# Patient Record
Sex: FEMALE | Race: WHITE | Employment: OTHER | URBAN - METROPOLITAN AREA
[De-identification: names, ages, dates, MRNs, and addresses within clinical notes are randomized per-mention and may not be internally consistent; named-entity substitution may affect disease eponyms.]

---

## 2018-01-17 ENCOUNTER — TRANSFERRED RECORDS (OUTPATIENT)
Dept: HEALTH INFORMATION MANAGEMENT | Facility: CLINIC | Age: 83
End: 2018-01-17

## 2018-03-15 ENCOUNTER — HOSPITAL ENCOUNTER (OUTPATIENT)
Facility: CLINIC | Age: 83
Setting detail: OBSERVATION
Discharge: HOME | End: 2018-03-16
Attending: EMERGENCY MEDICINE | Admitting: INTERNAL MEDICINE
Payer: MEDICARE

## 2018-03-15 DIAGNOSIS — I48.19 PERSISTENT ATRIAL FIBRILLATION (H): Primary | ICD-10-CM

## 2018-03-15 DIAGNOSIS — K92.2 GASTROINTESTINAL HEMORRHAGE, UNSPECIFIED GASTROINTESTINAL HEMORRHAGE TYPE: ICD-10-CM

## 2018-03-15 PROBLEM — R19.7 DIARRHEA: Status: ACTIVE | Noted: 2018-03-15

## 2018-03-15 LAB
BASOPHILS # BLD AUTO: 0 10E9/L (ref 0–0.2)
BASOPHILS NFR BLD AUTO: 0 %
C COLI+JEJUNI+LARI FUSA STL QL NAA+PROBE: ABNORMAL
DIFFERENTIAL METHOD BLD: ABNORMAL
EC STX1 GENE STL QL NAA+PROBE: ABNORMAL
EC STX2 GENE STL QL NAA+PROBE: ABNORMAL
ENTERIC PATHOGEN COMMENT: ABNORMAL
EOSINOPHIL # BLD AUTO: 0 10E9/L (ref 0–0.7)
EOSINOPHIL NFR BLD AUTO: 0 %
ERYTHROCYTE [DISTWIDTH] IN BLOOD BY AUTOMATED COUNT: 14.8 % (ref 10–15)
HCT VFR BLD AUTO: 33.2 % (ref 35–47)
HGB BLD-MCNC: 10.8 G/DL (ref 11.7–15.7)
IMM GRANULOCYTES # BLD: 0 10E9/L (ref 0–0.4)
IMM GRANULOCYTES NFR BLD: 0.7 %
INR PPP: 2.57 (ref 0.86–1.14)
LACTATE BLD-SCNC: 1.5 MMOL/L (ref 0.7–2)
LYMPHOCYTES # BLD AUTO: 0.9 10E9/L (ref 0.8–5.3)
LYMPHOCYTES NFR BLD AUTO: 31.3 %
MCH RBC QN AUTO: 36.6 PG (ref 26.5–33)
MCHC RBC AUTO-ENTMCNC: 32.5 G/DL (ref 31.5–36.5)
MCV RBC AUTO: 113 FL (ref 78–100)
MONOCYTES # BLD AUTO: 0.5 10E9/L (ref 0–1.3)
MONOCYTES NFR BLD AUTO: 16.4 %
NEUTROPHILS # BLD AUTO: 1.4 10E9/L (ref 1.6–8.3)
NEUTROPHILS NFR BLD AUTO: 50.6 %
NOROV GI+II ORF1-ORF2 JNC STL QL NAA+PR: ABNORMAL
NRBC # BLD AUTO: 0 10*3/UL
NRBC BLD AUTO-RTO: 1 /100
PLATELET # BLD AUTO: 80 10E9/L (ref 150–450)
RBC # BLD AUTO: 2.95 10E12/L (ref 3.8–5.2)
RVA NSP5 STL QL NAA+PROBE: ABNORMAL
SALMONELLA SP RPOD STL QL NAA+PROBE: ABNORMAL
SHIGELLA SP+EIEC IPAH STL QL NAA+PROBE: ABNORMAL
V CHOL+PARA RFBL+TRKH+TNAA STL QL NAA+PR: ABNORMAL
WBC # BLD AUTO: 2.8 10E9/L (ref 4–11)
Y ENTERO RECN STL QL NAA+PROBE: ABNORMAL

## 2018-03-15 PROCEDURE — G0378 HOSPITAL OBSERVATION PER HR: HCPCS

## 2018-03-15 PROCEDURE — 83605 ASSAY OF LACTIC ACID: CPT | Performed by: EMERGENCY MEDICINE

## 2018-03-15 PROCEDURE — 99207 ZZC CDG-MDM COMPONENT: MEETS LOW - DOWN CODED: CPT | Performed by: PHYSICIAN ASSISTANT

## 2018-03-15 PROCEDURE — 87506 IADNA-DNA/RNA PROBE TQ 6-11: CPT | Performed by: EMERGENCY MEDICINE

## 2018-03-15 PROCEDURE — 99285 EMERGENCY DEPT VISIT HI MDM: CPT | Mod: 25

## 2018-03-15 PROCEDURE — 85025 COMPLETE CBC W/AUTO DIFF WBC: CPT | Performed by: EMERGENCY MEDICINE

## 2018-03-15 PROCEDURE — 99219 ZZC INITIAL OBSERVATION CARE,LEVL II: CPT | Performed by: PHYSICIAN ASSISTANT

## 2018-03-15 PROCEDURE — 85610 PROTHROMBIN TIME: CPT | Performed by: EMERGENCY MEDICINE

## 2018-03-15 RX ORDER — ONDANSETRON 4 MG/1
4 TABLET, ORALLY DISINTEGRATING ORAL EVERY 6 HOURS PRN
Status: DISCONTINUED | OUTPATIENT
Start: 2018-03-15 | End: 2018-03-16 | Stop reason: HOSPADM

## 2018-03-15 RX ORDER — ACETAMINOPHEN 650 MG/1
650 SUPPOSITORY RECTAL EVERY 4 HOURS PRN
Status: DISCONTINUED | OUTPATIENT
Start: 2018-03-15 | End: 2018-03-16 | Stop reason: HOSPADM

## 2018-03-15 RX ORDER — NALOXONE HYDROCHLORIDE 0.4 MG/ML
.1-.4 INJECTION, SOLUTION INTRAMUSCULAR; INTRAVENOUS; SUBCUTANEOUS
Status: DISCONTINUED | OUTPATIENT
Start: 2018-03-15 | End: 2018-03-16 | Stop reason: HOSPADM

## 2018-03-15 RX ORDER — ONDANSETRON 2 MG/ML
4 INJECTION INTRAMUSCULAR; INTRAVENOUS EVERY 6 HOURS PRN
Status: DISCONTINUED | OUTPATIENT
Start: 2018-03-15 | End: 2018-03-16 | Stop reason: HOSPADM

## 2018-03-15 RX ORDER — ACETAMINOPHEN 325 MG/1
650 TABLET ORAL EVERY 4 HOURS PRN
Status: DISCONTINUED | OUTPATIENT
Start: 2018-03-15 | End: 2018-03-16 | Stop reason: HOSPADM

## 2018-03-15 ASSESSMENT — ENCOUNTER SYMPTOMS
BLOOD IN STOOL: 1
VOMITING: 0
NAUSEA: 0
ABDOMINAL PAIN: 1
DIARRHEA: 1

## 2018-03-15 NOTE — ED NOTES
"..  [facility]  ED Nurse Handoff Report    Kayley Putnam is a 84 year old female   ED Chief complaint: Diarrhea and Rectal Bleeding  . ED Diagnosis:   Final diagnoses:   Gastrointestinal hemorrhage, unspecified gastrointestinal hemorrhage type     Allergies:   Allergies   Allergen Reactions     Amlodipine      Codeine      Flecainide      Meperidine      Promethazine        Code Status: Full Code  Activity level - Baseline/Home:  Independent. Activity Level - Current:   Stand with Assist. Lift room needed: No. Bariatric: No   Needed: No   Isolation: Yes. Infection: Not Applicable  Other .     Vital Signs:   Vitals:    03/15/18 1706 03/15/18 1734 03/15/18 1745 03/15/18 1800   BP: 171/71 166/89     Pulse: 79      Resp: 20      Temp: 97.5  F (36.4  C)      TempSrc: Temporal      SpO2: 99% 100% 99% 97%   Weight: 87.5 kg (193 lb)      Height: 1.651 m (5' 5\")          Cardiac Rhythm:  ,      Pain level: 0-10 Pain Scale: 6  Patient confused: No. Patient Falls Risk: Yes.   Elimination Status: Has voided   Patient Report - Initial Complaint: diarrhea. Focused Assessment: Pt went to  for c/o bloody diarrhea once this morning. Diarrhea since with no blood. They did labs and CT. Sent here for further eval. Pt on warfarin for afib. AOx4. ABcs intact. Lives independently.    Tests Performed: labs . Abnormal Results: ..  Labs Ordered and Resulted from Time of ED Arrival Up to the Time of Departure from the ED   CBC WITH PLATELETS DIFFERENTIAL - Abnormal; Notable for the following:        Result Value    WBC 2.8 (*)     RBC Count 2.95 (*)     Hemoglobin 10.8 (*)     Hematocrit 33.2 (*)      (*)     MCH 36.6 (*)     Platelet Count 80 (*)     Nucleated RBCs 1 (*)     Absolute Neutrophil 1.4 (*)     All other components within normal limits   INR - Abnormal; Notable for the following:     INR 2.57 (*)     All other components within normal limits   LACTIC ACID WHOLE BLOOD   ENTERIC BACTERIA AND VIRUS " PANEL BY NATHALIE ESTEBAN     .   Treatments provided: NA  Family Comments: NA  OBS brochure/video discussed/provided to patient:  Yes  ED Medications: Medications - No data to display  Drips infusing:  No  For the majority of the shift, the patient's behavior Green. Interventions performed were NA.     Severe Sepsis OR Septic Shock Diagnosis Present: No      ED Nurse Name/Phone Number: Magdalene LOW Fermin,   6:45 PM    RECEIVING UNIT ED HANDOFF REVIEW    Above ED Nurse Handoff Report was reviewed: Yes  Reviewed by: Dorinda Silva on March 15, 2018 at 7:15 PM

## 2018-03-15 NOTE — ED PROVIDER NOTES
History     Chief Complaint:  Diarrhea and Rectal Bleeding      HPI   Kayley Putnam is an anticoagulated 84 year old female who presents with diarrhea and rectal bleeding. The patient states last night she had onset of abdominal pain and diarrhea 45 minutes after eating a [employer] salad. She took Pepto Bismol which relieved her symptoms. Then this morning her abdominal pain and cramping returned. She states that she sat on the toilet and passed gas and a bunch of small bright red blood clots. She then went to [facility] where blood work and CT was performed showing colitis. While there she had one episode of diarrhea which was without blood and she has not had any bowel movements since that time. Upon arrival here she states that she has only had one episode of diarrhea today though has had persistent lower abdominal pain and cramping. Patient denies nausea, vomiting, and all other complaints.    3/15 [facility] CT Abdomen Pelvis w/ Contrast:   IMPRESSION:  Segment of mildly asymmetric, colonic wall thickening in the descending colon. There is no underlying diverticulosis here and differential diagnosis includes colitis or, less likely but not excluded, lymphoma.     Allergies:  Amlodipine  Codeine  Flecainide  Meperidine  Promethazine      Medications:    Calcium D  Hydrodiuril  Lipitor  Lisinopril  Metoprolol   Coumadin     Past Medical History:    Atrial Fibrillation  Atherosclerosis of Aorta  CAD  Hypertension  Hyperlipidemia  Myelodysplastic Syndrome  Overweight and Obesity    Past Surgical History:    History reviewed. No pertinent surgical history.     Family History:    Cerebrovascular     Social History:  Presents alone   Tobacco use: Never  Alcohol use: Occasional wine  PCP: Taj Gordillo    Marital Status:       Review of Systems   Gastrointestinal: Positive for abdominal pain, blood in stool and diarrhea. Negative for nausea and vomiting.   All other systems reviewed and are  "negative.    Physical Exam     Patient Vitals for the past 24 hrs:   BP Temp Temp src Pulse Resp SpO2 Height Weight   03/15/18 1800 - - - - - 97 % - -   03/15/18 1745 - - - - - 99 % - -   03/15/18 1734 166/89 - - - - 100 % - -   03/15/18 1706 171/71 97.5  F (36.4  C) Temporal 79 20 99 % 1.651 m (5' 5\") 87.5 kg (193 lb)      Physical Exam  General: Patient is alert and interactive when I enter the room  Head:  The scalp, face, and head appear normal  Eyes:  Conjunctivae are normal  ENT:    The nose is normal    Pinnae are normal    External acoustic canals are normal  Neck:  Trachea midline  CV:  Pulses are normal  Resp:  No respiratory distress  Abdomen:      Soft, tender in LLQ and RLQ, non-distended  Musc:  Normal muscular tone    No major joint effusions  Skin:  No rash or lesions noted  Neuro:  Speech is normal and fluent. Face is symmetric.     Moving all extremities well.   Psych: Awake. Alert.  Normal affect.  Appropriate interactions.    Emergency Department Course   Laboratory:  CBC: WBC 2.8 (L), HGB 10.8 (L), PLT 80 (L)   INR: 2.57 (H)  Lactic: 1.5    Enteric Bacteria and Virus Panel by NATHALIE Stool: Pending    Emergency Department Course:  Past medical records, nursing notes, and vitals reviewed.  1720: I performed an exam of the patient and obtained history, as documented above.  IV inserted and blood drawn.   I personally reviewed the laboratory results with the Patient and answered all related questions prior to admission.  Findings and plan explained to the Patient who consents to admission.   1843: Discussed the patient with HU Arvizu, who will admit the patient for Dr. Can to an observation bed for further monitoring, evaluation, and treatment.        Impression & Plan    Medical Decision Making:  Kayley Putnam is a 84 year old female with a history of atrial fibrillation and MDS who presents with diarrhea and rectal bleeding. Vitals were unremarkable. Exam showed mild lower abdominal " tenderness but otherwise she was well appearing. Patient had a CT which showed colitis, asymmetrical, but no other findings. Her hemoglobin is 10.8, which seems to be her baseline. She is on Coumadin, INR was 2.7 which is therapeutic. Her platelets are low at 80, but she reports that her platelets are always low secondary to her MDS. I talked to the patient about going home versus coming in and she does live alone and would prefer to come in. She has not had any bleeding since late this morning and no hematemesis so it would be reasonable for her to go home, but given she lives alone and is still having the cramping the patient would prefer to come in. I feel that this is reasonable. Patient will be admitted to the observation unit for contained observation and serial hemoglobins. Patient admitted.      Diagnosis:    ICD-10-CM    1. Gastrointestinal hemorrhage, unspecified gastrointestinal hemorrhage type K92.2        Disposition:  Admitted to an observation bed.       3/15/2018   [facility] EMERGENCY DEPARTMENT  Tereas BUSCH, ruben serving as a scribe at 5:20 PM on 3/15/2018 to document services personally performed by Indira Singh MD based on my observations and the provider's statements to me.         Indira Singh MD  03/15/18 0377

## 2018-03-15 NOTE — IP AVS SNAPSHOT
MRN:[id number]                      After Visit Summary   3/15/2018    Kayley Putnam    MRN: [id number]           Thank you!     Thank you for choosing [facility] for your care. Our goal is always to provide you with excellent care. Hearing back from our patients is one way we can continue to improve our services. Please take a few minutes to complete the written survey that you may receive in the mail after you visit. If you would like to speak to someone directly about your visit please contact Patient Relations at [phone]. Thank you!          Patient Information     Date Of Birth          7/9/1933        About your hospital stay     You were admitted on:  March 15, 2018 You last received care in the:  [facility] Observation Department    You were discharged on:  March 16, 2018        Reason for your hospital stay       You were admitted for acute colitis.  You had a CT scan that confirmed this prior to your admission.  Most likely this is due to a self-limited gastroenteritis as you are improving with supportive care alone.  Your stool sample did not reveal any infectious process.  You should slowly advance your diet over the next few days and return if you have any further rectal bleeding, high fevers or worsening abdominal pain.                  Who to Call     For medical emergencies, please call 911.  For non-urgent questions about your medical care, please call your primary care provider or clinic, [phone]          Attending Provider     Provider Specialty    Indira Singh MD Emergency Medicine    Winston Can MD Internal Medicine    Malena Pollard DO Internal Medicine       Primary Care Provider Office Phone # Fax #    Taj Gordillo [phone]      After Care Instructions     Activity       Your activity upon discharge: as tolerated            Diet       Follow this diet upon discharge: resume home cardiac diet                   Follow-up Appointments     Follow-up and recommended labs and tests        INR, BMP on am of 3/19/18 with results to PCP.  Hold your home HCTZ until 3/19/18 and then can restart if your stooling is less  F/U with PCP within 2wks for hospital f/u   Colonoscopy in 6 wks                  Additional Services     Home care nursing referral       RN skilled nursing visit. RN to assess vital signs and weight and respiratory and cardiac status.    Your provider has ordered home care nursing services. If you have not been contacted within 2 days of your discharge please call the inpatient department phone number at [phone] .                             Warfarin Instruction     You have started taking a medicine called warfarin. This is a blood-thinning medicine (anticoagulant). It helps prevent and treat blood clots.      Before leaving the hospital, make sure you know how much to take and how long to take it.      You will need regular blood tests to make sure your blood is clotting safely. It is very important to see your doctor for regular blood tests.    Talk to your doctor before taking any new medicine (this includes over-the-counter drugs and herbal products). Many medicines can interact with warfarin. This may cause more bleeding or too much clotting.     Eating a lot of vitamin K--found in green, leafy vegetables--can change the way warfarin works in your body. Do NOT avoid these foods. Instead, try to eat the same amount each day.     Bleeding is the most common side-effect of warfarin. You may notice bleeding gums, a bloody nose, bruises and bleeding longer when you cut yourself. See a doctor at once if:   o You cough up blood  o You find blood in your stool (poop)  o You have a deep cut, or a cut that bleeds longer than 10 minutes   o You have a bad cut, hard fall, accident or hit your head (go to urgent care or the emergency room).    For women who can get pregnant: This medicine can harm  "an unborn baby. Be very careful not to get pregnant while taking this medicine. If you think you might be pregnant, call your doctor right away.    For more information, read \"Guide to Warfarin Therapy,  the booklet you received in the hospital.        Pending Results     Date and Time Order Name Status Description    3/16/2018 0958 Clostridium difficile toxin B PCR In process             Statement of Approval     Ordered          18 9808  I have reviewed and agree with all the recommendations and orders detailed in this document.  EFFECTIVE NOW     Approved and electronically signed by:  Malena Pollard DO           18 7526  I have reviewed and agree with all the recommendations and orders detailed in this document.  EFFECTIVE NOW     Approved and electronically signed by:  Malena Pollard DO             Admission Information     Date & Time Provider Department Dept. Phone    3/15/2018 Malena Pollard,  [facility] Observation Department [phone]      Your Vitals Were     Blood Pressure Pulse Temperature Respirations Height Weight    139/44 (BP Location: Right arm) 61 100  F (37.8  C) (Oral) 16 1.651 m (5' 5\") 88 kg (194 lb)    Pulse Oximetry BMI (Body Mass Index)                95% 32.28 kg/m2          MyChart Information     [employer] lets you send messages to your doctor, view your test results, renew your prescriptions, schedule appointments and more. To sign up, go to [email or web address] . Click on \"Log in\" on the left side of the screen, which will take you to the Welcome page. Then click on \"Sign up Now\" on the right side of the page.     You will be asked to enter the access code listed below, as well as some personal information. Please follow the directions to create your username and password.     Your access code is: [email or web address]  Expires: 2018  6:38 PM     Your access code will  in 90 days. If you need help or a new code, please " call your [place] clinic or [phone].        Care EveryWhere ID     This is your Care EveryWhere ID. This could be used by other organizations to access your [place] medical records  [id number]        Equal Access to Services     CASEY ABDUL : Hadii aad ku hadgwen Byrnes, waaxda luqadaha, qaybta kaalmada adejer, isidra duronkindra zelayastella borges anthony ferrara. So [facility] [phone].    ATENCIÓN: Si habla español, tiene a bear disposición servicios gratuitos de asistencia lingüística. [place] [phone].    We comply with applicable federal civil rights laws and [place] laws. We do not discriminate on the basis of race, color, national origin, age, disability, sex, sexual orientation, or gender identity.               Review of your medicines      CONTINUE these medicines which may have CHANGED, or have new prescriptions. If we are uncertain of the size of tablets/capsules you have at home, strength may be listed as something that might have changed.        Dose / Directions    warfarin 4 MG tablet   Commonly known as:  COUMADIN   This may have changed:    - how much to take  - when to take this   Used for:  Persistent atrial fibrillation (H)        Dose:  2 mg   Start taking on:  3/17/2018   Take 0.5 tablets (2 mg) by mouth daily 4mg=Tue and Fri, 6mg=ROW   Quantity:  30 tablet   Refills:  0         CONTINUE these medicines which have NOT CHANGED        Dose / Directions    amoxicillin 500 MG capsule   Commonly known as:  AMOXIL        Dose:  500 mg   Take 500 mg by mouth 4 tablets prior to dental appointments   Refills:  2       ARANESP (ALBUMIN FREE) 300 MCG/ML Soln   Generic drug:  darbepoetin john-polysorbate        Inject Subcutaneous every 14 days Inject every 2 weeks   Refills:  0       calcium 600 + D 600-400 MG-UNIT per tablet   Generic drug:  calcium-vitamin D        Dose:  1 tablet   Take 1 tablet by mouth 2 times daily   Refills:  0       LIPITOR 20 MG tablet   Generic drug:  atorvastatin         Dose:  20 mg   Take 20 mg by mouth At Bedtime   Refills:  0       lisinopril 10 MG tablet   Commonly known as:  PRINIVIL/ZESTRIL        Dose:  10 mg   Take 10 mg by mouth daily   Refills:  0       metoprolol succinate 25 MG 24 hr tablet   Commonly known as:  TOPROL-XL        Dose:  25 mg   Take 25 mg by mouth 2 times daily   Refills:  0       TYLENOL EXTRA STRENGTH PO        Dose:  500 mg   Take 500 mg by mouth At Bedtime   Refills:  0       Vitamin C 500 MG Caps        Dose:  500 mg   Take 500 mg by mouth daily   Refills:  0         STOP taking     hydrochlorothiazide 25 MG tablet   Commonly known as:  HYDRODIURIL                Where to get your medicines      Some of these will need a paper prescription and others can be bought over the counter. Ask your nurse if you have questions.     You don't need a prescription for these medications     warfarin 4 MG tablet                Protect others around you: Learn how to safely use, store and throw away your medicines at www.disposemymeds.org.             Medication List: This is a list of all your medications and when to take them. Check marks below indicate your daily home schedule. Keep this list as a reference.      Medications           Morning Afternoon Evening Bedtime As Needed    amoxicillin 500 MG capsule   Commonly known as:  AMOXIL   Take 500 mg by mouth 4 tablets prior to dental appointments                                ARANESP (ALBUMIN FREE) 300 MCG/ML Soln   Inject Subcutaneous every 14 days Inject every 2 weeks   Generic drug:  darbepoetin john-polysorbate                                calcium 600 + D 600-400 MG-UNIT per tablet   Take 1 tablet by mouth 2 times daily   Generic drug:  calcium-vitamin D                                LIPITOR 20 MG tablet   Take 20 mg by mouth At Bedtime   Generic drug:  atorvastatin                                lisinopril 10 MG tablet   Commonly known as:  PRINIVIL/ZESTRIL   Take 10 mg by mouth daily                   "              metoprolol succinate 25 MG 24 hr tablet   Commonly known as:  TOPROL-XL   Take 25 mg by mouth 2 times daily                                TYLENOL EXTRA STRENGTH PO   Take 500 mg by mouth At Bedtime                                Vitamin C 500 MG Caps   Take 500 mg by mouth daily                                warfarin 4 MG tablet   Commonly known as:  COUMADIN   Take 0.5 tablets (2 mg) by mouth daily 4mg=Tue and Fri, 6mg=ROW   Start taking on:  3/17/2018                                          More Information        Yell Diet  Your healthcare provider may recommend a bland diet if you have an upset stomach. It consists of foods that are mild and easy to digest. It is better to eat small frequent meals rather than 3 large meals a day.    Beverages  OK: Fruit juices, non-caffeinated teas and coffee, non-carbonated isaacs  Avoid: Carbonated beverage, caffeinated tea and coffee, all alcoholic beverages  Bread  OK: Refined white, wheat or rye bread, shawna or soda crackers, [place] toast, plain rolls, bagels  Avoid: Whole-grain bread  Cereal  OK: Refined cereals: cooked or ready to eat  Avoid: Whole-grain cereals and granola, or those containing bran, seeds or nuts  Desserts  OK: Peanut butter and all others except those to \"avoid\"  Avoid: Chocolate, cocoa, coconut, popcorn, nuts, seeds, jam, marmalade  Fruits  OK: Canned, cooked, frozen or fresh fruits without seeds or tough skin  Avoid: Olives, skin and seeds of fruit  Meats  OK: All fresh or preserved meat, fish and fowl  Avoid: Any that are prepared with those spices to \"avoid\"  Cheese and eggs  OK: Eggs, cottage cheese, cream cheese, other cheeses  Avoid: All cheeses made with those spices to \"avoid\"  Potatoes and pasta  OK: Potato, rice, macaroni, noodles, spaghetti  Avoid: None  Soups  OK: All soups without heavy seasoning  Avoid: Soups made with those spices to \"avoid\"  Vegetables  OK: Canned, cooked, fresh or frozen mildly flavored vegetables " "without seeds, skins or coarse fiber  Avoid: Vegetables prepared with those spices to \"avoid\"; skin and seeds of vegetables and those with coarse fiber  Spices  OK: Salt, lemon and lime juice, vinegar, all extracts, madhav, cinnamon, thyme, mace, allspice, paprika  Avoid: Chili powder, cloves, pepper, seed spices, garlic, gravy pickles, highly seasoned salad dressings  Date Last Reviewed: 11/20/2015 2000-2017 [employer]. [address] [place] [address]. All rights reserved. This information is not intended as a substitute for professional medical care. Always follow your healthcare professional's instructions.             "

## 2018-03-15 NOTE — IP AVS SNAPSHOT
[facility] Observation Department    [address]    [facility] [address]    Phone:  [phone]                                       After Visit Summary   3/15/2018    Kayley Putnam    MRN: [id number]           After Visit Summary Signature Page     I have received my discharge instructions, and my questions have been answered. I have discussed any challenges I see with this plan with the nurse or doctor.    ..........................................................................................................................................  Patient/Patient Representative Signature      ..........................................................................................................................................  Patient Representative Print Name and Relationship to Patient    ..................................................               ................................................  Date                                            Time    ..........................................................................................................................................  Reviewed by Signature/Title    ...................................................              ..............................................  Date                                                            Time

## 2018-03-15 NOTE — ED NOTES
Patient states she had onset of abdominal pain and diarrhea last night after eating a salad.  Took pepto bismol which relieved the pain.  Today the pain and diarrhea returned along with bright red rectal bleeding.      Patient seen at  as she is on Coumadin.  Sent here for probable admit.  INR 2.6, Hgb 10, Plt 80.      ABCs intact.  Alert and oriented x 3.

## 2018-03-16 VITALS
TEMPERATURE: 100 F | SYSTOLIC BLOOD PRESSURE: 139 MMHG | DIASTOLIC BLOOD PRESSURE: 44 MMHG | BODY MASS INDEX: 32.32 KG/M2 | WEIGHT: 194 LBS | HEART RATE: 61 BPM | HEIGHT: 65 IN | OXYGEN SATURATION: 95 % | RESPIRATION RATE: 16 BRPM

## 2018-03-16 LAB
ANION GAP SERPL CALCULATED.3IONS-SCNC: 8 MMOL/L (ref 3–14)
BASOPHILS # BLD AUTO: 0 10E9/L (ref 0–0.2)
BASOPHILS NFR BLD AUTO: 0 %
BUN SERPL-MCNC: 18 MG/DL (ref 7–30)
C COLI+JEJUNI+LARI FUSA STL QL NAA+PROBE: NOT DETECTED
C DIFF TOX B STL QL: ABNORMAL
C DIFF TOX B STL QL: NEGATIVE
CALCIUM SERPL-MCNC: 8.7 MG/DL (ref 8.5–10.1)
CHLORIDE SERPL-SCNC: 98 MMOL/L (ref 94–109)
CO2 SERPL-SCNC: 27 MMOL/L (ref 20–32)
CREAT SERPL-MCNC: 0.82 MG/DL (ref 0.52–1.04)
DIFFERENTIAL METHOD BLD: ABNORMAL
EC STX1 GENE STL QL NAA+PROBE: NOT DETECTED
EC STX2 GENE STL QL NAA+PROBE: NOT DETECTED
ENTERIC PATHOGEN COMMENT: NORMAL
EOSINOPHIL # BLD AUTO: 0 10E9/L (ref 0–0.7)
EOSINOPHIL NFR BLD AUTO: 0 %
ERYTHROCYTE [DISTWIDTH] IN BLOOD BY AUTOMATED COUNT: 14.8 % (ref 10–15)
GFR SERPL CREATININE-BSD FRML MDRD: 66 ML/MIN/1.7M2
GLUCOSE SERPL-MCNC: 90 MG/DL (ref 70–99)
HCT VFR BLD AUTO: 28.1 % (ref 35–47)
HGB BLD-MCNC: 9.3 G/DL (ref 11.7–15.7)
HGB BLD-MCNC: 9.4 G/DL (ref 11.7–15.7)
IMM GRANULOCYTES # BLD: 0 10E9/L (ref 0–0.4)
IMM GRANULOCYTES NFR BLD: 0.5 %
INR PPP: 2.6 (ref 0.86–1.14)
LYMPHOCYTES # BLD AUTO: 0.9 10E9/L (ref 0.8–5.3)
LYMPHOCYTES NFR BLD AUTO: 45.6 %
MCH RBC QN AUTO: 36.9 PG (ref 26.5–33)
MCHC RBC AUTO-ENTMCNC: 33.1 G/DL (ref 31.5–36.5)
MCV RBC AUTO: 112 FL (ref 78–100)
MONOCYTES # BLD AUTO: 0.3 10E9/L (ref 0–1.3)
MONOCYTES NFR BLD AUTO: 16.4 %
NEUTROPHILS # BLD AUTO: 0.7 10E9/L (ref 1.6–8.3)
NEUTROPHILS NFR BLD AUTO: 37.5 %
NOROV GI+II ORF1-ORF2 JNC STL QL NAA+PR: NOT DETECTED
NRBC # BLD AUTO: 0 10*3/UL
NRBC BLD AUTO-RTO: 1 /100
PLATELET # BLD AUTO: 58 10E9/L (ref 150–450)
POTASSIUM SERPL-SCNC: 3.8 MMOL/L (ref 3.4–5.3)
RBC # BLD AUTO: 2.52 10E12/L (ref 3.8–5.2)
RVA NSP5 STL QL NAA+PROBE: NOT DETECTED
SALMONELLA SP RPOD STL QL NAA+PROBE: NOT DETECTED
SHIGELLA SP+EIEC IPAH STL QL NAA+PROBE: NOT DETECTED
SODIUM SERPL-SCNC: 133 MMOL/L (ref 133–144)
SPECIMEN SOURCE: ABNORMAL
SPECIMEN SOURCE: NORMAL
V CHOL+PARA RFBL+TRKH+TNAA STL QL NAA+PR: NOT DETECTED
WBC # BLD AUTO: 2 10E9/L (ref 4–11)
Y ENTERO RECN STL QL NAA+PROBE: NOT DETECTED

## 2018-03-16 PROCEDURE — 85018 HEMOGLOBIN: CPT | Mod: 91 | Performed by: INTERNAL MEDICINE

## 2018-03-16 PROCEDURE — G0378 HOSPITAL OBSERVATION PER HR: HCPCS

## 2018-03-16 PROCEDURE — 85025 COMPLETE CBC W/AUTO DIFF WBC: CPT | Performed by: PHYSICIAN ASSISTANT

## 2018-03-16 PROCEDURE — 80048 BASIC METABOLIC PNL TOTAL CA: CPT | Performed by: PHYSICIAN ASSISTANT

## 2018-03-16 PROCEDURE — 99217 ZZC OBSERVATION CARE DISCHARGE: CPT | Performed by: INTERNAL MEDICINE

## 2018-03-16 PROCEDURE — 87493 C DIFF AMPLIFIED PROBE: CPT | Performed by: INTERNAL MEDICINE

## 2018-03-16 PROCEDURE — 36415 COLL VENOUS BLD VENIPUNCTURE: CPT | Performed by: PHYSICIAN ASSISTANT

## 2018-03-16 PROCEDURE — 36415 COLL VENOUS BLD VENIPUNCTURE: CPT | Performed by: INTERNAL MEDICINE

## 2018-03-16 PROCEDURE — 85610 PROTHROMBIN TIME: CPT | Performed by: PHYSICIAN ASSISTANT

## 2018-03-16 RX ORDER — WARFARIN SODIUM 4 MG/1
2 TABLET ORAL DAILY
Qty: 30 TABLET | Refills: 0 | Status: ON HOLD
Start: 2018-03-17 | End: 2020-02-21

## 2018-03-16 RX ORDER — LISINOPRIL 10 MG/1
10 TABLET ORAL DAILY
Status: DISCONTINUED | OUTPATIENT
Start: 2018-03-16 | End: 2018-03-16

## 2018-03-16 RX ORDER — METOPROLOL SUCCINATE 25 MG/1
25 TABLET, EXTENDED RELEASE ORAL 2 TIMES DAILY
Status: DISCONTINUED | OUTPATIENT
Start: 2018-03-16 | End: 2018-03-16 | Stop reason: HOSPADM

## 2018-03-16 RX ORDER — LISINOPRIL 10 MG/1
10 TABLET ORAL DAILY
Status: DISCONTINUED | OUTPATIENT
Start: 2018-03-16 | End: 2018-03-16 | Stop reason: HOSPADM

## 2018-03-16 NOTE — PROGRESS NOTES
"Pt seen and examined.  Feels \"about 30% better\".  Stooling is every few hours, but less in amount.  Tolerating some diet.  No further bloody stools.  + fever this am.  Awaiting C diff stool and repeat hgb now.    "

## 2018-03-16 NOTE — PLAN OF CARE
Problem: Patient Care Overview  Goal: Discharge Needs Assessment  Outcome: No Change  PRIMARY DIAGNOSIS: GASTROENTERITIS    OUTPATIENT/OBSERVATION GOALS TO BE MET BEFORE DISCHARGE  1. Orthostatic performed: No    2. Tolerating PO fluid and/or antibiotics (if applicable): Yes    3. Nausea/Vomiting/Diarrhea symptoms improved: No,  a few loose stools    4. Pain status: Improved with alternative comfort measures: heating pad    5. Return to near baseline physical activity: Yes    Discharge Planner Nurse   Safe discharge environment identified: Yes  Barriers to discharge: Yes, awaiting results of stool specimen       Entered by: Kell Dalal 03/16/2018 2:33 AM     Please review provider order for any additional goals.   Nurse to notify provider when observation goals have been met and patient is ready for discharge.    VSS on RA. Pt continues to c/o cramping, using heating pad with improvement. Denies n/v. Ambulating in room ad mazin, steady gait. PIV SL, tolerating diet. Awaiting results of stool sample. Alert and oriented, able to make needs known. Continue to monitor.

## 2018-03-16 NOTE — DISCHARGE SUMMARY
"[facility]    Discharge Summary  Hospitalist    Date of Admission:  3/15/2018  Date of Discharge:  3/16/2018  3:12 PM  Provider:  Malena Pollard DO    Discharge Diagnoses   1.  BRBPR, resolved  2.  Acute colitis, improving  3.  Chronic anticoagulation with coumadin  4.  Pancytopenia d/t MDS  Other medical issues:  Past Medical History:   Diagnosis Date     AF (atrial fibrillation) (H)      Atherosclerosis of aorta (H)      CAD (coronary artery disease)      HTN (hypertension)      Hyperlipidemia      Myelodysplastic syndrome (H)      Overweight and obesity(278.0)        History of Present Illness   Kayley Putnam is an 84 year old female who presented from urgent care with abd pain and BRBPR.  Please see the admission history and physical for full details.    Hospital Course   Kayley Putnam was admitted on 3/15/2018.  The following problems were addressed during her hospitalization:    1.  Acute colitis and BRBPR  Ms. Putnam is a 85 yo female who presented from urgent care after experiencing the acute symptoms of BRBPR, loose stools and abd cramping.  She was initially seen in urgent care and CT scan there demonstrated asymmetric colitis of the descending colon.  She is on chronic anti-coagulation, which was held on admission.  She had several small bloody stools after admission but none for several hours prior to her d/c.  Initial loose stool was just sent for enteric pathogens; c diff unable to obtain as no further loose stooling after that episode.      Her hgb was stable and there was no need for blood transfusion.  She has mild leukopenia and low-grade temps during her hospital stay. She was feeling quite a bit better on am and pm of day of d/c. She was d/c from the hospital on a low fiber diet with close f/u with PCP.    The CT scan report stated that lymphoma \"could not be ruled out\".  Likely colitis, but will need close f/u with PCP after symptoms resolve to discuss f/u colonoscopy or " repeat CT imaging to ensure resolution.    2.  Chronic anti-coagulation   On warfarin (last dose was even of 3/14/18).  Coumadin was held on admission and INR was 2.6 from 2.57 (admission).  She was instructed to hold her dose on evening of d/c and then take 2mg po qpm x 2 days.  INR recheck on 3/19/18 for further coumadin dosing.    3.  Dehydration in setting of fevers  Her home diuretics for HTN were held on admission and IVF given.  She was instructed to hold her HCTZ for an additional day and recheck in po intake ok on 3/19/19.  BMP with INR on 3/19/18 ordered with results to PCP.    Significant Results and Procedures   none    Pending Results   Unresulted Labs Ordered in the Past 30 Days of this Admission     Date and Time Order Name Status Description    3/16/2018 0958 Clostridium difficile toxin B PCR In process           Code Status   Full Code       Primary Care Physician   Taj Grodillo    Physical Exam   Temp: 100  F (37.8  C) Temp src: Oral BP: 139/44 Pulse: 61   Resp: 16 SpO2: 95 % O2 Device: None (Room air)    Vitals:    03/15/18 1706 03/15/18 1928   Weight: 87.5 kg (193 lb) 88 kg (194 lb)     Vital Signs with Ranges  Temp:  [96.9  F (36.1  C)-100.6  F (38.1  C)] 100  F (37.8  C)  Pulse:  [61-79] 61  Resp:  [16-20] 16  BP: (117-171)/(42-89) 139/44  SpO2:  [93 %-100 %] 95 %     PT SEEN AND EXAMINED ON DAY OF D/C  GEN:  Alert, oriented x 3, comfortable, pale appearing, but in no acute respiratory distress  HEENT:  Normocephalic/atraumatic, PERRL, no scleral icterus, no nasal discharge, mouth and membranes moist, no oral ulcers or thrush noted.  NECK:  No clear thyromegaly of clear JVD  CV:  Somewhat distant but regular rate and rhythm, no loud murmur to ausc.  S1 + S2 noted, no S3 or S4.  LUNGS:  Clear to auscultation ant/lat bilaterally.  No rales/rhonchi/wheezing auscultated bilaterally.  No costal retractions bilaterally.  Symmetric chest rise on inhalation noted.  ABD:  Active bowel sounds, soft,  mild right to mid epigastric tenderness noted, mild distension throughout.  No clear rebound/guarding/rigidity.  No masses palpated.  No obvious HSM to exam.  EXT:  No edema or cyanosis bilaterally. No joint synovitis noted.  No calf-tenderness or asymmetry noted.  SKIN:  Dry to touch, no rashes or jaundice noted.  PSYCH:  Mood somewhat flattened, Not tearful or depressed.  Maintains direct eye contact.  NEURO:  No tremors at rest, speech clear and appropriate, following all commands without any clear difficulty or deficits.    Discharge Disposition   Discharged to assisted living    Consultations This Hospital Stay   None    Time Spent on this Encounter   I, Malena Pollard, personally saw the patient today and spent greater than 30 minutes discharging this patient.    Discharge Orders     Home care nursing referral     Follow-up and recommended labs and tests    INR, BMP on am of 3/19/18 with results to PCP.  Hold your home HCTZ until 3/19/18 and then can restart if your stooling is less  F/U with PCP within 2wks for hospital f/u   Colonoscopy in 6 wks     Activity   Your activity upon discharge: as tolerated     MD face to face encounter   Documentation of Face to Face and Certification for Home Health Services    I certify that patient: Kayley Putnam is under my care and that I, or a nurse practitioner or physician's assistant working with me, had a face-to-face encounter that meets the physician face-to-face encounter requirements with this patient on: 3/16/2018.    This encounter with the patient was in whole, or in part, for the following medical condition, which is the primary reason for home health care: recent hospitalization for diarrheal illness and weakness.    I certify that, based on my findings, the following services are medically necessary home health services: Nursing.    My clinical findings support the need for the above services because: Nurse is needed: To assess BP after changes in  medications or other medical regimen..    Further, I certify that my clinical findings support that this patient is homebound (i.e. absences from home require considerable and taxing effort and are for medical reasons or [employer] services or infrequently or of short duration when for other reasons) because: Requires assistance of another person or specialized equipment to access medical services because patient: Requires supervision of another for safe transfer...    Based on the above findings. I certify that this patient is confined to the home and needs intermittent skilled nursing care, physical therapy and/or speech therapy.  The patient is under my care, and I have initiated the establishment of the plan of care.  This patient will be followed by a physician who will periodically review the plan of care.  Physician/Provider to provide follow up care: Taj Gordillo    Attending hospital physician (the Medicare certified [place] provider): Malena Pollard  Physician Signature: See electronic signature associated with these discharge orders.  Date: 3/16/2018     Reason for your hospital stay   You were admitted for acute colitis.  You had a CT scan that confirmed this prior to your admission.  Most likely this is due to a self-limited gastroenteritis as you are improving with supportive care alone.  Your stool sample did not reveal any infectious process.  You should slowly advance your diet over the next few days and return if you have any further rectal bleeding, high fevers or worsening abdominal pain.     Full Code     Diet   Follow this diet upon discharge: resume home cardiac diet       Discharge Medications   Current Discharge Medication List      CONTINUE these medications which have CHANGED    Details   warfarin (COUMADIN) 4 MG tablet Take 0.5 tablets (2 mg) by mouth daily 4mg=Tue and Fri, 6mg=ROW  Qty: 30 tablet, Refills: 0    Comments: No dose tonight (3/16/18) then 2mg on even of 3/17/18  and 3/18/18 and then as directed by PCP after INR is drawn on am on 3/19/18  Associated Diagnoses: Persistent atrial fibrillation (H)         CONTINUE these medications which have NOT CHANGED    Details   amoxicillin (AMOXIL) 500 MG capsule Take 500 mg by mouth 4 tablets prior to dental appointments  Refills: 2      darbepoetin john-polysorbate (ARANESP, ALBUMIN FREE,) 300 MCG/ML SOLN Inject Subcutaneous every 14 days Inject every 2 weeks       calcium-vitamin D (CALCIUM 600 + D) 600-400 MG-UNIT per tablet Take 1 tablet by mouth 2 times daily      atorvastatin (LIPITOR) 20 MG tablet Take 20 mg by mouth At Bedtime       lisinopril (PRINIVIL,ZESTRIL) 10 MG tablet Take 10 mg by mouth daily      metoprolol (TOPROL-XL) 25 MG 24 hr tablet Take 25 mg by mouth 2 times daily      Acetaminophen (TYLENOL EXTRA STRENGTH PO) Take 500 mg by mouth At Bedtime       Ascorbic Acid (VITAMIN C) 500 MG CAPS Take 500 mg by mouth daily          STOP taking these medications       hydrochlorothiazide (HYDRODIURIL) 25 MG tablet Comments:   Reason for Stopping:             Allergies   Allergies   Allergen Reactions     Amlodipine      Codeine      Flecainide      Meperidine      Promethazine      Data     Recent Labs  Lab 03/16/18  1141 03/16/18  0702 03/15/18  1734   WBC  --  2.0* 2.8*   HGB 9.4* 9.3* 10.8*   HCT  --  28.1* 33.2*   MCV  --  112* 113*   PLT  --  58* 80*       Recent Labs  Lab 03/16/18  0702      POTASSIUM 3.8   CHLORIDE 98   CO2 27   ANIONGAP 8   GLC 90   BUN 18   CR 0.82   GFRESTIMATED 66   GFRESTBLACK 80   NATALIA 8.7     No results for input(s): CULT in the last 168 hours.    Recent Labs  Lab 03/16/18  0702      POTASSIUM 3.8   CHLORIDE 98   CO2 27   ANIONGAP 8   GLC 90   BUN 18   CR 0.82   GFRESTIMATED 66   GFRESTBLACK 80   NATALIA 8.7     No results for input(s): AST, ALT, GGT, ALKPHOS, BILITOTAL, BILICONJ, BILIDIRECT, ADEOLA in the last 168 hours.    Invalid input(s): BILIRUBININDIRECT    Recent Labs  Lab  03/16/18  0702 03/15/18  1734   INR 2.60* 2.57*     Results for orders placed or performed in visit on 06/20/13   NM Bone Scan 3 Phase    Narrative       NUCLEAR MEDICINE THREE-PHASE BONE SCAN OF BOTH KNEES  6/20/2013 12:51  PM     HISTORY: Right knee pain. Possible stress fracture. The patient has  bilateral knee arthroplasties, the right in 2003 and the left in  2009.     COMPARISON:  Prior bone scan: None.  Other relevant study: Radiographs of the left knee on 4/7/2009.  Radiographs of the right knee in 2003 are not currently available for  comparison.     TECHNIQUE: Following the uneventful intravenous administration of  25.8 mCi of technetium 99m HDP, routine three-phase imaging was  performed of both knees.     FINDINGS: Blood flow imaging demonstrates normal symmetric flow to  both knees with no evidence of hyperemia. Blood pool imaging  demonstrates photopenic defects from bilateral knee arthroplasties.  Delayed imaging demonstrates normal radiotracer uptake surrounding  the left knee prosthesis. However, there is mild increased uptake  adjacent to both the distal femoral and proximal tibial components of  the right knee arthroplasty. This is predominantly adjacent to the  distal tibial stem component. No other abnormal osseous uptake is  seen. Normal soft tissue distribution is noted.       Impression    IMPRESSION: Surgical changes of bilateral knee arthroplasties. There  is mild increased radiotracer uptake adjacent to both the femoral and  tibial components of the right knee arthroplasty. This is most  prominent adjacent to the distal tip of the tibial component. This  raises the possibility of prosthesis loosening. The examination is  otherwise unremarkable with no definite stress fracture seen.     OLGA GALLARDO MD

## 2018-03-16 NOTE — PROGRESS NOTES
Ambulated w/ pt up and down hallway.  Pt is stand by assist to independent. Pt was not lightheaded, dizzy, or SOB.  Pt stated that she felt weak.

## 2018-03-16 NOTE — PLAN OF CARE
Problem: Patient Care Overview  Goal: Discharge Needs Assessment  Outcome: Adequate for Discharge Date Met: 03/16/18  Patient's After Visit Summary was reviewed with patient and/or family.   Patient verbalized understanding of After Visit Summary, recommended follow up and was given an opportunity to ask questions.   Discharge medications sent home with patient/family: No   Discharged with daughter      OBSERVATION patient END time: 1510

## 2018-03-16 NOTE — PLAN OF CARE
Problem: Patient Care Overview  Goal: Discharge Needs Assessment  Outcome: Improving  Problem: Patient Care Overview  Goal: Discharge Needs Assessment  Outcome: Improving  PRIMARY DIAGNOSIS: GASTROENTERITIS     OUTPATIENT/OBSERVATION GOALS TO BE MET BEFORE DISCHARGE  1. Orthostatic performed: N/A     2. Tolerating PO fluid and/or antibiotics (if applicable): Yes     3. Nausea/Vomiting/Diarrhea symptoms improved: No, still having loose stools but are becoming less     4. Pain status: Denies pain     5. Return to near baseline physical activity: Yes     Discharge Planner Nurse   Safe discharge environment identified: Yes  Barriers to discharge: No       Entered by: Kailey Orlando 03/16/2018 8:00am    Please review provider order for any additional goals.   Nurse to notify provider when observation goals have been met and patient is ready for discharge.     Alert and oriented x4. VSS except low grade temp of 99.7, patient is denying tylenol, but educated on use of IS and will continue to monitor. Patient denies pain, but does have cramping which she denies any intervention. Patient up SBA. Patient has had multiple loose stools since the overnight shift but denies blood and states she overall feels better and would be okay with discharging home today. Patient is tolerating regular diet and fluids. Will continue to monitor and await possible d/c orders. Enteric panel negative- will add on c diff.

## 2018-03-16 NOTE — H&P
"[facility]    History and Physical  Hospitalist       Date of Admission:  3/15/2018  Date of Service (when I saw the patient): 03/15/18    Assessment & Plan   Kayley Putnam is a 84 year old female who presents with acute onset bright red blood per rectum and diarrhea.    BRBPR, colitis, possibly infectious  --acute onset.  Thinks maybe due to salad from [employer].  CT abdomen (outside facility) demonstrated asymmetric colitis of the descending colon - infectious colitis vs. Cannot rule out lymphoma.  Symptoms would be more consistent with infectious colitis.  Last colonoscopy ~9 years ago reported \"normal\".  Acute symptoms would not be consistent with ischemic colitis either.  Normal lactate.  Lytes at outside facility reviewed and WNL.    PLAN:  1. Stool studies for enteric pathogens  2. Repeat CBC in AM, sooner if recurrent bleeding    Chronic atrial fibrillation, on warfarin  --INR 2.57.  Hold evening dose.  Check INR in AM  --resume metoprolol on discharge    Pancytopenia due to myelodysplastic syndrome  --follows q2 weeks with [facility].  Hemoglobin and WBC stable - note ANC is 1400, platelets slowly declining but >50K (unlikely to have spontaneous bleeding at this level).  Recommended discussing sooner f/u with oncologist given decreasing platelet count.    Hypertension: may resume HCTZ, metoprolol, lisinopril at discharge    DVT Prophylaxis: VTE Prophylaxis contraindicated due to possible bleed  Code Status: Full Code    Disposition: Expected discharge 3/16 if no recurrent bleeding, hemoglobin stable.     Brianna Jernigan PA-C    Primary Care Physician   Taj Gordillo    Chief Complaint   BRBPR, diarrhea    History is obtained from the patient    History of Present Illness   Kayley Putnam is a 84 year old female with history of atrial fibrillation, hypertension, and myelodysplastic syndrome admitted for BRBPR and diarrhea.  Reports significant diarrhea overnight, too many to count, then " episode of BRBPR consisting of red clots not mixed with stool x 1 episode.  Subsequently had 1-2 more episodes of diarrhea with flecks of blood in the stool.  Denies dizziness or light-headedness.  No weight loss.  Reports ongoing lower abdominal cramping.  Diarrhea has slowed, last episode late afternoon.      Past Medical History    Myelodysplastic syndrome  Hypertension  Atrial fibrillation  Dyslipidemia    Past Surgical History   Last colonoscopy 9 years prior    Prior to Admission Medications   Prior to Admission Medications   Prescriptions Last Dose Informant Patient Reported? Taking?   Acetaminophen (TYLENOL EXTRA STRENGTH PO) 3/14/2018 at Unknown time  Yes Yes   Sig: Take 500 mg by mouth At Bedtime    Ascorbic Acid (VITAMIN C) 500 MG CAPS 3/15/2018 at am  Yes Yes   Sig: Take 500 mg by mouth daily    amoxicillin (AMOXIL) 500 MG capsule dental appt  Yes Yes   Sig: Take 500 mg by mouth 4 tablets prior to dental appointments   atorvastatin (LIPITOR) 20 MG tablet 3/14/2018 at Unknown time  Yes Yes   Sig: Take 20 mg by mouth At Bedtime    calcium-vitamin D (CALCIUM 600 + D) 600-400 MG-UNIT per tablet Past Week at Unknown time  Yes Yes   Sig: Take 1 tablet by mouth 2 times daily   darbepoetin john-polysorbate (ARANESP, ALBUMIN FREE,) 300 MCG/ML SOLN 3/14/2018 at Unknown time  Yes Yes   Sig: Inject Subcutaneous every 14 days Inject every 2 weeks    hydrochlorothiazide (HYDRODIURIL) 25 MG tablet 3/15/2018 at am  Yes Yes   Sig: Take 25 mg by mouth daily   lisinopril (PRINIVIL,ZESTRIL) 10 MG tablet 3/15/2018 at am  Yes Yes   Sig: Take 10 mg by mouth daily   metoprolol (TOPROL-XL) 25 MG 24 hr tablet 3/15/2018 at am  Yes Yes   Sig: Take 25 mg by mouth 2 times daily   warfarin (COUMADIN) 4 MG tablet 3/14/2018 at 6mg  Yes Yes   Simg=Tue and Fri, 6mg=ROW      Facility-Administered Medications: None     Allergies   Allergies   Allergen Reactions     Amlodipine      Codeine      Flecainide      Meperidine       [facility]        Social History   I have personally reviewed the social history with the patient showing Lives independently in a senior condo.  no alcohol or tobacco.    Family History   Family history reviewed with patient and is noncontributory.    Review of Systems   The 10 point Review of Systems is negative other than noted in the HPI or here.     Physical Exam   Temp: 96.9  F (36.1  C) Temp src: Oral BP: 170/65 Pulse: 79   Resp: 18 SpO2: 98 % O2 Device: None (Room air)    Vital Signs with Ranges  Temp:  [96.9  F (36.1  C)-97.5  F (36.4  C)] 96.9  F (36.1  C)  Pulse:  [79] 79  Resp:  [18-20] 18  BP: (156-171)/(65-89) 170/65  SpO2:  [97 %-100 %] 98 %  194 lbs 0 oz    Constitutional: nontoxic appearing woman in no distress.   Eyes: no icterus  HEENT: mucous membranes moist  Respiratory: clear to auscultation bilaterally  Cardiovascular: irregularly irregular  GI: normoactive bowel sounds, soft, nontender, no guarding or rebound.    Lymph/Hematologic:  No abnormal bruising.   Genitourinary: not assessed, no catheter  Skin: warm and dry  Musculoskeletal: normal muscle bulk and tone.   Neurologic: grossly nonfocal  Psychiatric: alert and oriented to person, place, situation    Data   Data reviewed today:  I personally reviewed no images or EKG's today.    Recent Labs  Lab 03/15/18  1734   WBC 2.8*   HGB 10.8*   *   PLT 80*   INR 2.57*       Imaging:  No results found for this or any previous visit (from the past 24 hour(s)).

## 2018-03-16 NOTE — PLAN OF CARE
Problem: Patient Care Overview  Goal: Discharge Needs Assessment  Outcome: Improving  PRIMARY DIAGNOSIS: GASTROENTERITIS    OUTPATIENT/OBSERVATION GOALS TO BE MET BEFORE DISCHARGE  1. Orthostatic performed: N/A    2. Tolerating PO fluid and/or antibiotics (if applicable): Yes    3. Nausea/Vomiting/Diarrhea symptoms improved: No,  a few loose stools    4. Pain status: Improved with use of alternative comfort measures i.e.: heat    5. Return to near baseline physical activity: Yes    Discharge Planner Nurse   Safe discharge environment identified: Yes  Barriers to discharge: No       Entered by: Kell Dalal 03/16/2018 5:23 AM     Please review provider order for any additional goals.   Nurse to notify provider when observation goals have been met and patient is ready for discharge.    Tmax 100.6while using heating pad, tylenol offered, pt refused. Continues to experience abd cramping. Up to bathroom ad mazin. Voiding without saving, continues to have small BMs when voiding, denies presence of blood. Enteric panel negative, precautions discontinued. Alert and oriented, able to make needs known. Continue to monitor.

## 2018-03-16 NOTE — PLAN OF CARE
Problem: Patient Care Overview  Goal: Plan of Care/Patient Progress Review  Outcome: No Change  PRIMARY DIAGNOSIS: GASTROENTERITIS    OUTPATIENT/OBSERVATION GOALS TO BE MET BEFORE DISCHARGE  1. Orthostatic performed: N/A    2. Tolerating PO fluid and/or antibiotics (if applicable): Yes    3. Nausea/Vomiting/Diarrhea symptoms improved: No,  a few loose stools, watery and loose (per pt 3 this afternoon in ED)    4. Pain status: cramping lower abdmoninal    5. Return to near baseline physical activity: Yes    Discharge Planner Nurse   Safe discharge environment identified: Yes  Barriers to discharge: Yes       Entered by: Dorinda Silva 03/15/2018 9:04 PM          Please review provider order for any additional goals.   Nurse to notify provider when observation goals have been met and patient is ready for discharge.

## 2018-03-16 NOTE — PHARMACY-ANTICOAGULATION SERVICE
Clinical Pharmacy- Warfarin Discharge Note  This patient is currently on warfarin for the treatment of Atrial fibrillation.  INR Goal= 2-3  Expected length of therapy per PCP.    Anticoagulation Dose History     Recent Dosing and Labs Latest Ref Rng & Units 4/6/2009 4/7/2009 4/8/2009 4/9/2009 4/10/2009 3/15/2018 3/16/2018    INR 0.86 - 1.14 1.18(H) 1.12 1.23(H) 1.61(H) 1.56(H) 2.57(H) 2.60(H)          Recommend discharging the patient on a warfarin regimen of  No dose tonight (3/16/18) then 2mg (0.5 tab of 4mg tabs) on 3/17/18 and 3/18/18 and then as directed by PCP afterwards, Pt will be using [facility] warfarin supply.    The patient should have an INR checked on 3/19/18.    Briana Kaur, PharmD  March 16, 2018

## 2018-04-25 ENCOUNTER — TRANSFERRED RECORDS (OUTPATIENT)
Dept: HEALTH INFORMATION MANAGEMENT | Facility: CLINIC | Age: 83
End: 2018-04-25

## 2018-04-25 ENCOUNTER — HOSPITAL ENCOUNTER (OUTPATIENT)
Dept: LAB | Facility: CLINIC | Age: 83
Discharge: HOME | End: 2018-04-25
Attending: INTERNAL MEDICINE | Admitting: INTERNAL MEDICINE
Payer: MEDICARE

## 2018-04-25 DIAGNOSIS — D64.9 ANEMIA: ICD-10-CM

## 2018-04-25 LAB
ABO + RH BLD: NORMAL
ABO + RH BLD: NORMAL
BLD GP AB SCN SERPL QL: NORMAL
BLD PROD TYP BPU: NORMAL
BLOOD BANK CMNT PATIENT-IMP: NORMAL
NUM BPU REQUESTED: 2
SPECIMEN EXP DATE BLD: NORMAL

## 2018-04-25 PROCEDURE — 86850 RBC ANTIBODY SCREEN: CPT | Performed by: INTERNAL MEDICINE

## 2018-04-25 PROCEDURE — 86923 COMPATIBILITY TEST ELECTRIC: CPT | Performed by: INTERNAL MEDICINE

## 2018-04-25 PROCEDURE — 36415 COLL VENOUS BLD VENIPUNCTURE: CPT | Performed by: INTERNAL MEDICINE

## 2018-04-25 PROCEDURE — 86901 BLOOD TYPING SEROLOGIC RH(D): CPT | Performed by: INTERNAL MEDICINE

## 2018-04-25 PROCEDURE — 86900 BLOOD TYPING SEROLOGIC ABO: CPT | Performed by: INTERNAL MEDICINE

## 2018-04-25 RX ORDER — ACETAMINOPHEN 325 MG/1
650 TABLET ORAL ONCE
Status: CANCELLED
Start: 2018-04-25 | End: 2018-04-25

## 2018-04-26 ENCOUNTER — INFUSION THERAPY VISIT (OUTPATIENT)
Dept: INFUSION THERAPY | Facility: CLINIC | Age: 83
End: 2018-04-26
Attending: NURSE PRACTITIONER
Payer: MEDICARE

## 2018-04-26 VITALS
HEART RATE: 73 BPM | DIASTOLIC BLOOD PRESSURE: 61 MMHG | OXYGEN SATURATION: 99 % | RESPIRATION RATE: 16 BRPM | SYSTOLIC BLOOD PRESSURE: 144 MMHG | TEMPERATURE: 97.7 F

## 2018-04-26 DIAGNOSIS — D64.9 ANEMIA: Primary | ICD-10-CM

## 2018-04-26 LAB
BLD PROD TYP BPU: NORMAL
BLD PROD TYP BPU: NORMAL
BLD UNIT ID BPU: 0
BLD UNIT ID BPU: 0
BLOOD PRODUCT CODE: NORMAL
BLOOD PRODUCT CODE: NORMAL
BPU ID: NORMAL
BPU ID: NORMAL
TRANSFUSION STATUS PATIENT QL: NORMAL

## 2018-04-26 PROCEDURE — 25000132 ZZH RX MED GY IP 250 OP 250 PS 637: Mod: GY | Performed by: NURSE PRACTITIONER

## 2018-04-26 PROCEDURE — P9016 RBC LEUKOCYTES REDUCED: HCPCS | Performed by: INTERNAL MEDICINE

## 2018-04-26 PROCEDURE — 36430 TRANSFUSION BLD/BLD COMPNT: CPT

## 2018-04-26 PROCEDURE — A9270 NON-COVERED ITEM OR SERVICE: HCPCS | Mod: GY | Performed by: NURSE PRACTITIONER

## 2018-04-26 RX ORDER — ACETAMINOPHEN 325 MG/1
650 TABLET ORAL ONCE
Status: COMPLETED | OUTPATIENT
Start: 2018-04-26 | End: 2018-04-26

## 2018-04-26 RX ADMIN — ACETAMINOPHEN 650 MG: 325 TABLET ORAL at 11:49

## 2018-04-26 NOTE — PROGRESS NOTES
Infusion Nursing Note:  Kayley Putnam presents today for 2 units PRBC.    Patient seen by provider today: No   present during visit today: Not Applicable.    Note: Reviewed risks and side effects of blood transfusion and gave printed instructions for post transfusion.    Intravenous Access:  Peripheral IV placed.    Treatment Conditions:  Blood transfusion consent signed 4/26/18.  HGB 6.6 on 4/25.      Post Infusion Assessment:  Patient tolerated infusion without incident.  Blood return noted pre and post infusion.  Site patent and intact, free from redness, edema or discomfort.  No evidence of extravasations.  Access discontinued per protocol.    Discharge Plan:   Discharge instructions reviewed with: Patient.  Patient and/or family verbalized understanding of discharge instructions and all questions answered.  Patient discharged in stable condition accompanied by: self.  Departure Mode: Ambulatory.    Janneth Villanueva RN

## 2018-04-26 NOTE — MR AVS SNAPSHOT
"              After Visit Summary   4/26/2018    Kayley Putnam    MRN: [id number]           Patient Information     Date Of Birth          7/9/1933        Visit Information        Provider Department      4/26/2018 11:30 AM [facility] INFUSION CHAIR 5 [facility]        Today's Diagnoses     Anemia    -  1       Follow-ups after your visit        Future tests that were ordered for you today     Open Future Orders        Priority Expected Expires Ordered    ABO/Rh type and screen Routine 4/25/2018 5/2/2018 4/25/2018            Who to contact     If you have questions or need follow up information about today's clinic visit or your schedule please contact [facility] INFUSION SERVICES directly at [phone].  Normal or non-critical lab and imaging results will be communicated to you by 8bithart, letter or phone within 4 business days after the clinic has received the results. If you do not hear from us within 7 days, please contact the clinic through 8bithart or phone. If you have a critical or abnormal lab result, we will notify you by phone as soon as possible.  Submit refill requests through Tripl or call your pharmacy and they will forward the refill request to us. Please allow 3 business days for your refill to be completed.          Additional Information About Your Visit        MyChart Information     Tripl lets you send messages to your doctor, view your test results, renew your prescriptions, schedule appointments and more. To sign up, go to [email or web address] . Click on \"Log in\" on the left side of the screen, which will take you to the Welcome page. Then click on \"Sign up Now\" on the right side of the page.     You will be asked to enter the access code listed below, as well as some personal information. Please follow the directions to create your username and password.     Your access code is: [email or web address]  Expires: 6/13/2018  6:38 PM     Your access " code will  in 90 days. If you need help or a new code, please call your [facility] clinic or [phone].        Care EveryWhere ID     This is your Care EveryWhere ID. This could be used by other organizations to access your [facility] medical records  [id number]        Your Vitals Were     Pulse Temperature Respirations Pulse Oximetry          73 97.7  F (36.5  C) (Tympanic) 16 99%         Blood Pressure from Last 3 Encounters:   18 144/61   18 139/44   16 142/60    Weight from Last 3 Encounters:   03/15/18 88 kg (194 lb)   16 89.5 kg (197 lb 4.8 oz)   07/30/15 89.7 kg (197 lb 12.8 oz)              We Performed the Following     Transfuse red blood cell unit     Transfuse red blood cell unit        Primary Care Provider Office Phone # Fax #    Taj Gordillo [phone]       [facility] [address] [place] [address]        Equal Access to Services     [facility]: Hadii aad ku hadasho Soomaali, waaxda luqadaha, qaybta kaalmada adeegyada, waxay idiin hayaan erica cruz . So [facility] [phone].    ATENCIÓN: Si habla español, tiene a bear disposición servicios gratuitos de asistencia lingüística. Llame al [phone].    We comply with applicable federal civil rights laws and [place] laws. We do not discriminate on the basis of race, color, national origin, age, disability, sex, sexual orientation, or gender identity.            Thank you!     Thank you for choosing [facility] INF[facility]  for your care. Our goal is always to provide you with excellent care. Hearing back from our patients is one way we can continue to improve our services. Please take a few minutes to complete the written survey that you may receive in the mail after your visit with us. Thank you!             Your Updated Medication List - Protect others around you: Learn how to safely use, store and throw away your medicines at  www.disposemymeds.org.          This list is accurate as of 4/26/18  3:22 PM.  Always use your most recent med list.                   Brand Name Dispense Instructions for use Diagnosis    amoxicillin 500 MG capsule    AMOXIL     Take 500 mg by mouth 4 tablets prior to dental appointments        ARANESP (ALBUMIN FREE) 300 MCG/ML Soln   Generic drug:  darbepoetin john-polysorbate      Inject Subcutaneous every 14 days Inject every 2 weeks        calcium 600 + D 600-400 MG-UNIT per tablet   Generic drug:  calcium-vitamin D      Take 1 tablet by mouth 2 times daily        FUROSEMIDE PO      Take 20 mg by mouth daily        LIPITOR 20 MG tablet   Generic drug:  atorvastatin      Take 20 mg by mouth At Bedtime        metoprolol succinate 25 MG 24 hr tablet    TOPROL-XL     Take 25 mg by mouth 2 times daily        TYLENOL EXTRA STRENGTH PO      Take 500 mg by mouth At Bedtime        Vitamin C 500 MG Caps      Take 500 mg by mouth daily        warfarin 4 MG tablet    COUMADIN    30 tablet    Take 0.5 tablets (2 mg) by mouth daily 4mg=Tue and Fri, 6mg=ROW    Persistent atrial fibrillation (H)

## 2020-01-30 ENCOUNTER — TRANSFERRED RECORDS (OUTPATIENT)
Dept: SURGERY | Facility: CLINIC | Age: 85
End: 2020-01-30

## 2020-02-04 ENCOUNTER — TELEPHONE (OUTPATIENT)
Dept: SURGERY | Facility: CLINIC | Age: 85
End: 2020-02-04

## 2020-02-04 ENCOUNTER — PREP FOR PROCEDURE (OUTPATIENT)
Dept: SURGERY | Facility: CLINIC | Age: 85
End: 2020-02-04

## 2020-02-04 ENCOUNTER — OFFICE VISIT (OUTPATIENT)
Dept: SURGERY | Facility: CLINIC | Age: 85
End: 2020-02-04
Payer: MEDICARE

## 2020-02-04 VITALS
BODY MASS INDEX: 27.99 KG/M2 | HEIGHT: 65 IN | WEIGHT: 168 LBS | DIASTOLIC BLOOD PRESSURE: 52 MMHG | SYSTOLIC BLOOD PRESSURE: 112 MMHG | HEART RATE: 58 BPM | RESPIRATION RATE: 16 BRPM | OXYGEN SATURATION: 98 %

## 2020-02-04 DIAGNOSIS — C50.911 MALIGNANT NEOPLASM OF RIGHT FEMALE BREAST, UNSPECIFIED ESTROGEN RECEPTOR STATUS, UNSPECIFIED SITE OF BREAST (H): Primary | ICD-10-CM

## 2020-02-04 DIAGNOSIS — Z79.01 ANTICOAGULATED ON COUMADIN: ICD-10-CM

## 2020-02-04 DIAGNOSIS — R92.0 MICROCALCIFICATION OF RIGHT BREAST ON MAMMOGRAM: Primary | ICD-10-CM

## 2020-02-04 PROCEDURE — 99204 OFFICE O/P NEW MOD 45 MIN: CPT | Performed by: SURGERY

## 2020-02-04 ASSESSMENT — MIFFLIN-ST. JEOR: SCORE: 1194.98

## 2020-02-04 NOTE — LETTER
[facility]    [address], [address]  [place], [place] [address]  Phone [phone]  Fax [phone] [address], [address]  [place] [address]  Phone [phone]  Fax [phone]    [email or web address]   February 4, 2020      Kayley GARETH Putnam  [address]  [address]  [facility] [address]      We realize with scheduling surgery, one of your first questions is, how much will this cost?  Below we have provided you with the information you will need to receive an estimate for your surgery.    You are scheduled for the following procedure:  Right breast seed localized lumpectomy with right sentinel node biopsy      Surgeon:  Dr. Pope     Physician Assistant:  Yes      Please make sure to have your insurance card available at the time of calling.    Surgeon & Physician Assistant charges and facility charges for [facility], [facility] or [facility] Center:    Consumer Price Line at [phone]   -  It is important to note that there may be a Physician Assistant assisting with your surgery.  Please be sure to mention this when calling for the estimate.      Facility Charges at [facility] [place], [facility] [place] or [facility]:  [facility] [place] at [phone]  [facility] [place] at [phone]  [facility] at [phone] or [phone]    Anesthesiologist Charges:  [facility] at [phone]    [facility] - Nurse Anesthetist Charges:  [facility] at [phone]

## 2020-02-04 NOTE — PATIENT INSTRUCTIONS
RIGHT BREAST STEREOTACTIC BIOPSY     Date: 2-11-20   Time: 1:00 PM   Location: [facility]  [address]  [address]  [place]  [address]          Please check in at  12:00 PM     Stop Coumadin 2 days before your biopsy

## 2020-02-04 NOTE — PROGRESS NOTES
Breast Patients      BREAST PATIENTS (FEMALE)    At what age did your periods begin? 12    What was the date of your last menstrual period? 1974    Have you begun menopause? Yes  Age Menopause began:  41had hysterectomy     Are you using hormone replacement therapy?  No    Number of full-term pregnancies: 1    Did you nurse your children? No    Are you pregnant now? No    Do you have breast implants? No         BREAST PATIENTS (ALL)    Have you had a previous breast biopsy? Yes  Side: left   Date: 2014 & 2015 ?    Have you had previous Breast Cancer? No

## 2020-02-04 NOTE — TELEPHONE ENCOUNTER
Type of surgery: RIGHT BREAST SEED LOCALIZED LUMPECTOMY, WITH RIGHT SENTINEL NODE BIOPSY   Location of surgery: [facility]  Date and time of surgery: 3-4-20, 10 AM   Surgeon: DR. CRAWFORD   Pre-Op Appt Date: PATIENT TO SCHEDULE   Post-Op Appt Date: PATIENT TO SCHEDULE    Packet sent out: GIVEN TO PATIENT   Pre-cert/Authorization completed:  Not Applicable  Date: 2-4-20      RIGHT BREAST SEED LOCALIZED LUMPECTOMY WITH RIGHT SENTINEL NODE BIOPSY   GENERAL   PT INST TO HAVE H&P WITH DR. COTA   2 HRS REQ   PA ASSIST MGB  ALW   R Seed loc at 8 am  alw   R Sn Inj at 9 am  alw

## 2020-02-04 NOTE — PROGRESS NOTES
[facility]  New Patient Office Visit    Assessment:    Kayley Putnam is seen in consultation for right breast cancer, at the request of Taj Gordillo MD.    Kayley is a 86 year old female with right breast invasive ductal carcinoma, grade 1-2, ER +, AK -, [id number] - measuring 1.7 cm (US) at 8:00 and 6 cm from the nipple. Currently stage 1A (T1c, cN0, cM0).    She additionally has an area of calcifications at the 10:00 position of her right breast which has never been biopsied. 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 Therefore, we will schedule a seated sterotactic biopsy at [facility] to rule out synchronous masses. This time, I recommended that she hold her warfarin for 2 days and have an INR <1.5 on the day of the biopsy.     Plan:  We have had a detailed discussion on treatment of breast cancer which may include a combination of surgery, chemotherapy, endocrine therapy, radiation of which the use and timing depending on the specifics of their cancer.     Surgical options were discussed including lumpectomy, mastectomy, bilateral mastectomies, sentinel lymph node biopsy with possible axillary node dissection and reconstructive options have been offered and discussed at length.  I feel the cosmetic outcome with lumpectomy would be acceptable.    We have discussed the indication, alternatives, risks and expected recovery.  Specifically, we have discussed local recurrence of cancer, risk of future second primary breast cancer, incisions, scarring, breast deformity, volume loss, possible need for axillary lymphadenectomy, postoperative infection, anesthesia, bleeding, blood transfusion, DVT, PE, postoperative restrictions and physical limitations.  We have discussed the recommended interventions and treatments for these outcomes.    All questions have been answered to the  best of my ability.    Breast MRI:  yes  Oncology consult:  Yes, of note she is already a patient of Dr. Norton for Myelodysplastic Syndrome.   Plastic surgery consult:  no  Radiation oncology consult:  yes    Will discuss further when seated stereotactic biopsy is complete.  Tentatively elects for seed localized lumpectomy and sentinel lymph node biopsy. If the second suspicious area at 10:00 is biopsy positive, she would be interested in 2 lumpectomies. I will have her hold her warfarin for 5 days prior to surgery.     Recommended time off work postop:  Not Applicable     HPI:  Kayley Putnam is a 86 year old female who presents to discuss her recently diagnosed invasive ductal carcinoma.  This was diagnosed via right breast ultrasound and core needle biopsy. All previous workup was through the [facility] system and records are scanned into Epic.  Previously, she has undergone mammograms which have shown grouped punctate and pleomorphic calcifications of the right breast at 9-10:00. A stereotactic biopsy was attempted in 7/18, but aborted due to patient discomfort with positioning. Since then, she has undergone mammograms every 6 months, which have shown stability of this area. After this most recent mammogram, an ultrasound was performed to assess for any associated masses. There was not a mass at the area of calcifications, but at 8:00, a 1.7 cm hypoechoic breast mass was identified. This was biopsied and returned as invasive ductal carcinoma, grade 1-2, ER+, AZ-, HER2-. Of note, she is on warfarin for atrial fibrillation and also has myelodysplastic syndrome with baseline platelets typically in the 60-70 range. Following her biopsy 5 days ago, she has had significant bruising and likely hematoma formation. INR at the time of biopsy was reportedly 1.8.     Breast mass noted:  None   Skin rashes, dimpling or nipple changes:  none  Nipple discharge:  none  Breast pain:   No  Perform self breast exams:  "Yes    Previous breast biopsies: Yes - in 2014 of the left breast, benign  Previous cyst aspiration: No  Previous other breast surgery: No    Hormonal history:    First menstrual period: 12 years old  First live birth: 29 years old  Post menopausal (hysterectomy and BSO in 1974 for fibroids and endometriosis)  HRT Yes - for several years (16 years per patient estimate)  Fertility treatment: No    Family History:  Family history of breast cancer: No  Family history of ovarian cancer:  No  Family history of colon cancer: No  Family history of prostate cancer: No    Imaging:   All imaging studies reviewed by me.        Percutaneous core needle biopsy:  (Ultrasound guided 1/30/20) invasive ductal carcinoma. Grade 1-2  ER +, IA -, HER2 -     Past Medical History:  Past Medical History:   Diagnosis Date     AF (atrial fibrillation) (H)      Atherosclerosis of aorta (H)      CAD (coronary artery disease)      HTN (hypertension)      Hyperlipidemia      Myelodysplastic syndrome (H)      Overweight and obesity(278.0)      Past Surgical History:  No past surgical history on file.     Social History:  , non-smoker, minimal alcohol use, has one daughter, Chloe, who is with her in clinic today.   Occupation: Retired.     ROS:  The 10 point review of systems is negative other than noted in the HPI and above.    PE:  Vitals: /52   Pulse 58   Resp 16   Ht 1.638 m (5' 4.5\")   Wt 76.2 kg (168 lb)   SpO2 98%   BMI 28.39 kg/m    General appearance: well-nourished, sitting comfortably, no apparent distress  HEENT:  Head normocephalic and atraumatic, pupils equal and round, conjunctivae clear, mucous membranes moist, external ears and nose normal  Neck: Supple without thyromegaly or masses  Lungs: Respirations unlabored  CV: regular rate and rhythm  Lymphatic: No cervical, or supraclavicular lymphadenopathy  Musculoskeletal:  Normal station and gait, extremities without edema  Neurologic: alert, speech is clear, moves " all extremities with good strength  Psychiatric: Mood and affect are appropriate  Skin: Without lesions, rashes, or jaundice  Breast:    Substantial ecchymosis and swelling (likely hematoma) of the right breast due to biopsy 5 days ago   Otherwise no skin or nipple changes.     Contour shows asymmetry of the right breast due to biopsy-related swelling/hematoma.   Parenchyma is soft aside from at 8:00 on the right where there is a ~ 5 cm firmness consistent with hematoma   Masses- not palpable due to presence of hematoma   Ecchymosis- right, lower outer quadrant, lower inner quadrant   Incisional scar- none    Lymph:       No supraclavicular/infraclavicular adenopathy.   Axillary adenopathy: none      This note may have been created using voice recognition software. Undetected word substitutions or other errors may have occurred.     Time spent with the patient with greater that 50% of the time in discussion was 55 minutes.     Lali Pope MD  02/04/20 10:16 AM     Please route or send letter to:  Primary Care Provider (PCP) and Vince Norton

## 2020-02-07 ENCOUNTER — TELEPHONE (OUTPATIENT)
Dept: MAMMOGRAPHY | Facility: CLINIC | Age: 85
End: 2020-02-07

## 2020-02-07 DIAGNOSIS — Z79.01 ANTICOAGULATED: Primary | ICD-10-CM

## 2020-02-07 NOTE — TELEPHONE ENCOUNTER
Ms. Putnam, is scheduled for a Right Breast Seated Stereotactic Breast Biopsy on 2/11/2020.  Ms Putnam is on Coumadin for Chronic Atrial Fibrillation.  Upon review of patients chart I noted in Dr. Lali Pope's Surgical Consult note that Kayley should hold her Coumadin 2 days prior to the Biopsy and get an INR the morning of biopsy. I discussed this with Ms. Putnam and she reports she has stopped her coumadin before without bridging for other procedures.  Kayley also has a history of Myelodysplastic syndrome and her platelet counts are below normal.  She is scheduled for an INR and platelets at the [facility] on 2/11/2020 at 11:45 a.m. the Breast biopsy is scheduled for 1:00pm.  Radiologist, Dr. Mike Braswell ([facility]) is aware of plan.  Kayley also states understanding.

## 2020-02-11 ENCOUNTER — HOSPITAL ENCOUNTER (OUTPATIENT)
Dept: MAMMOGRAPHY | Facility: CLINIC | Age: 85
Discharge: HOME | End: 2020-02-11
Attending: SURGERY | Admitting: SURGERY
Payer: MEDICARE

## 2020-02-11 DIAGNOSIS — Z79.01 ANTICOAGULATED ON COUMADIN: ICD-10-CM

## 2020-02-11 DIAGNOSIS — R92.0 MICROCALCIFICATION OF RIGHT BREAST ON MAMMOGRAM: ICD-10-CM

## 2020-02-11 LAB
CAPILLARY BLOOD COLLECTION: NORMAL
ERYTHROCYTE [DISTWIDTH] IN BLOOD BY AUTOMATED COUNT: ABNORMAL % (ref 10–15)
HCT VFR BLD AUTO: ABNORMAL % (ref 35–47)
HGB BLD-MCNC: ABNORMAL G/DL (ref 11.7–15.7)
INR PPP: 1.6 (ref 0.86–1.14)
MCH RBC QN AUTO: ABNORMAL PG (ref 26.5–33)
MCHC RBC AUTO-ENTMCNC: ABNORMAL G/DL (ref 31.5–36.5)
MCV RBC AUTO: ABNORMAL FL (ref 78–100)
PLATELET # BLD AUTO: 55 10E9/L (ref 150–450)
RBC # BLD AUTO: ABNORMAL 10E12/L (ref 3.8–5.2)
WBC # BLD AUTO: ABNORMAL 10E9/L (ref 4–11)

## 2020-02-11 PROCEDURE — 88305 TISSUE EXAM BY PATHOLOGIST: CPT | Performed by: RADIOLOGY

## 2020-02-11 PROCEDURE — 36416 COLLJ CAPILLARY BLOOD SPEC: CPT | Performed by: INTERNAL MEDICINE

## 2020-02-11 PROCEDURE — 19081 BX BREAST 1ST LESION STRTCTC: CPT | Mod: RT

## 2020-02-11 PROCEDURE — 88305 TISSUE EXAM BY PATHOLOGIST: CPT | Mod: 26 | Performed by: RADIOLOGY

## 2020-02-11 PROCEDURE — 25000125 ZZHC RX 250: Performed by: RADIOLOGY

## 2020-02-11 PROCEDURE — 85027 COMPLETE CBC AUTOMATED: CPT | Performed by: INTERNAL MEDICINE

## 2020-02-11 PROCEDURE — 85610 PROTHROMBIN TIME: CPT | Performed by: INTERNAL MEDICINE

## 2020-02-11 RX ORDER — LIDOCAINE HYDROCHLORIDE 10 MG/ML
5 INJECTION, SOLUTION INFILTRATION; PERINEURAL ONCE
Status: COMPLETED | OUTPATIENT
Start: 2020-02-11 | End: 2020-02-11

## 2020-02-11 RX ADMIN — LIDOCAINE HYDROCHLORIDE 5 ML: 10 INJECTION, SOLUTION INFILTRATION; PERINEURAL at 13:39

## 2020-02-11 RX ADMIN — LIDOCAINE HYDROCHLORIDE 20 ML: 10; .005 INJECTION, SOLUTION EPIDURAL; INFILTRATION; INTRACAUDAL; PERINEURAL at 13:40

## 2020-02-11 NOTE — DISCHARGE INSTRUCTIONS
After Your Breast Biopsy    Bleeding or bruising: Slight bruising is normal.  If you bleed through the bandage, put direct pressure on the breast.  If you are still bleeding after 20 minutes, call the doctor who ordered the exam.    Bandages: Keep your bandage in place until tomorrow morning.  Do not get it wet.  Leave the tape in place for two days.  On the second day, cover it with a Band-Aid.    Activity: You may shower the morning after the exam.  No heavy activity (lifting, vacuuming) for 24 hours.    Discomfort: Wear your bra overnight to support the breast.  You may take Tylenol (acetaminophen) for pain.  If you had a stereotactic of MR-directed biopsy, you may take aspirin or ibuprofen (Advil, Motrin) the morning after your biopsy, unless your doctor tells you not to.    Infection: Infection is rare.  Symptoms include fever, redness, increasing pain and fluid draining from the biopsy site.  If you have any of these symptoms, please call the doctor who ordered your exam.    Results: Results may take up to three business days.  If you have not heard your results in three days, call the Breast Center Nurse at [phone] or [phone].  In rare cases, we may need to do another biopsy.    Call the doctor who ordered your exam if:    You have bleeding that lasts more than 20 minutes.    You have pain that cannot be controlled.    You have signs of infection (fever, redness, drainage or other signs).    You have not had your results within three days.    Nurse navigator: Our nurse navigator is here to answer your questions and help you set up future clinic visits.  Please call [phone].    Thank you for choosing [facility].  Please call us if you have questions or concerns about your biopsy.

## 2020-02-12 LAB — COPATH REPORT: NORMAL

## 2020-02-12 NOTE — PROGRESS NOTES
After review by Breast Center Radiologist, Dr. Barrie Hatch, Ms. Putnam was called and  given her 02/11/2020 Right Breast (10:30 Location) Biopsy Pathology results (Benign tissue with calcification) and Follow up Recommendations (Per surgeon and oncologist for other area of Known Breast cancer).  Kayley denies any new post biopsy site issues. I encouraged her to notify her doctor if any breast changes or concerns arise.    Christine Hinson BSN, RN  Procedure Nurse  [facility]  [phone]

## 2020-02-13 ENCOUNTER — TELEPHONE (OUTPATIENT)
Dept: SURGERY | Facility: CLINIC | Age: 85
End: 2020-02-13

## 2020-02-13 NOTE — TELEPHONE ENCOUNTER
Called patient to discuss most recent biopsy results. The area of calcifications was benign, so we seem to just be dealing with a 1.7 cm invasive ductal at 8:00, 6 cm from the right nipple. She is eligible for a lumpectomy with sentinel lymph node biopsy and this has been arranged for 3/4/20.     Lali Pope MD

## 2020-02-21 ENCOUNTER — OFFICE VISIT (OUTPATIENT)
Dept: SURGERY | Facility: CLINIC | Age: 85
End: 2020-02-21
Payer: MEDICARE

## 2020-02-21 ENCOUNTER — HOSPITAL ENCOUNTER (INPATIENT)
Facility: CLINIC | Age: 85
LOS: 3 days | Discharge: HOME | DRG: 908 | End: 2020-02-24
Attending: SURGERY | Admitting: SURGERY
Payer: MEDICARE

## 2020-02-21 ENCOUNTER — TELEPHONE (OUTPATIENT)
Dept: SURGERY | Facility: CLINIC | Age: 85
End: 2020-02-21

## 2020-02-21 VITALS
OXYGEN SATURATION: 96 % | HEART RATE: 72 BPM | DIASTOLIC BLOOD PRESSURE: 48 MMHG | HEIGHT: 65 IN | RESPIRATION RATE: 16 BRPM | WEIGHT: 164.2 LBS | BODY MASS INDEX: 27.36 KG/M2 | TEMPERATURE: 98.5 F | SYSTOLIC BLOOD PRESSURE: 142 MMHG

## 2020-02-21 DIAGNOSIS — Z09 SURGICAL FOLLOWUP VISIT: Primary | ICD-10-CM

## 2020-02-21 DIAGNOSIS — N64.89 HEMATOMA OF BREAST: Primary | ICD-10-CM

## 2020-02-21 LAB
ANION GAP SERPL CALCULATED.3IONS-SCNC: 3 MMOL/L (ref 3–14)
BASOPHILS # BLD AUTO: 0 10E9/L (ref 0–0.2)
BASOPHILS NFR BLD AUTO: 0 %
BUN SERPL-MCNC: 23 MG/DL (ref 7–30)
CALCIUM SERPL-MCNC: 9 MG/DL (ref 8.5–10.1)
CHLORIDE SERPL-SCNC: 103 MMOL/L (ref 94–109)
CO2 SERPL-SCNC: 29 MMOL/L (ref 20–32)
CREAT SERPL-MCNC: 0.88 MG/DL (ref 0.52–1.04)
DIFFERENTIAL METHOD BLD: ABNORMAL
ELLIPTOCYTES BLD QL SMEAR: SLIGHT
EOSINOPHIL # BLD AUTO: 0 10E9/L (ref 0–0.7)
EOSINOPHIL NFR BLD AUTO: 0 %
ERYTHROCYTE [DISTWIDTH] IN BLOOD BY AUTOMATED COUNT: 14.5 % (ref 10–15)
GFR SERPL CREATININE-BSD FRML MDRD: 59 ML/MIN/{1.73_M2}
GLUCOSE SERPL-MCNC: 116 MG/DL (ref 70–99)
HCT VFR BLD AUTO: 23.5 % (ref 35–47)
HGB BLD-MCNC: 7.7 G/DL (ref 11.7–15.7)
IMM GRANULOCYTES # BLD: 0 10E9/L (ref 0–0.4)
IMM GRANULOCYTES NFR BLD: 0.5 %
INR PPP: 2.38 (ref 0.86–1.14)
LACTATE BLD-SCNC: 0.8 MMOL/L (ref 0.7–2)
LYMPHOCYTES # BLD AUTO: 0.5 10E9/L (ref 0.8–5.3)
LYMPHOCYTES NFR BLD AUTO: 24.1 %
MCH RBC QN AUTO: 38.5 PG (ref 26.5–33)
MCHC RBC AUTO-ENTMCNC: 32.8 G/DL (ref 31.5–36.5)
MCV RBC AUTO: 118 FL (ref 78–100)
MONOCYTES # BLD AUTO: 0.5 10E9/L (ref 0–1.3)
MONOCYTES NFR BLD AUTO: 27.3 %
NEUTROPHILS # BLD AUTO: 0.9 10E9/L (ref 1.6–8.3)
NEUTROPHILS NFR BLD AUTO: 47.1 %
NRBC # BLD AUTO: 0 10*3/UL
NRBC BLD AUTO-RTO: 1 /100
PLATELET # BLD AUTO: 70 10E9/L (ref 150–450)
PLATELET # BLD EST: ABNORMAL 10*3/UL
POTASSIUM SERPL-SCNC: 3.9 MMOL/L (ref 3.4–5.3)
RBC # BLD AUTO: 2 10E12/L (ref 3.8–5.2)
SODIUM SERPL-SCNC: 135 MMOL/L (ref 133–144)
VARIANT LYMPHS BLD QL SMEAR: PRESENT
WBC # BLD AUTO: 2 10E9/L (ref 4–11)

## 2020-02-21 PROCEDURE — 99213 OFFICE O/P EST LOW 20 MIN: CPT | Performed by: INTERNAL MEDICINE

## 2020-02-21 PROCEDURE — 80048 BASIC METABOLIC PNL TOTAL CA: CPT | Performed by: SURGERY

## 2020-02-21 PROCEDURE — 25800030 ZZH RX IP 258 OP 636: Performed by: INTERNAL MEDICINE

## 2020-02-21 PROCEDURE — 99207 ZZC CDG-CODE CATEGORY CHANGED: CPT | Performed by: INTERNAL MEDICINE

## 2020-02-21 PROCEDURE — 12000000 ZZH R&B MED SURG/OB

## 2020-02-21 PROCEDURE — 25000128 H RX IP 250 OP 636: Performed by: INTERNAL MEDICINE

## 2020-02-21 PROCEDURE — 25000132 ZZH RX MED GY IP 250 OP 250 PS 637: Mod: GY | Performed by: SURGERY

## 2020-02-21 PROCEDURE — 36415 COLL VENOUS BLD VENIPUNCTURE: CPT | Performed by: INTERNAL MEDICINE

## 2020-02-21 PROCEDURE — 85004 AUTOMATED DIFF WBC COUNT: CPT | Performed by: SURGERY

## 2020-02-21 PROCEDURE — 40000275 ZZH STATISTIC RCP TIME EA 10 MIN

## 2020-02-21 PROCEDURE — 99212 OFFICE O/P EST SF 10 MIN: CPT | Performed by: PHYSICIAN ASSISTANT

## 2020-02-21 PROCEDURE — 83605 ASSAY OF LACTIC ACID: CPT | Performed by: INTERNAL MEDICINE

## 2020-02-21 PROCEDURE — 85027 COMPLETE CBC AUTOMATED: CPT | Performed by: SURGERY

## 2020-02-21 PROCEDURE — 85610 PROTHROMBIN TIME: CPT | Performed by: SURGERY

## 2020-02-21 PROCEDURE — 93005 ELECTROCARDIOGRAM TRACING: CPT

## 2020-02-21 PROCEDURE — 36415 COLL VENOUS BLD VENIPUNCTURE: CPT | Performed by: SURGERY

## 2020-02-21 PROCEDURE — 80048 BASIC METABOLIC PNL TOTAL CA: CPT | Performed by: INTERNAL MEDICINE

## 2020-02-21 RX ORDER — WARFARIN SODIUM 6 MG/1
6 TABLET ORAL
Status: ON HOLD | COMMUNITY
End: 2020-02-24

## 2020-02-21 RX ORDER — ONDANSETRON 4 MG/1
4 TABLET, ORALLY DISINTEGRATING ORAL EVERY 6 HOURS PRN
Status: DISCONTINUED | OUTPATIENT
Start: 2020-02-21 | End: 2020-02-22

## 2020-02-21 RX ORDER — ATORVASTATIN CALCIUM 20 MG/1
20 TABLET, FILM COATED ORAL AT BEDTIME
Status: DISCONTINUED | OUTPATIENT
Start: 2020-02-21 | End: 2020-02-24 | Stop reason: HOSPADM

## 2020-02-21 RX ORDER — WARFARIN SODIUM 4 MG/1
4 TABLET ORAL DAILY
Status: ON HOLD | COMMUNITY
End: 2020-02-24

## 2020-02-21 RX ORDER — METOPROLOL SUCCINATE 25 MG/1
25 TABLET, EXTENDED RELEASE ORAL 2 TIMES DAILY
Status: DISCONTINUED | OUTPATIENT
Start: 2020-02-21 | End: 2020-02-24 | Stop reason: HOSPADM

## 2020-02-21 RX ORDER — NALOXONE HYDROCHLORIDE 0.4 MG/ML
.1-.4 INJECTION, SOLUTION INTRAMUSCULAR; INTRAVENOUS; SUBCUTANEOUS
Status: DISCONTINUED | OUTPATIENT
Start: 2020-02-21 | End: 2020-02-22

## 2020-02-21 RX ORDER — FUROSEMIDE 20 MG
20 TABLET ORAL DAILY
Status: DISCONTINUED | OUTPATIENT
Start: 2020-02-22 | End: 2020-02-21

## 2020-02-21 RX ORDER — SODIUM CHLORIDE 9 MG/ML
INJECTION, SOLUTION INTRAVENOUS CONTINUOUS
Status: DISCONTINUED | OUTPATIENT
Start: 2020-02-22 | End: 2020-02-22

## 2020-02-21 RX ORDER — ONDANSETRON 2 MG/ML
4 INJECTION INTRAMUSCULAR; INTRAVENOUS EVERY 6 HOURS PRN
Status: DISCONTINUED | OUTPATIENT
Start: 2020-02-21 | End: 2020-02-22

## 2020-02-21 RX ORDER — ACETAMINOPHEN 325 MG/1
650 TABLET ORAL EVERY 4 HOURS PRN
Status: DISCONTINUED | OUTPATIENT
Start: 2020-02-21 | End: 2020-02-22

## 2020-02-21 RX ADMIN — METOPROLOL SUCCINATE 25 MG: 25 TABLET, FILM COATED, EXTENDED RELEASE ORAL at 20:56

## 2020-02-21 RX ADMIN — ATORVASTATIN CALCIUM 20 MG: 20 TABLET, FILM COATED ORAL at 22:41

## 2020-02-21 RX ADMIN — ACETAMINOPHEN 650 MG: 325 TABLET, FILM COATED ORAL at 20:56

## 2020-02-21 RX ADMIN — PHYTONADIONE 2 MG: 2 INJECTION, EMULSION INTRAMUSCULAR; INTRAVENOUS; SUBCUTANEOUS at 22:06

## 2020-02-21 ASSESSMENT — ACTIVITIES OF DAILY LIVING (ADL): ADLS_ACUITY_SCORE: 17

## 2020-02-21 ASSESSMENT — MIFFLIN-ST. JEOR: SCORE: 1177.75

## 2020-02-21 NOTE — PROGRESS NOTES
2/21/2020    [facility] Note     Subjective:  Kayley Putnam is here for evaluation of right breast redness. She recently saw Dr. Pope in consultation after a right breast biopsy performed on 1/30 showed invasive ductal carcinoma at 8:00 position of her right breast.  Another stereotactic breast biopsy was performed on 2/11 to evaluate an additional area of calcifications at the 10:00 position of her right breast.  Patient reports extensive bruising after the first biopsy procedure, and new bruising with breast firmness immediately after the second biopsy performed on 2/11.  She presents today after noticing an area of redness at her right breast 2 days ago, which has become increasingly painful.  She has not taken her temperature, but she does report chills.  Of note, patient takes coumadin for Afib and her INR on the date of the most recent biopsy was 1.6.    Objective:  *seen with Dr. Pope  R breast:  Large area of healing ecchymosis of entire breast inferior to nipple, extending inferiorly to chest wall.  There is also a 5 cm area of pale erythema at the upper outer aspect of the breast which is warm and blanches.  + significant induration.      Assessment:  Right breast cancer   Right breast hematoma following stereotactic breast biopsy.    Plan:  Admit to hospital tonight for anticoagulation reversal  Plan for surgical debridement of right breast hematoma tomorrow.      Peace Borrego PA-C      Please route or send letter to:  Primary Care Provider (PCP)

## 2020-02-21 NOTE — TELEPHONE ENCOUNTER
Patient was seen for consultation by  on 2/4/20 for R breast invasive ductal carcinoma.  Seated stereotactic bx was ordered at  and completed on 2/11/20.   Patient is scheduled for lumpectomy with  on 3/4/20.  Of note patient is on daily warfarin.     Kyaley called today to report that she has a circular area of redness that is approximately 2 inches in diameter at the bx site.  States her entire breast is swollen and tender.    She denies drainage and is unsure if she has a fever.  States she does not feel well.      Discussed with Peace Borrego PA-C.  Patient to be seen in clinic by PA at 3 pm for breast check.    Patient is in agreement with this plan.  Appointment scheduled at 3:00 today with Peace Borrego PA-C.

## 2020-02-22 ENCOUNTER — ANESTHESIA (OUTPATIENT)
Dept: SURGERY | Facility: CLINIC | Age: 85
DRG: 908 | End: 2020-02-22
Payer: MEDICARE

## 2020-02-22 ENCOUNTER — ANESTHESIA EVENT (OUTPATIENT)
Dept: SURGERY | Facility: CLINIC | Age: 85
DRG: 908 | End: 2020-02-22
Payer: MEDICARE

## 2020-02-22 LAB
ABO + RH BLD: NORMAL
ABO + RH BLD: NORMAL
BASOPHILS # BLD AUTO: 0 10E9/L (ref 0–0.2)
BASOPHILS NFR BLD AUTO: 1 %
BLD GP AB SCN SERPL QL: NORMAL
BLD PROD TYP BPU: NORMAL
BLD PROD TYP BPU: NORMAL
BLD UNIT ID BPU: 0
BLOOD BANK CMNT PATIENT-IMP: NORMAL
BLOOD PRODUCT CODE: NORMAL
BPU ID: NORMAL
DACRYOCYTES BLD QL SMEAR: SLIGHT
DIFFERENTIAL METHOD BLD: ABNORMAL
EOSINOPHIL # BLD AUTO: 0 10E9/L (ref 0–0.7)
EOSINOPHIL NFR BLD AUTO: 0 %
ERYTHROCYTE [DISTWIDTH] IN BLOOD BY AUTOMATED COUNT: 14.2 % (ref 10–15)
ERYTHROCYTE [DISTWIDTH] IN BLOOD BY AUTOMATED COUNT: 14.6 % (ref 10–15)
GRAM STN SPEC: NORMAL
GRAM STN SPEC: NORMAL
HCT VFR BLD AUTO: 21.3 % (ref 35–47)
HCT VFR BLD AUTO: 23.3 % (ref 35–47)
HGB BLD-MCNC: 6.9 G/DL (ref 11.7–15.7)
HGB BLD-MCNC: 7.6 G/DL (ref 11.7–15.7)
INR PPP: 1.52 (ref 0.86–1.14)
INR PPP: 1.71 (ref 0.86–1.14)
LYMPHOCYTES # BLD AUTO: 0.7 10E9/L (ref 0.8–5.3)
LYMPHOCYTES NFR BLD AUTO: 44 %
Lab: NORMAL
MACROCYTES BLD QL SMEAR: PRESENT
MCH RBC QN AUTO: 38.5 PG (ref 26.5–33)
MCH RBC QN AUTO: 38.8 PG (ref 26.5–33)
MCHC RBC AUTO-ENTMCNC: 32.4 G/DL (ref 31.5–36.5)
MCHC RBC AUTO-ENTMCNC: 32.6 G/DL (ref 31.5–36.5)
MCV RBC AUTO: 119 FL (ref 78–100)
MCV RBC AUTO: 119 FL (ref 78–100)
METAMYELOCYTES # BLD: 0 10E9/L
METAMYELOCYTES NFR BLD MANUAL: 1 %
MONOCYTES # BLD AUTO: 0.4 10E9/L (ref 0–1.3)
MONOCYTES NFR BLD AUTO: 28 %
NEUTROPHILS # BLD AUTO: 0.4 10E9/L (ref 1.6–8.3)
NEUTROPHILS NFR BLD AUTO: 26 %
NUM BPU REQUESTED: 1
OVALOCYTES BLD QL SMEAR: SLIGHT
PLATELET # BLD AUTO: 52 10E9/L (ref 150–450)
PLATELET # BLD AUTO: 65 10E9/L (ref 150–450)
PLATELET # BLD EST: ABNORMAL 10*3/UL
POIKILOCYTOSIS BLD QL SMEAR: SLIGHT
RBC # BLD AUTO: 1.79 10E12/L (ref 3.8–5.2)
RBC # BLD AUTO: 1.96 10E12/L (ref 3.8–5.2)
SPECIMEN EXP DATE BLD: NORMAL
SPECIMEN SOURCE: NORMAL
TRANSFUSION STATUS PATIENT QL: NORMAL
TRANSFUSION STATUS PATIENT QL: NORMAL
VARIANT LYMPHS BLD QL SMEAR: PRESENT
WBC # BLD AUTO: 0.9 10E9/L (ref 4–11)
WBC # BLD AUTO: 1.5 10E9/L (ref 4–11)

## 2020-02-22 PROCEDURE — 25000125 ZZHC RX 250: Performed by: NURSE ANESTHETIST, CERTIFIED REGISTERED

## 2020-02-22 PROCEDURE — 71000013 ZZH RECOVERY PHASE 1 LEVEL 1 EA ADDTL HR: Performed by: SURGERY

## 2020-02-22 PROCEDURE — 87205 SMEAR GRAM STAIN: CPT | Performed by: SURGERY

## 2020-02-22 PROCEDURE — 25000128 H RX IP 250 OP 636: Performed by: SURGERY

## 2020-02-22 PROCEDURE — 99232 SBSQ HOSP IP/OBS MODERATE 35: CPT | Performed by: INTERNAL MEDICINE

## 2020-02-22 PROCEDURE — 71000012 ZZH RECOVERY PHASE 1 LEVEL 1 FIRST HR: Performed by: SURGERY

## 2020-02-22 PROCEDURE — 25000132 ZZH RX MED GY IP 250 OP 250 PS 637: Mod: GY | Performed by: SURGERY

## 2020-02-22 PROCEDURE — 25000125 ZZHC RX 250: Performed by: SURGERY

## 2020-02-22 PROCEDURE — 85610 PROTHROMBIN TIME: CPT | Performed by: INTERNAL MEDICINE

## 2020-02-22 PROCEDURE — 37000009 ZZH ANESTHESIA TECHNICAL FEE, EACH ADDTL 15 MIN: Performed by: SURGERY

## 2020-02-22 PROCEDURE — 36415 COLL VENOUS BLD VENIPUNCTURE: CPT | Performed by: INTERNAL MEDICINE

## 2020-02-22 PROCEDURE — 10140 I&D HMTMA SEROMA/FLUID COLLJ: CPT | Performed by: SURGERY

## 2020-02-22 PROCEDURE — 86923 COMPATIBILITY TEST ELECTRIC: CPT | Performed by: INTERNAL MEDICINE

## 2020-02-22 PROCEDURE — 25000128 H RX IP 250 OP 636: Performed by: ANESTHESIOLOGY

## 2020-02-22 PROCEDURE — 25800030 ZZH RX IP 258 OP 636: Performed by: SURGERY

## 2020-02-22 PROCEDURE — 87102 FUNGUS ISOLATION CULTURE: CPT | Performed by: SURGERY

## 2020-02-22 PROCEDURE — 25000128 H RX IP 250 OP 636: Performed by: INTERNAL MEDICINE

## 2020-02-22 PROCEDURE — 25800030 ZZH RX IP 258 OP 636: Performed by: NURSE ANESTHETIST, CERTIFIED REGISTERED

## 2020-02-22 PROCEDURE — P9016 RBC LEUKOCYTES REDUCED: HCPCS | Performed by: INTERNAL MEDICINE

## 2020-02-22 PROCEDURE — 86900 BLOOD TYPING SEROLOGIC ABO: CPT | Performed by: INTERNAL MEDICINE

## 2020-02-22 PROCEDURE — 40000306 ZZH STATISTIC PRE PROC ASSESS II: Performed by: SURGERY

## 2020-02-22 PROCEDURE — 12000000 ZZH R&B MED SURG/OB

## 2020-02-22 PROCEDURE — 37000008 ZZH ANESTHESIA TECHNICAL FEE, 1ST 30 MIN: Performed by: SURGERY

## 2020-02-22 PROCEDURE — 40000305 ZZH STATISTIC PRE PROC ASSESS I: Performed by: SURGERY

## 2020-02-22 PROCEDURE — 85025 COMPLETE CBC W/AUTO DIFF WBC: CPT | Performed by: INTERNAL MEDICINE

## 2020-02-22 PROCEDURE — 85610 PROTHROMBIN TIME: CPT | Performed by: ANESTHESIOLOGY

## 2020-02-22 PROCEDURE — 25000128 H RX IP 250 OP 636: Performed by: NURSE ANESTHETIST, CERTIFIED REGISTERED

## 2020-02-22 PROCEDURE — 36000050 ZZH SURGERY LEVEL 2 1ST 30 MIN: Performed by: SURGERY

## 2020-02-22 PROCEDURE — 25000132 ZZH RX MED GY IP 250 OP 250 PS 637: Mod: GY | Performed by: ANESTHESIOLOGY

## 2020-02-22 PROCEDURE — 86901 BLOOD TYPING SEROLOGIC RH(D): CPT | Performed by: INTERNAL MEDICINE

## 2020-02-22 PROCEDURE — 99207 ZZC CDG-MDM COMPONENT: MEETS LOW - DOWN CODED: CPT | Performed by: INTERNAL MEDICINE

## 2020-02-22 PROCEDURE — 0HCT0ZZ EXTIRPATION OF MATTER FROM RIGHT BREAST, OPEN APPROACH: ICD-10-PCS | Performed by: SURGERY

## 2020-02-22 PROCEDURE — 25800030 ZZH RX IP 258 OP 636: Performed by: ANESTHESIOLOGY

## 2020-02-22 PROCEDURE — 87070 CULTURE OTHR SPECIMN AEROBIC: CPT | Performed by: SURGERY

## 2020-02-22 PROCEDURE — 27210794 ZZH OR GENERAL SUPPLY STERILE: Performed by: SURGERY

## 2020-02-22 PROCEDURE — 85027 COMPLETE CBC AUTOMATED: CPT | Performed by: ANESTHESIOLOGY

## 2020-02-22 PROCEDURE — 36000052 ZZH SURGERY LEVEL 2 EA 15 ADDTL MIN: Performed by: SURGERY

## 2020-02-22 PROCEDURE — 86850 RBC ANTIBODY SCREEN: CPT | Performed by: INTERNAL MEDICINE

## 2020-02-22 RX ORDER — METOCLOPRAMIDE 5 MG/1
5 TABLET ORAL EVERY 6 HOURS PRN
Status: DISCONTINUED | OUTPATIENT
Start: 2020-02-22 | End: 2020-02-24 | Stop reason: HOSPADM

## 2020-02-22 RX ORDER — PROPOFOL 10 MG/ML
INJECTION, EMULSION INTRAVENOUS CONTINUOUS PRN
Status: DISCONTINUED | OUTPATIENT
Start: 2020-02-22 | End: 2020-02-22

## 2020-02-22 RX ORDER — ACETAMINOPHEN 500 MG
1000 TABLET ORAL ONCE
Status: COMPLETED | OUTPATIENT
Start: 2020-02-22 | End: 2020-02-22

## 2020-02-22 RX ORDER — HYDRALAZINE HYDROCHLORIDE 20 MG/ML
2.5-5 INJECTION INTRAMUSCULAR; INTRAVENOUS EVERY 10 MIN PRN
Status: DISCONTINUED | OUTPATIENT
Start: 2020-02-22 | End: 2020-02-22 | Stop reason: HOSPADM

## 2020-02-22 RX ORDER — NALOXONE HYDROCHLORIDE 0.4 MG/ML
.1-.4 INJECTION, SOLUTION INTRAMUSCULAR; INTRAVENOUS; SUBCUTANEOUS
Status: DISCONTINUED | OUTPATIENT
Start: 2020-02-22 | End: 2020-02-24 | Stop reason: HOSPADM

## 2020-02-22 RX ORDER — ONDANSETRON 4 MG/1
4 TABLET, ORALLY DISINTEGRATING ORAL EVERY 6 HOURS PRN
Status: DISCONTINUED | OUTPATIENT
Start: 2020-02-22 | End: 2020-02-24 | Stop reason: HOSPADM

## 2020-02-22 RX ORDER — OXYCODONE HYDROCHLORIDE 5 MG/1
5 TABLET ORAL
Status: DISCONTINUED | OUTPATIENT
Start: 2020-02-22 | End: 2020-02-24 | Stop reason: HOSPADM

## 2020-02-22 RX ORDER — SODIUM CHLORIDE, SODIUM LACTATE, POTASSIUM CHLORIDE, CALCIUM CHLORIDE 600; 310; 30; 20 MG/100ML; MG/100ML; MG/100ML; MG/100ML
INJECTION, SOLUTION INTRAVENOUS CONTINUOUS PRN
Status: DISCONTINUED | OUTPATIENT
Start: 2020-02-22 | End: 2020-02-22

## 2020-02-22 RX ORDER — LIDOCAINE HYDROCHLORIDE 10 MG/ML
INJECTION, SOLUTION INFILTRATION; PERINEURAL PRN
Status: DISCONTINUED | OUTPATIENT
Start: 2020-02-22 | End: 2020-02-22

## 2020-02-22 RX ORDER — FENTANYL CITRATE 50 UG/ML
INJECTION, SOLUTION INTRAMUSCULAR; INTRAVENOUS PRN
Status: DISCONTINUED | OUTPATIENT
Start: 2020-02-22 | End: 2020-02-22

## 2020-02-22 RX ORDER — NALOXONE HYDROCHLORIDE 0.4 MG/ML
.1-.4 INJECTION, SOLUTION INTRAMUSCULAR; INTRAVENOUS; SUBCUTANEOUS
Status: DISCONTINUED | OUTPATIENT
Start: 2020-02-22 | End: 2020-02-22

## 2020-02-22 RX ORDER — METOCLOPRAMIDE HYDROCHLORIDE 5 MG/ML
5 INJECTION INTRAMUSCULAR; INTRAVENOUS EVERY 6 HOURS PRN
Status: DISCONTINUED | OUTPATIENT
Start: 2020-02-22 | End: 2020-02-24 | Stop reason: HOSPADM

## 2020-02-22 RX ORDER — LABETALOL 20 MG/4 ML (5 MG/ML) INTRAVENOUS SYRINGE
10
Status: DISCONTINUED | OUTPATIENT
Start: 2020-02-22 | End: 2020-02-22 | Stop reason: HOSPADM

## 2020-02-22 RX ORDER — FENTANYL CITRATE 50 UG/ML
25-50 INJECTION, SOLUTION INTRAMUSCULAR; INTRAVENOUS
Status: DISCONTINUED | OUTPATIENT
Start: 2020-02-22 | End: 2020-02-22 | Stop reason: HOSPADM

## 2020-02-22 RX ORDER — HYDROMORPHONE HYDROCHLORIDE 1 MG/ML
0.2 INJECTION, SOLUTION INTRAMUSCULAR; INTRAVENOUS; SUBCUTANEOUS
Status: DISCONTINUED | OUTPATIENT
Start: 2020-02-22 | End: 2020-02-24 | Stop reason: HOSPADM

## 2020-02-22 RX ORDER — ONDANSETRON 4 MG/1
4 TABLET, ORALLY DISINTEGRATING ORAL EVERY 30 MIN PRN
Status: DISCONTINUED | OUTPATIENT
Start: 2020-02-22 | End: 2020-02-22 | Stop reason: HOSPADM

## 2020-02-22 RX ORDER — PROPOFOL 10 MG/ML
INJECTION, EMULSION INTRAVENOUS PRN
Status: DISCONTINUED | OUTPATIENT
Start: 2020-02-22 | End: 2020-02-22

## 2020-02-22 RX ORDER — DIMENHYDRINATE 50 MG/ML
25 INJECTION, SOLUTION INTRAMUSCULAR; INTRAVENOUS
Status: DISCONTINUED | OUTPATIENT
Start: 2020-02-22 | End: 2020-02-22 | Stop reason: HOSPADM

## 2020-02-22 RX ORDER — DEXAMETHASONE SODIUM PHOSPHATE 4 MG/ML
4 INJECTION, SOLUTION INTRA-ARTICULAR; INTRALESIONAL; INTRAMUSCULAR; INTRAVENOUS; SOFT TISSUE
Status: DISCONTINUED | OUTPATIENT
Start: 2020-02-22 | End: 2020-02-22 | Stop reason: HOSPADM

## 2020-02-22 RX ORDER — BUPIVACAINE HYDROCHLORIDE AND EPINEPHRINE 2.5; 5 MG/ML; UG/ML
INJECTION, SOLUTION EPIDURAL; INFILTRATION; INTRACAUDAL; PERINEURAL PRN
Status: DISCONTINUED | OUTPATIENT
Start: 2020-02-22 | End: 2020-02-22 | Stop reason: HOSPADM

## 2020-02-22 RX ORDER — GLYCOPYRROLATE 0.2 MG/ML
INJECTION, SOLUTION INTRAMUSCULAR; INTRAVENOUS PRN
Status: DISCONTINUED | OUTPATIENT
Start: 2020-02-22 | End: 2020-02-22

## 2020-02-22 RX ORDER — SODIUM CHLORIDE, SODIUM LACTATE, POTASSIUM CHLORIDE, CALCIUM CHLORIDE 600; 310; 30; 20 MG/100ML; MG/100ML; MG/100ML; MG/100ML
INJECTION, SOLUTION INTRAVENOUS CONTINUOUS
Status: DISCONTINUED | OUTPATIENT
Start: 2020-02-22 | End: 2020-02-22 | Stop reason: HOSPADM

## 2020-02-22 RX ORDER — DEXAMETHASONE SODIUM PHOSPHATE 4 MG/ML
INJECTION, SOLUTION INTRA-ARTICULAR; INTRALESIONAL; INTRAMUSCULAR; INTRAVENOUS; SOFT TISSUE PRN
Status: DISCONTINUED | OUTPATIENT
Start: 2020-02-22 | End: 2020-02-22

## 2020-02-22 RX ORDER — PROCHLORPERAZINE 25 MG
12.5 SUPPOSITORY, RECTAL RECTAL EVERY 12 HOURS PRN
Status: DISCONTINUED | OUTPATIENT
Start: 2020-02-22 | End: 2020-02-24 | Stop reason: HOSPADM

## 2020-02-22 RX ORDER — ACETAMINOPHEN 325 MG/1
650 TABLET ORAL EVERY 6 HOURS PRN
Status: DISCONTINUED | OUTPATIENT
Start: 2020-02-22 | End: 2020-02-24 | Stop reason: HOSPADM

## 2020-02-22 RX ORDER — LIDOCAINE 40 MG/G
CREAM TOPICAL
Status: DISCONTINUED | OUTPATIENT
Start: 2020-02-22 | End: 2020-02-24 | Stop reason: HOSPADM

## 2020-02-22 RX ORDER — LIDOCAINE 40 MG/G
CREAM TOPICAL
Status: DISCONTINUED | OUTPATIENT
Start: 2020-02-22 | End: 2020-02-22 | Stop reason: HOSPADM

## 2020-02-22 RX ORDER — ONDANSETRON 2 MG/ML
4 INJECTION INTRAMUSCULAR; INTRAVENOUS EVERY 6 HOURS PRN
Status: DISCONTINUED | OUTPATIENT
Start: 2020-02-22 | End: 2020-02-24 | Stop reason: HOSPADM

## 2020-02-22 RX ORDER — PROCHLORPERAZINE MALEATE 5 MG
5 TABLET ORAL EVERY 6 HOURS PRN
Status: DISCONTINUED | OUTPATIENT
Start: 2020-02-22 | End: 2020-02-24 | Stop reason: HOSPADM

## 2020-02-22 RX ORDER — BUPIVACAINE HYDROCHLORIDE AND EPINEPHRINE 2.5; 5 MG/ML; UG/ML
30 INJECTION, SOLUTION EPIDURAL; INFILTRATION; INTRACAUDAL; PERINEURAL ONCE
Status: DISCONTINUED | OUTPATIENT
Start: 2020-02-22 | End: 2020-02-23

## 2020-02-22 RX ORDER — SODIUM CHLORIDE 9 MG/ML
INJECTION, SOLUTION INTRAVENOUS CONTINUOUS
Status: DISCONTINUED | OUTPATIENT
Start: 2020-02-22 | End: 2020-02-24 | Stop reason: HOSPADM

## 2020-02-22 RX ORDER — ONDANSETRON 2 MG/ML
4 INJECTION INTRAMUSCULAR; INTRAVENOUS EVERY 30 MIN PRN
Status: DISCONTINUED | OUTPATIENT
Start: 2020-02-22 | End: 2020-02-22 | Stop reason: HOSPADM

## 2020-02-22 RX ORDER — HYDROMORPHONE HYDROCHLORIDE 1 MG/ML
.3-.5 INJECTION, SOLUTION INTRAMUSCULAR; INTRAVENOUS; SUBCUTANEOUS EVERY 5 MIN PRN
Status: DISCONTINUED | OUTPATIENT
Start: 2020-02-22 | End: 2020-02-22 | Stop reason: HOSPADM

## 2020-02-22 RX ADMIN — PROPOFOL 150 MG: 10 INJECTION, EMULSION INTRAVENOUS at 15:54

## 2020-02-22 RX ADMIN — Medication 100 MG: at 17:50

## 2020-02-22 RX ADMIN — FENTANYL CITRATE 25 MCG: 50 INJECTION, SOLUTION INTRAMUSCULAR; INTRAVENOUS at 18:26

## 2020-02-22 RX ADMIN — DEXAMETHASONE SODIUM PHOSPHATE 4 MG: 4 INJECTION, SOLUTION INTRA-ARTICULAR; INTRALESIONAL; INTRAMUSCULAR; INTRAVENOUS; SOFT TISSUE at 15:53

## 2020-02-22 RX ADMIN — SODIUM CHLORIDE, POTASSIUM CHLORIDE, SODIUM LACTATE AND CALCIUM CHLORIDE: 600; 310; 30; 20 INJECTION, SOLUTION INTRAVENOUS at 15:47

## 2020-02-22 RX ADMIN — SODIUM CHLORIDE: 9 INJECTION, SOLUTION INTRAVENOUS at 09:16

## 2020-02-22 RX ADMIN — SODIUM CHLORIDE, POTASSIUM CHLORIDE, SODIUM LACTATE AND CALCIUM CHLORIDE: 600; 310; 30; 20 INJECTION, SOLUTION INTRAVENOUS at 17:44

## 2020-02-22 RX ADMIN — SODIUM CHLORIDE: 9 INJECTION, SOLUTION INTRAVENOUS at 21:06

## 2020-02-22 RX ADMIN — FENTANYL CITRATE 100 MCG: 50 INJECTION, SOLUTION INTRAMUSCULAR; INTRAVENOUS at 15:54

## 2020-02-22 RX ADMIN — ACETAMINOPHEN 1000 MG: 500 TABLET ORAL at 20:00

## 2020-02-22 RX ADMIN — SODIUM CHLORIDE: 9 INJECTION, SOLUTION INTRAVENOUS at 00:08

## 2020-02-22 RX ADMIN — LIDOCAINE HYDROCHLORIDE 50 MG: 10 INJECTION, SOLUTION INFILTRATION; PERINEURAL at 15:54

## 2020-02-22 RX ADMIN — LABETALOL 20 MG/4 ML (5 MG/ML) INTRAVENOUS SYRINGE 10 MG: at 19:50

## 2020-02-22 RX ADMIN — PROPOFOL 150 MG: 10 INJECTION, EMULSION INTRAVENOUS at 17:50

## 2020-02-22 RX ADMIN — PROCHLORPERAZINE EDISYLATE 5 MG: 5 INJECTION INTRAMUSCULAR; INTRAVENOUS at 21:05

## 2020-02-22 RX ADMIN — ONDANSETRON 4 MG: 2 INJECTION INTRAMUSCULAR; INTRAVENOUS at 15:59

## 2020-02-22 RX ADMIN — ONDANSETRON 4 MG: 2 INJECTION INTRAMUSCULAR; INTRAVENOUS at 17:52

## 2020-02-22 RX ADMIN — GLYCOPYRROLATE 0.2 MG: 0.2 INJECTION, SOLUTION INTRAMUSCULAR; INTRAVENOUS at 15:54

## 2020-02-22 RX ADMIN — FENTANYL CITRATE 50 MCG: 50 INJECTION, SOLUTION INTRAMUSCULAR; INTRAVENOUS at 17:52

## 2020-02-22 RX ADMIN — FENTANYL CITRATE 50 MCG: 50 INJECTION, SOLUTION INTRAMUSCULAR; INTRAVENOUS at 16:07

## 2020-02-22 RX ADMIN — METOPROLOL SUCCINATE 25 MG: 25 TABLET, FILM COATED, EXTENDED RELEASE ORAL at 08:14

## 2020-02-22 RX ADMIN — FENTANYL CITRATE 50 MCG: 50 INJECTION, SOLUTION INTRAMUSCULAR; INTRAVENOUS at 16:14

## 2020-02-22 RX ADMIN — PROPOFOL 30 MCG/KG/MIN: 10 INJECTION, EMULSION INTRAVENOUS at 16:00

## 2020-02-22 ASSESSMENT — ENCOUNTER SYMPTOMS
DYSRHYTHMIAS: 1
DYSRHYTHMIAS: 1

## 2020-02-22 ASSESSMENT — ACTIVITIES OF DAILY LIVING (ADL)
ADLS_ACUITY_SCORE: 17

## 2020-02-22 NOTE — PROVIDER NOTIFICATION
Page to MD regarding INR at 1.71- procedure this afternoon any further interventions?   Per Surgeon comfortable with this level    Page to MD WBC 1.5 and ANC 0.4- pt not on Chemo therapy. New diagnosis of Ductal carcinoma

## 2020-02-22 NOTE — CONSULTS
Hospitalist Consult Note  Name: Kayley Putnam    MRN: [id number]  YOB: 1933    Age: 86 year old  Date of admission: 2/21/2020  Primary care provider: Taj Gordillo    Reason for consult: INR reversal in anticipation of I&D by general surgery.  Consult requested by Dr. Pope.    Kayley Putnam is a 86 year old female with PMH including a-fib on coumadin, CAD, HTN, MDS and invasive ductal carcinoma of the breast who was admitted by the general surgery team for management of a right breast hematoma which occurred following a stereotactic breast biopsy on February 11.  She does take Coumadin for A. fib.  She was admitted today per chart review due to concern regarding a large area of ecchymosis of the entire right breast with some concern for some local ischemia.    The hospitalist service was consulted for anticoagulation reversal prior to anticipated surgery tomorrow involving incision and drainage.      She does have pancytopenia in the setting of MDS and INR is currently therapeutic at 2.38.  I have ordered 2 mg IV vitamin K which hopefully will reverse her INR adequately by sometime tomorrow.  Would monitor blood counts closely as noted below, might need to consider platelet transfusion pending surgical preference prior to invasive procedure.        Assessment and Plan:   1. Coumadin use in the setting of anticipated urgent surgery:   --INR has just returned at 2.38.  Will give 2 mg IV vitamin K then recheck INR in the morning.  Should see an effect within about 12 hours, possibly a bit longer.  --Hemoglobin 7.7, recheck in the morning.  Platelets 70.  --We will check a type and screen with a.m. labs.  Would certainly transfuse for hemoglobin of 7 or lower.  Defer transfusion threshold for platelets to surgical team.  At minimum would transfuse prior to surgery to keep platelet count greater than 50, but if this is considered to be a fairly invasive procedure could make the argument to  transfuse to get platelets greater than 100 particularly given her other comorbidities/anemia.    2.   Breast cancer, per chart review invasive ductal carcinoma based on pathology from 1/30/20: Planning on right lumpectomy I believe on March 4.  Already had a preop exam in the [facility].    3.   History of pancytopenia in the setting of MDS: Hemoglobin 7.7, platelet count 70, white count 2.0.  Will monitor while here.    4.   History of CAD, diastolic dysfunction, chronic lower extremity edema: Not currently on anti-platelets.  Okay to resume atorvastatin and metoprolol.  Given that she will be n.p.o. at midnight would hold her home Lasix.    5.   History of chronic A. Fib, essential hypertension: We will check an EKG as a baseline prior to anticipated surgery tomorrow.  Holding Coumadin as above, resume metoprolol.            History of Present Illness:  Kayley Putnam is a 86 year old female with PMH including a-fib on coumadin, CAD, HTN, MDS and invasive ductal carcinoma of the breast who was admitted by the general surgery team for management of a right breast hematoma which occurred following a stereotactic breast biopsy on February 11.  She does take Coumadin for A. fib.  She was admitted today per chart review due to concern regarding a large area of ecchymosis of the entire right breast with some concern for some local ischemia.    The hospitalist service was consulted for anticoagulation reversal prior to anticipated surgery tomorrow involving incision and drainage.  Given recent bleeding it appears to be felt by the surgical team that Coumadin probably should not be restarted prior to an anticipated breast cancer surgery on March 4.     Past Medical History:  Past Medical History:   Diagnosis Date     AF (atrial fibrillation) (H)      Atherosclerosis of aorta (H)      CAD (coronary artery disease)      HTN (hypertension)      Hyperlipidemia      Myelodysplastic syndrome (H)      Overweight and  obesity(278.0)      Past Surgical History:  No past surgical history on file.  Social History:  Social History     Tobacco Use     Smoking status: Never Smoker     Smokeless tobacco: Never Used   Substance Use Topics     Alcohol use: Yes     Alcohol/week: 0.0 standard drinks     Comment: occasional - wine     Social History     Social History Narrative     Not on file     Family History:  Family History   Problem Relation Age of Onset     Cerebrovascular Disease Father      Allergies:  Allergies   Allergen Reactions     Amlodipine      Codeine      Flecainide      Meperidine      Promethazine      Ceftin [Cefuroxime] Itching and Rash     Tetracycline Rash     Vancomycin Itching and Rash     Medications:  Medications Prior to Admission   Medication Sig Dispense Refill Last Dose     Acetaminophen (TYLENOL EXTRA STRENGTH PO) Take 500 mg by mouth At Bedtime    2/20/2020 at Unknown time     Ascorbic Acid (VITAMIN C) 500 MG CAPS Take 500 mg by mouth daily    2/21/2020 at Unknown time     atorvastatin (LIPITOR) 20 MG tablet Take 20 mg by mouth At Bedtime    2/20/2020 at Unknown time     calcium-vitamin D (CALCIUM 600 + D) 600-400 MG-UNIT per tablet Take 1 tablet by mouth 2 times daily   2/21/2020 at x1     darbepoetin john-polysorbate (ARANESP, ALBUMIN FREE,) 300 MCG/ML SOLN Inject Subcutaneous every 14 days Inject every 2 weeks    2/12/2020     FUROSEMIDE PO Take 20 mg by mouth daily   2/21/2020 at Unknown time     metoprolol (TOPROL-XL) 25 MG 24 hr tablet Take 25 mg by mouth 2 times daily   2/21/2020 at Unknown time     warfarin ANTICOAGULANT (COUMADIN) 4 MG tablet Take 4 mg by mouth daily On T,Th, Sat, Sun   2/20/2020 at Unknown time     warfarin ANTICOAGULANT (COUMADIN) 6 MG tablet Take 6 mg by mouth On MWF   2/19/2020     amoxicillin (AMOXIL) 500 MG capsule Take 500 mg by mouth 4 tablets prior to dental appointments  2 Taking     Review of Systems:  A Comprehensive greater than 10 system review of systems was carried  out.  Pertinent positives and negatives are noted above.  Otherwise negative for contributory information.     Physical Exam:  Blood pressure (!) 167/40, pulse 67, temperature 99.4  F (37.4  C), temperature source Oral, resp. rate 24, SpO2 97 %.  Wt Readings from Last 1 Encounters:   02/21/20 74.5 kg (164 lb 3.2 oz)     Exam:  General: Alert, awake, no acute distress.  Well-nourished elderly woman.  HEENT: NC/AT, eyes anicteric, external occular movements intact, face symmetric.  Dentition WNL, MM moist.  Chest: Large area of induration in the 8 o'clock position on her breast.  Detailed breast exam deferred to primary surgical team, please see Dr. Pope's note for detailed physical exam involving large right breast ecchymoses.  Cardiac: RRR, S1, S2.  2-3/6 systolic murmur.  Pulmonary: Normal chest rise, normal work of breathing.  Lungs CTA BL  Abdomen: soft, non-tender, non-distended.  Bowel Sounds Present.  No guarding.  Extremities: 1+ lower extremity edema.  No deformities.  Warm, well perfused.  Skin: no rashes or lesions noted.  Warm and Dry.  Neuro: No focal deficits noted.  Speech clear.  Coordination and strength grossly normal.  Psych: Appropriate affect.    Data:  EKG: Checking baseline EKG  Imaging:  No results found for this or any previous visit (from the past 48 hour(s)).  Labs:  Recent Labs   Lab 02/21/20 1954   WBC 2.0*   HGB 7.7*   HCT 23.5*   *   PLT 70*          Lab Results   Component Value Date     02/21/2020     03/16/2018     06/19/2014    Lab Results   Component Value Date    CHLORIDE 103 02/21/2020    CHLORIDE 98 03/16/2018    CHLORIDE 101 06/19/2014    Lab Results   Component Value Date    BUN PENDING 02/21/2020    BUN 18 03/16/2018    BUN 24 06/19/2014      Lab Results   Component Value Date    POTASSIUM 3.9 02/21/2020    POTASSIUM 3.8 03/16/2018    POTASSIUM 3.8 06/19/2014    Lab Results   Component Value Date    CO2 PENDING 02/21/2020    CO2 27 03/16/2018     CO2 26 04/08/2009    Lab Results   Component Value Date    CR PENDING 02/21/2020    CR 0.82 03/16/2018    CR 0.99 01/19/2015        Recent Labs   Lab 02/21/20 1954   INR 2.38*         Tor Paz MD  Hospitalist  [facility]

## 2020-02-22 NOTE — PROGRESS NOTES
"[facility]  General Surgery Progress Note           Assessment and Plan:   Assessment:   Anticoagulated, INR dropping  Right breast hematoma      Plan:   -planning on surgical evacuation of hematoma later today. Discussed drain placement and management         Interval History:   Pt reports breast is \" more red\"         Physical Exam:   Blood pressure (!) 146/42, pulse 64, temperature 98  F (36.7  C), temperature source Oral, resp. rate 24, SpO2 94 %.    I/O last 3 completed shifts:  In: 618.33 [I.V.:618.33]  Out: -     Chest / Breast:   Right breast mass/hematoma with overlying skin erythema/induration                 Data:     Recent Labs   Lab 02/22/20  0653 02/21/20 1954   WBC 1.5* 2.0*   HGB 7.6* 7.7*   HCT 23.3* 23.5*   * 118*   PLT 65* 70*     Recent Labs   Lab 02/22/20  0653 02/21/20 1954   INR 1.71* 2.38*         Avery Gloria MD     "

## 2020-02-22 NOTE — PLAN OF CARE
Patient alert and oriented x4. Up SBA with walker. C/o of discomfort in right breast, Tylenol given x1. Max temp 100.1 recheck 97.6. Triggered lactic protocol, resulted at 0.8. INR on admission 2.38, given dose of phytonadione. NPO at midnight, IV fluids running. Plan is to have surgery today. Discharge pending.

## 2020-02-22 NOTE — PROGRESS NOTES
[facility]    Hospitalist Progress Note  Name: Kayley Putnam    MRN: [id number]  Provider: Coby Kahn MD  Date of Service: 02/22/2020    Assessment & Plan   Summary of Stay: Kayley Putnam is a 86 year old female who was admitted on 2/21/2020 large area of ecchymosis involving right breast.  Patient with past medical history significant for A. fib on Coumadin, coronary artery disease, hypertension, MDS and invasive ductal carcinoma of breast status post right breast hematoma following a stereotactic breast biopsy on February 11.  Patient scheduled for incision and drainage and hematoma evacuation surgery scheduled for 2/22/2020    Her past medical history significant for MDS with INR on admission up to 2.38 received vitamin K to reverse INR for procedure today.    Assessment and Plan:     Coumadin use in the setting of anticipated urgent surgery:   --INR was up to 2.38.  Received 2 mg IV vitamin K then recheck INR in the morning.  Should see an effect within about 12 hours, possibly a bit longer.  --Hemoglobin 7.7, recheck in the morning.  Platelets 70.  INR down to 1.71  --Chronic anemia we will transfuse if hemoglobin less than 7.    --Platelets down to 65  -We will recheck CBC in a.m.     Breast cancer, per chart review invasive ductal carcinoma based on pathology from 1/30/20: Planning on right lumpectomy I believe on March 4.  Already had a preop exam in the [facility].     History of pancytopenia in the setting of MDS: Hemoglobin 7.7, platelet count 70, white count 2.0.  Will monitor while here.      History of CAD, diastolic dysfunction, chronic lower extremity edema: Not currently on anti-platelets.  Okay to resume atorvastatin and metoprolol.    --Home Lasix held we will monitor weights resume tomorrow if tolerating p.o.     5.   History of chronic A. Fib, essential hypertension: We will check an EKG as a baseline prior to anticipated surgery tomorrow.   - Holding Coumadin as above,    -resumed  metoprolol.               DVT Prophylaxis: Low platelet  Code Status: Prior    Disposition: Expected discharge in 1-2 days to prior living arrangement      Interval History   Assumed care reviewed chart.  Patient doing well pain is well controlled as long as she does not push or move.  Tender right breast.  Ecchymosis unchanged.  No shortness of breath.  Review of all other symptoms are negative.    -Data reviewed today: I reviewed all new labs and imaging reports over the last 24 hours. I personally reviewed no images or EKG's today.    Physical Exam   Temp: 98.9  F (37.2  C) Temp src: Oral BP: (!) 146/42 Pulse: 64 Heart Rate: 64 Resp: 24 SpO2: 94 % O2 Device: None (Room air)    There were no vitals filed for this visit.  Vital Signs with Ranges  Temp:  [97.6  F (36.4  C)-100.1  F (37.8  C)] 98.9  F (37.2  C)  Pulse:  [64-72] 64  Heart Rate:  [64-73] 64  Resp:  [16-24] 24  BP: (122-168)/(35-67) 146/42  SpO2:  [92 %-99 %] 94 %  I/O last 3 completed shifts:  In: 618.33 [I.V.:618.33]  Out: -       General: Alert, awake, no acute distress.  Well-nourished elderly woman.  HEENT: NC/AT, eyes anicteric, external occular movements intact, face symmetric.  Dentition WNL, MM moist.  Chest: Large area of induration in the 8 o'clock position on her breast.  Detailed breast exam deferred to primary surgical team, please see Dr. Pope's note for detailed physical exam involving large right breast ecchymoses.  Cardiac: RRR, S1, S2.  2-3/6 systolic murmur.  Pulmonary: Normal chest rise, normal work of breathing.  Lungs CTA BL  Abdomen: soft, non-tender, non-distended.  Bowel Sounds Present.  No guarding.  Extremities: 1+ lower extremity edema.  No deformities.  Warm, well perfused.  Skin: no rashes or lesions noted.  Warm and Dry.  Neuro: No focal deficits noted.  Speech clear.  Coordination and strength grossly normal.  Psych: Appropriate affect.    Medications     sodium chloride 100 mL/hr at 02/22/20 0916        atorvastatin  20 mg Oral At Bedtime     clindamycin (CLEOCIN) intermittent infusion  600 mg Intravenous Pre-Op/Pre-procedure x 1 dose     metoprolol succinate ER  25 mg Oral BID     sodium chloride (PF)  3 mL Intracatheter Q8H     Data     Recent Labs   Lab 02/22/20  0653 02/21/20 1954   WBC 1.5* 2.0*   HGB 7.6* 7.7*   HCT 23.3* 23.5*   * 118*   PLT 65* 70*     Recent Labs   Lab 02/21/20 1954      POTASSIUM 3.9   CHLORIDE 103   CO2 29   ANIONGAP 3   *   BUN 23   CR 0.88   GFRESTIMATED 59*   GFRESTBLACK 68   NATALIA 9.0     No results for input(s): CULT in the last 168 hours.  No results for input(s): NTBNPI, NTBNP in the last 168 hours.  No results for input(s): DD in the last 168 hours.  No results for input(s): AST, ALT, GGT, ALKPHOS, BILITOTAL, BILICONJ, BILIDIRECT, ADEOLA in the last 168 hours.    Invalid input(s): BILIRUBININDIRECT  Recent Labs   Lab 02/22/20  0653 02/21/20 1954   INR 1.71* 2.38*     No results for input(s): LACT in the last 168 hours.  No results for input(s): CHOL, HDL, LDL, TRIG, CHOLHDLRATIO in the last 168 hours.  No results for input(s): TSH in the last 168 hours.  No results for input(s): TROPONIN, TROPI, TROPR in the last 168 hours.    Invalid input(s): TROP, TROPONINIES  No results for input(s): COLOR, APPEARANCE, URINEGLC, URINEBILI, URINEKETONE, SG, UBLD, URINEPH, PROTEIN, UROBILINOGEN, NITRITE, LEUKEST, RBCU, WBCU in the last 168 hours.    No results found for this or any previous visit (from the past 24 hour(s)).

## 2020-02-22 NOTE — H&P
[facility]  Hospital H&P    Assessment/Plan:    Kayley Putnam is being admitted for management of a right breat hematoma which occurred following stereotactic breast biopsy on 2/11/20.  Since her biopsy, her breast has grown increasingly firm and uncomfortable and she has started to notice some discoloration of her skin, which is concerning for ischemia. As such, we will plan on operative evacuation of the hematoma once her INR is reversed.   - STAT INR and CBC now.   - Hospitalist consult for assistance with INR reversal and chronic medical conditions.   - Regular diet today, then NPO at midnight for surgery tomorrow.  - Especially given her thrombocytopenia, I believe the risk of resuming her warfarin between now (post-op) and her breast cancer surgery date on 3/4 outweighs the risk. I do not plan to discharge her on warfarin.     HPI:  Kayley Putnam is a 86 year old female with invasive ductal carcinoma of the right breast who has recently undergone breast biopsies on 1/30 and 2/11. She is anticoagulated on warfarin for atrial fibrillation and unfortunately developed hematomas of the biopsy sites. She also has myelodysplastic syndrome with chronically low platelets. The first hematoma was smaller, about 4-5 cm. However, the hematoma with the second biopsy site has grown to be very large at 8-10 cm and is causing significant discomfort. Her biopsy was on 2/11, but the patient did not reach out to us with these symptoms until today, when she noticed some discoloration of her skin. At the time of her biopsy, her INR was 1.6 and her platelets were 55.     Family History:  Family history of breast cancer: No  Family history of ovarian cancer:  No  Family history of colon cancer: No  Family history of prostate cancer: No     Past Medical History:  Atrial fibrillation  CAD  HTN  Hyperlipidemia  Myelodysplastic syndrome    Past Surgical History:  No past surgical history on file.     Social History:  ,  non-smoker, minimal alcohol use, daughter Chloe is often involved with her medical care.    ROS:  The 10 point review of systems is negative other than noted in the HPI and/or below.    PE:  Vitals: BP (!) 167/40 (BP Location: Left arm)   Pulse 67   Temp 99.4  F (37.4  C) (Oral)   Resp 24   SpO2 97%   BMI: There is no height or weight on file to calculate BMI.  General appearance: well-nourished, sitting comfortably, no apparent distress  Neck: Supple, without masses or lymphadenopathy   Respirations:  Unlabored  CV: Regular pulse  Extremities: Without edema  Neurologic: alert, speech is clear, moves all extremities with good strength  Psychiatric: Mood and affect are appropriate  Skin: Without lesions, rashes, or jaundice  Breast:    Substantial ecchymosis and firm swelling of the right breast. Palpable hematoma is approximately 8-10 cm. Ecchymosis extends inferior to the inframammary fold. The skin overlying the hemotoma is mildly reddened/dusky at the area of greatest tension.                Lali Pope MD

## 2020-02-22 NOTE — PLAN OF CARE
Pt VSS  Pain is tolerable to the Right breast, continues to be very swollen and taut, red and warm- no drainage  Much bruising to the lateral aspect of the right breast, midline and right flank from the previous biopsy   SBA with ambulation  Pt is eager to discharge to home, is hopeful to go home this evening after procedure- this was discussed with surgeon. Many variables to determine if this will be the safest plan.   Off the floor for I & D with drain placement

## 2020-02-22 NOTE — PROGRESS NOTES
Called Reports to PACU, pt sent to surgery via surgery staff on a gurney. Dtr updated on pt location.

## 2020-02-22 NOTE — PLAN OF CARE
Pt direct admit from general surgery for procedure tomorrow.  Plan for assessment and possible reversal of INR tonight with an I and D with drain placement tomorrow.The drain will stay in until the lumpectomy 3/4/20  Pain, redness, swelling, bruising noted to the right breast- no drainage  Independent with ambulation and ADL's

## 2020-02-22 NOTE — OP NOTE
General Surgery Brief Operative Note    Pre-operative diagnosis:  Right breast hematoma [T14.8XXA]   Post-operative diagnosis:  Same, 2 separate loculations   Procedure:  Incision, evacuation and drain placement   Surgeon: Avery Gloria MD   Assistant(s): NONE   Anesthesia: General    Estimated blood loss: 50 cc's of clot   Drains placed: None   Complications:  None   Findings:     Specimens:   ID Type Source Tests Collected by Time Destination   1 : right breast hematoma Fluid Breast, Right FLUID CULTURE AEROBIC BACTERIAL, FUNGUS CULTURE, GRAM STAIN Avery Gloria MD 2/22/2020  4:12 PM      Indications: This 86-year-old female recently underwent core biopsy for breast lesion.  She is on Coumadin for A. fib.  She is developed a substantial hematoma in her right breast and there is concern for skin ischemia.  She is therefore now brought to the operating room for evacuation of this hematoma.  She understands that this is not an operation intended to treat her malignancy.  We discussed placement of a drain to continue to evacuate blood from the surrounding tissues.  Both she and her family seems understand this and she is anxious to proceed with surgery.    Description: Patient brought the operating room placed upon the table and after induction of anesthetic the right breast is prepped and draped in a sterile manner.  A pause is performed.  An incision is made over the central portion of the large hard mass in the upper outer right breast.  This is extended down through it dermis indurated tissue into hematoma cavity.  This cavity is evacuated completely.  There appears to be residual mass-effect more centrally in the breast as well.  Making a small incision from the first drained hematoma cavity toward the central portion of the breast entered into a larger hematoma cavity.  This was also evacuated.  After irrigation and suctioning 15 Jj drain is placed through both cavities.  Closure was performed with  interrupted 2-0 PDS for superficial subcutaneous tissues and deep dermis followed by subcuticular Vicryl suture for skin.  Steri-Strips are applied and Marcaine is injected through the drain for postop pain relief.  She is then returned to the recovery room in excellent condition with all sponge and needle counts correct, having tolerated the procedure well.    Avery Gloria MD

## 2020-02-22 NOTE — ANESTHESIA PREPROCEDURE EVALUATION
Anesthesia Pre-Procedure Evaluation    Patient: Kayley Putnam   MRN: [id number] : 1933          Preoperative Diagnosis: Hematoma [T14.8XXA]    Procedure(s):  IRRIGATION AND DEBRIDEMENT, BREAST    Past Medical History:   Diagnosis Date     AF (atrial fibrillation) (H)      Atherosclerosis of aorta (H)      CAD (coronary artery disease)      HTN (hypertension)      Hyperlipidemia      Myelodysplastic syndrome (H)      Overweight and obesity(278.0)      No past surgical history on file.  Anesthesia Evaluation     . Pt has had prior anesthetic. Type: General           ROS/MED HX    ENT/Pulmonary:  - neg pulmonary ROS     Neurologic:  - neg neurologic ROS     Cardiovascular:     (+) hypertension--CAD, --. : . . . :. dysrhythmias a-fib, .       METS/Exercise Tolerance:     Hematologic: Comments: Myelodysplastic syndrome - neg hematologic  ROS       Musculoskeletal:  - neg musculoskeletal ROS       GI/Hepatic:  - neg GI/hepatic ROS       Renal/Genitourinary:  - ROS Renal section negative       Endo:  - neg endo ROS       Psychiatric:  - neg psychiatric ROS       Infectious Disease:  - neg infectious disease ROS       Malignancy:   (+)   Breast cancer, per chart review invasive ductal carcinoma based on pathology from 20: Planning on right lumpectomy        Other:    (+) No chance of pregnancy C-spine cleared: N/A, no H/O Chronic Pain,no other significant disability   - neg other ROS                      Physical Exam  Normal systems: cardiovascular, pulmonary and dental    Airway   Mallampati: II  TM distance: >3 FB  Neck ROM: full    Dental     Cardiovascular       Pulmonary             Lab Results   Component Value Date    WBC 1.5 (L) 2020    HGB 7.6 (L) 2020    HCT 23.3 (L) 2020    PLT 65 (L) 2020     2020    POTASSIUM 3.9 2020    CHLORIDE 103 2020    CO2 29 2020    BUN 23 2020    CR 0.88 2020     (H) 2020    NATALIA 9.0 2020  "   ALT 14 06/19/2014    AST 13 01/19/2015    ALKPHOS 76 01/19/2015    BILITOTAL 0.7 01/19/2015    PTT 33 04/06/2009    INR 1.71 (H) 02/22/2020       Preop Vitals  BP Readings from Last 3 Encounters:   02/22/20 (!) 163/78   02/21/20 (!) 142/48   02/04/20 112/52    Pulse Readings from Last 3 Encounters:   02/22/20 64   02/21/20 72   02/04/20 58      Resp Readings from Last 3 Encounters:   02/22/20 (!) 165   02/21/20 16   02/04/20 16    SpO2 Readings from Last 3 Encounters:   02/22/20 98%   02/21/20 96%   02/04/20 98%      Temp Readings from Last 1 Encounters:   02/22/20 98.9  F (37.2  C) (Oral)    Ht Readings from Last 1 Encounters:   02/21/20 1.638 m (5' 4.5\")      Wt Readings from Last 1 Encounters:   02/21/20 74.5 kg (164 lb 3.2 oz)    Estimated body mass index is 27.75 kg/m  as calculated from the following:    Height as of an earlier encounter on 2/21/20: 1.638 m (5' 4.5\").    Weight as of an earlier encounter on 2/21/20: 74.5 kg (164 lb 3.2 oz).       Anesthesia Plan      History & Physical Review  History and physical reviewed and following examination; no interval change.    ASA Status:  3 .    NPO Status:  > 8 hours    Plan for General and LMA with Intravenous induction. Maintenance will be Balanced.    PONV prophylaxis:  Dexamethasone or Solumedrol and Ondansetron (or other 5HT-3)       Postoperative Care  Postoperative pain management:  IV analgesics.      Consents  Anesthetic plan, risks, benefits and alternatives discussed with:  Patient.  Use of blood products discussed: Yes.   Use of blood products discussed with Patient.  Consented to blood products.  .                 Timi Lobo MD                    .  "

## 2020-02-22 NOTE — ANESTHESIA PREPROCEDURE EVALUATION
Anesthesia Pre-Procedure Evaluation    Patient: Kayley Putnam   MRN: [id number] : 1933          Preoperative Diagnosis: Hematoma [T14.8XXA]    Procedure(s):  IRRIGATION AND DEBRIDEMENT, BREAST    Past Medical History:   Diagnosis Date     AF (atrial fibrillation) (H)      Atherosclerosis of aorta (H)      CAD (coronary artery disease)      HTN (hypertension)      Hyperlipidemia      Myelodysplastic syndrome (H)      Overweight and obesity(278.0)      No past surgical history on file.  Anesthesia Evaluation     . Pt has had prior anesthetic. Type: General           ROS/MED HX    ENT/Pulmonary:  - neg pulmonary ROS    (-) asthma   Neurologic:  - neg neurologic ROS     Cardiovascular:     (+) Dyslipidemia, hypertension-Peripheral Vascular Disease-- Other, CAD, --. : . . . :. dysrhythmias a-fib, .       METS/Exercise Tolerance:     Hematologic: Comments: Myelodysplastic syndrome    (+) Anemia ( 7.3), Other Hematologic Disorder-MDS, pancytopenic      Musculoskeletal:   (+) arthritis,  -       GI/Hepatic:  - neg GI/hepatic ROS       Renal/Genitourinary:  - ROS Renal section negative       Endo:  - neg endo ROS       Psychiatric:  - neg psychiatric ROS       Infectious Disease:  - neg infectious disease ROS       Malignancy:   (+)   Breast cancer, per chart review invasive ductal carcinoma based on pathology from 20: Planning on right lumpectomy        Other:    (+) No chance of pregnancy C-spine cleared: N/A, no H/O Chronic Pain,no other significant disability   - neg other ROS                        Physical Exam  Normal systems: cardiovascular, pulmonary and dental    Airway   Mallampati: II  TM distance: >3 FB  Neck ROM: full    Dental     Cardiovascular   Rhythm and rate: regular and normal  (+) murmur       Pulmonary             Lab Results   Component Value Date    WBC 1.5 (L) 2020    HGB 7.6 (L) 2020    HCT 23.3 (L) 2020    PLT 65 (L) 2020     2020    POTASSIUM 3.9  "02/21/2020    CHLORIDE 103 02/21/2020    CO2 29 02/21/2020    BUN 23 02/21/2020    CR 0.88 02/21/2020     (H) 02/21/2020    NATALIA 9.0 02/21/2020    ALT 14 06/19/2014    AST 13 01/19/2015    ALKPHOS 76 01/19/2015    BILITOTAL 0.7 01/19/2015    PTT 33 04/06/2009    INR 1.71 (H) 02/22/2020       Preop Vitals  BP Readings from Last 3 Encounters:   02/22/20 (!) 153/56   02/21/20 (!) 142/48   02/04/20 112/52    Pulse Readings from Last 3 Encounters:   02/22/20 68   02/21/20 72   02/04/20 58      Resp Readings from Last 3 Encounters:   02/22/20 8   02/21/20 16   02/04/20 16    SpO2 Readings from Last 3 Encounters:   02/22/20 96%   02/21/20 96%   02/04/20 98%      Temp Readings from Last 1 Encounters:   02/22/20 98.3  F (36.8  C) (Temporal)    Ht Readings from Last 1 Encounters:   02/21/20 1.638 m (5' 4.5\")      Wt Readings from Last 1 Encounters:   02/21/20 74.5 kg (164 lb 3.2 oz)    Estimated body mass index is 27.75 kg/m  as calculated from the following:    Height as of 2/21/20: 1.638 m (5' 4.5\").    Weight as of 2/21/20: 74.5 kg (164 lb 3.2 oz).       Anesthesia Plan      History & Physical Review  History and physical reviewed and following examination; no interval change.    ASA Status:  3 emergent.    NPO Status:  > 8 hours (has not had anything to eat or drink in PACU)    Plan for General, ETT and RSI (Nauseated) with Intravenous induction. Maintenance will be Balanced.    PONV prophylaxis:  Dexamethasone or Solumedrol and Ondansetron (or other 5HT-3)       Postoperative Care  Postoperative pain management:  IV analgesics.      Consents  Anesthetic plan, risks, benefits and alternatives discussed with:  Patient.  Use of blood products discussed: Yes.   Use of blood products discussed with Patient.  Consented to blood products.  .                   Aly Russo MD                    .  "

## 2020-02-22 NOTE — PROGRESS NOTES
Respiratory Therapy  An EKG was completed and placed in the patient's chart. No complications noted during the procedure.    Александр Ashraf, RT  2/21/2020 at 11:18 PM

## 2020-02-22 NOTE — PHARMACY-ADMISSION MEDICATION HISTORY
Admission medication history interview status for this patient is complete. See [facility] admission navigator for allergy information, prior to admission medications and immunization status.     Medication history interview source(s):Patient  Medication history resources (including written lists, pill bottles, clinic record):None  Primary pharmacy:-    Changes made to PTA medication list:  Added: -  Deleted: -  Changed: warfarin    Actions taken by pharmacist (provider contacted, etc):None     Additional medication history information:None    Medication reconciliation/reorder completed by provider prior to medication history?  Yes     Do you take OTC medications (eg tylenol, ibuprofen, fish oil, eye/ear drops, etc)? Yes     For patients on insulin therapy: No  Lantus/levemir/NPH/toujeo/tresiba/Mix 70/30 dose:  No  Sliding scale Novolog: No  If Yes, do you have a baseline novolog pre-meal dose:  -  units with meals  Patients eat three meals a day: NA  How many episodes of hypoglycemia do you have per week: -  How many missed doses do you have per week: -  How many times do you check your blood glucose per day:  -  Do you have a Continuous glucose monitor (CGM) : No (remind pt that not approved for hospital use)  Any Barriers to therapy - Be specific :  No  (cost of medications, comfortable with giving injections (if applicable), comfortable and confident with current diabetes regimen)      Prior to Admission medications    Medication Sig Last Dose Taking? Auth Provider   Acetaminophen (TYLENOL EXTRA STRENGTH PO) Take 500 mg by mouth At Bedtime  2/20/2020 at Unknown time Yes Reported, Patient   Ascorbic Acid (VITAMIN C) 500 MG CAPS Take 500 mg by mouth daily  2/21/2020 at Unknown time Yes Reported, Patient   atorvastatin (LIPITOR) 20 MG tablet Take 20 mg by mouth At Bedtime  2/20/2020 at Unknown time Yes Reported, Patient   calcium-vitamin D (CALCIUM 600 + D) 600-400 MG-UNIT per tablet Take 1 tablet by mouth 2 times daily  2/21/2020 at x1 Yes Reported, Patient   darbepoetin john-polysorbate (ARANESP, ALBUMIN FREE,) 300 MCG/ML SOLN Inject Subcutaneous every 14 days Inject every 2 weeks  2/12/2020 Yes Reported, Patient   FUROSEMIDE PO Take 20 mg by mouth daily 2/21/2020 at Unknown time Yes Reported, Patient   metoprolol (TOPROL-XL) 25 MG 24 hr tablet Take 25 mg by mouth 2 times daily 2/21/2020 at Unknown time Yes Reported, Patient   warfarin ANTICOAGULANT (COUMADIN) 4 MG tablet Take 4 mg by mouth daily On T,Th, Sat, Sun 2/20/2020 at Unknown time Yes Unknown, Entered By History   warfarin ANTICOAGULANT (COUMADIN) 6 MG tablet Take 6 mg by mouth On MWF 2/19/2020 Yes Unknown, Entered By History   amoxicillin (AMOXIL) 500 MG capsule Take 500 mg by mouth 4 tablets prior to dental appointments   Reported, Patient

## 2020-02-22 NOTE — ANESTHESIA CARE TRANSFER NOTE
Patient: Kayley Putnam    Procedure(s):  IRRIGATION AND DEBRIDEMENT, BREAST    Diagnosis: Hematoma [T14.8XXA]  Diagnosis Additional Information: No value filed.    Anesthesia Type:   General, LMA     Note:  Airway :Face Mask  Patient transferred to:PACU  Comments: VSS. Spontaneously breathing O2 per open face mask.  Report given to RN.Handoff Report: Identifed the Patient, Identified the Reponsible Provider, Reviewed the pertinent medical history, Discussed the surgical course, Reviewed Intra-OP anesthesia mangement and issues during anesthesia, Set expectations for post-procedure period and Allowed opportunity for questions and acknowledgement of understanding      Vitals: (Last set prior to Anesthesia Care Transfer)    CRNA VITALS  2/22/2020 1558 - 2/22/2020 1633      2/22/2020             NIBP:  (!) 86/55    Pulse:  59    NIBP Mean:  67    SpO2:  99 %    Resp Rate (observed):  8                Electronically Signed By: SUPRIYA Forrester CRNA  February 22, 2020  4:33 PM

## 2020-02-23 LAB
ANISOCYTOSIS BLD QL SMEAR: SLIGHT
BASOPHILS # BLD AUTO: 0 10E9/L (ref 0–0.2)
BASOPHILS NFR BLD AUTO: 0 %
DIFFERENTIAL METHOD BLD: ABNORMAL
EOSINOPHIL # BLD AUTO: 0 10E9/L (ref 0–0.7)
EOSINOPHIL NFR BLD AUTO: 0 %
ERYTHROCYTE [DISTWIDTH] IN BLOOD BY AUTOMATED COUNT: 18 % (ref 10–15)
GLUCOSE SERPL-MCNC: 124 MG/DL (ref 70–99)
HCT VFR BLD AUTO: 24.7 % (ref 35–47)
HGB BLD-MCNC: 7.9 G/DL (ref 11.7–15.7)
INR PPP: 1.29 (ref 0.86–1.14)
LYMPHOCYTES # BLD AUTO: 0.2 10E9/L (ref 0.8–5.3)
LYMPHOCYTES NFR BLD AUTO: 18 %
MACROCYTES BLD QL SMEAR: PRESENT
MCH RBC QN AUTO: 35.9 PG (ref 26.5–33)
MCHC RBC AUTO-ENTMCNC: 32 G/DL (ref 31.5–36.5)
MCV RBC AUTO: 112 FL (ref 78–100)
MONOCYTES # BLD AUTO: 0.1 10E9/L (ref 0–1.3)
MONOCYTES NFR BLD AUTO: 10 %
NEUTROPHILS # BLD AUTO: 0.7 10E9/L (ref 1.6–8.3)
NEUTROPHILS NFR BLD AUTO: 72 %
OVALOCYTES BLD QL SMEAR: SLIGHT
PLATELET # BLD AUTO: 55 10E9/L (ref 150–450)
PLATELET # BLD EST: ABNORMAL 10*3/UL
RBC # BLD AUTO: 2.2 10E12/L (ref 3.8–5.2)
WBC # BLD AUTO: 1 10E9/L (ref 4–11)

## 2020-02-23 PROCEDURE — 36415 COLL VENOUS BLD VENIPUNCTURE: CPT | Performed by: SURGERY

## 2020-02-23 PROCEDURE — 25000132 ZZH RX MED GY IP 250 OP 250 PS 637: Mod: GY | Performed by: SURGERY

## 2020-02-23 PROCEDURE — 99233 SBSQ HOSP IP/OBS HIGH 50: CPT | Performed by: INTERNAL MEDICINE

## 2020-02-23 PROCEDURE — 12000000 ZZH R&B MED SURG/OB

## 2020-02-23 PROCEDURE — 85025 COMPLETE CBC W/AUTO DIFF WBC: CPT | Performed by: SURGERY

## 2020-02-23 PROCEDURE — 82947 ASSAY GLUCOSE BLOOD QUANT: CPT | Performed by: SURGERY

## 2020-02-23 PROCEDURE — 25000125 ZZHC RX 250: Performed by: SURGERY

## 2020-02-23 PROCEDURE — 25800030 ZZH RX IP 258 OP 636: Performed by: SURGERY

## 2020-02-23 PROCEDURE — 25000132 ZZH RX MED GY IP 250 OP 250 PS 637: Mod: GY | Performed by: INTERNAL MEDICINE

## 2020-02-23 PROCEDURE — 85610 PROTHROMBIN TIME: CPT | Performed by: SURGERY

## 2020-02-23 RX ORDER — FUROSEMIDE 20 MG
20 TABLET ORAL DAILY
Status: DISCONTINUED | OUTPATIENT
Start: 2020-02-23 | End: 2020-02-24 | Stop reason: HOSPADM

## 2020-02-23 RX ORDER — FUROSEMIDE 20 MG
20 TABLET ORAL DAILY
Status: DISCONTINUED | OUTPATIENT
Start: 2020-02-23 | End: 2020-02-23

## 2020-02-23 RX ADMIN — CLINDAMYCIN PHOSPHATE 600 MG: 600 INJECTION, SOLUTION INTRAVENOUS at 12:30

## 2020-02-23 RX ADMIN — METOPROLOL SUCCINATE 25 MG: 25 TABLET, FILM COATED, EXTENDED RELEASE ORAL at 20:24

## 2020-02-23 RX ADMIN — METOPROLOL SUCCINATE 25 MG: 25 TABLET, FILM COATED, EXTENDED RELEASE ORAL at 08:36

## 2020-02-23 RX ADMIN — CLINDAMYCIN PHOSPHATE 600 MG: 600 INJECTION, SOLUTION INTRAVENOUS at 00:03

## 2020-02-23 RX ADMIN — ATORVASTATIN CALCIUM 20 MG: 20 TABLET, FILM COATED ORAL at 20:24

## 2020-02-23 RX ADMIN — SODIUM CHLORIDE: 9 INJECTION, SOLUTION INTRAVENOUS at 06:17

## 2020-02-23 RX ADMIN — FUROSEMIDE 20 MG: 20 TABLET ORAL at 10:28

## 2020-02-23 RX ADMIN — ACETAMINOPHEN 650 MG: 325 TABLET, FILM COATED ORAL at 22:30

## 2020-02-23 NOTE — PROGRESS NOTES
[facility]  General Surgery Progress Note           Assessment and Plan:   Assessment:   -Right breast hematoma s/p 2/11 stereotactic breast biopsy  -Coumadin therapy d/t AFib  -POD#1 s/p Evacuation of right breast hematoma and drain placement, two procedures (had to return to OR d/t recurrence of bleeding in the recovery area)  -Acute blood loss anemia on chronic anemia; Hgb now 7.9  -2/22 RBC transfusion, 1unit  -Drain in place, serous/bloody output, minimal      Plan:   -Pain management: acetaminophen, additional medication available if needed  -Coumadin on hold  -Continue drain in place until output clears AND output less than 10cc/day.  Possible return to surgery office for removal if these criteria are met, otherwise may be left in place until scheduled lumpectomy on March 4.  -Drain care teaching planned.  -Diet: ADAT  -Appreciate Hospitalist involvement  -Discussed leaving ACE in place until Thursday and then removing and leaving off, may remove prior to this if discomfort.  Patient will keep sites dry until shower on day of planned lumpectomy; complete planned site cleansing with surgical soap on that date.  -OK from surgical standpoint to discharge home when stabilized, likely tomorrow.         Interval History:   Comfortable in bed but feeling very wiped out and weak.  Acetaminophen working well for pain.  Has been up to bathroom with her seated walker.  Notes she lives alone at her home.  Tolerating diet thus far.           Physical Exam:   Blood pressure (!) 141/45, pulse 59, temperature 96.8  F (36  C), temperature source Oral, resp. rate 16, SpO2 95 %.    I/O last 3 completed shifts:  In: 1698 [I.V.:1698]  Out: 1120 [Urine:600; Drains:470; Blood:50]    Right breast ecchymosis, incision dry, sutures in place.  No drainage on bandages.  ACE in place at chest.  Drain without leakage around exit point.  Output red serous, mild fibrin within tubing, scant output in bulb after am emptying.           Data:     Recent Labs   Lab Test 02/23/20  0642 02/22/20  1825 02/22/20  0653 02/21/20  1954  03/16/18  0702  01/19/15 06/19/14 07/17/13  0000   NATALIA  --   --   --  9.0  --  8.7  --  9.7 9.5 9.4   HGB 7.9* 6.9* 7.6* 7.7*   < > 9.3*   < >  --   --  10.2*    < > = values in this interval not displayed.        Charla Samaniego PA-C     As above, lives alone and felt unable to care for herself there this AM  Advancing diet, teaching ANDRESSA care  Feels better tonite, planning on discharge tomorrow  Discussed with hospitalist  Avery Gloria MD  2/23/2020 6:34 PM

## 2020-02-23 NOTE — PLAN OF CARE
Pt remains admitted for breast hematoma  A/Ox4  Pain in right breast reported, declined intervention  No nausea reported  ANDRESSA in place, minimal serosanginous output  Dressing CDI  On IV cleocin  Up SBA with walker  Advanced to regular diet, tolerating well  Discharge pending  Will continue to monitor

## 2020-02-23 NOTE — ANESTHESIA POSTPROCEDURE EVALUATION
Patient: Kayley Putnam    Procedure(s):  IRRIGATION AND DEBRIDEMENT, BREAST    Diagnosis:Hematoma [T14.8XXA]  Diagnosis Additional Information: No value filed.    Anesthesia Type:  General, LMA    Note:  Anesthesia Post Evaluation    Patient location during evaluation: PACU  Patient participation: Able to fully participate in evaluation  Level of consciousness: awake and alert  Pain management: adequate  Airway patency: patent  Cardiovascular status: acceptable  Respiratory status: acceptable  Hydration status: acceptable  PONV: controlled     Anesthetic complications: None          Last vitals:  Vitals:    02/23/20 0500 02/23/20 0803 02/23/20 1438   BP: (!) 132/38 (!) 141/45 (!) 116/37   Pulse: 59     Resp: 18 16 18   Temp: 95.7  F (35.4  C) 96.8  F (36  C) 96.9  F (36.1  C)   SpO2: 97% 95% 97%         Electronically Signed By: Timi Lobo MD  February 23, 2020  5:53 PM

## 2020-02-23 NOTE — PLAN OF CARE
Patient arrived to floor from PACU at 2015, very nauseated compazine given x1 with relief. Alert and oriented x4. Up Ax1 with walker in room. Bladder scanned for 355cc at 0100, patient up to void 300cc, and voiding adequately the rest of the shift. Started on cleocin. ANDRESSA in place with 10cc bright red bloody output. Denies pain. Chest ace wrapped. On 2L oxygen overnight, not requiring oxygen this AM. Refused capno, and continuous pulse ox this morning.  Started drain education with patient. Advanced to full liquid diet for breakfast. Discharge pending.

## 2020-02-23 NOTE — ANESTHESIA CARE TRANSFER NOTE
Patient: Kayley Putnam    Procedure(s):  IRRIGATION AND DEBRIDEMENT, BREAST    Diagnosis: * No pre-op diagnosis entered *  Diagnosis Additional Information: No value filed.    Anesthesia Type:   General, LMA     Note:  Airway :Face Mask  Patient transferred to:PACU  Comments: VSS.  Spontaneously breathing O2 per open face mask.   Report given to RN.Handoff Report: Identifed the Patient, Identified the Reponsible Provider, Reviewed the pertinent medical history, Discussed the surgical course, Reviewed Intra-OP anesthesia mangement and issues during anesthesia, Set expectations for post-procedure period and Allowed opportunity for questions and acknowledgement of understanding      Vitals: (Last set prior to Anesthesia Care Transfer)    CRNA VITALS  2/22/2020 1817 - 2/22/2020 1916      2/22/2020             Resp Rate (observed):  (!) 2                Electronically Signed By: SUPRIYA Forrester CRNA  February 22, 2020  7:16 PM

## 2020-02-23 NOTE — PROGRESS NOTES
[facility]    Hospitalist Progress Note  Name: Kayley Putnam    MRN: [id number]  Provider: Coby Kahn MD  Date of Service: 02/23/2020    Assessment & Plan   Summary of Stay: Kayley Putnam is a 86 year old female who was admitted on 2/21/2020 large area of ecchymosis involving right breast.  Patient with past medical history significant for A. fib on Coumadin, coronary artery disease, hypertension, MDS and invasive ductal carcinoma of breast status post right breast hematoma following a stereotactic breast biopsy on February 11.  S/p incision and drainage and hematoma evacuation surgery on 2/22/2020.  Patient received 1 unit of blood intraoperatively due to bleeding.  Had to return to the OR due to ongoing bleeding after initial I&D.    Her past medical history significant for MDS with INR on admission up to 2.38 received vitamin K to reverse INR for IND     Assessment and Plan:     Coumadin use in the setting of anticipated urgent surgery:   --INR was up to 2.38.  Received 2 mg IV vitamin K then recheck INR in the morning.  Should see an effect within about 12 hours, possibly a bit longer.  --Hemoglobin 7.9 today, recheck in the morning.  Platelets 55.  INR down to 1.29  --Chronic anemia   --Intraoperatively patient had significant bleeding required transfusion 1 unit of PRBCs  -Hemoglobin this morning stable  -We will recheck CBC in a.m.     Breast cancer, per chart review invasive ductal carcinoma based on pathology from 1/30/20: Planning on right lumpectomy on March 4.  Already had a preop exam in the [facility].     History of pancytopenia in the setting of MDS:   ON admiission Hemoglobin 7.7, platelet count 70, white count 2.0.  Will monitor while here.  -hgb 7.9 today, plts 55      History of CAD, diastolic dysfunction, chronic lower extremity edema: Not currently on anti-platelets.  Okay to resume atorvastatin and metoprolol.    --resumed lasixs today     5.   History of chronic A.  Fib, essential hypertension: We will check an EKG as a baseline prior to anticipated surgery tomorrow.   - Holding Coumadin as above,   -resumed  metoprolol.               DVT Prophylaxis: Low platelet  Code Status: Full Code    Disposition: Expected discharge in tomorrow to prior living arrangement      Interval History   Reviewed chart.  Patient underwent I&D 222.  Had to return to the OR due to excessive bleeding.  Received 1 unit of PRBCs.  Complains of discomfort.  No shortness of breath.  Review of all other symptoms are negative.    -Data reviewed today: I reviewed all new labs and imaging reports over the last 24 hours. I personally reviewed no images or EKG's today.    Physical Exam   Temp: 96.8  F (36  C) Temp src: Oral BP: (!) 141/45 Pulse: 59 Heart Rate: 70 Resp: 16 SpO2: 95 % O2 Device: None (Room air) Oxygen Delivery: 2 LPM  There were no vitals filed for this visit.  Vital Signs with Ranges  Temp:  [95.4  F (35.2  C)-98.9  F (37.2  C)] 96.8  F (36  C)  Pulse:  [59-77] 59  Heart Rate:  [58-75] 70  Resp:  [8-165] 16  BP: (132-179)/(38-85) 141/45  FiO2 (%):  [100 %] 100 %  SpO2:  [89 %-100 %] 95 %  I/O last 3 completed shifts:  In: 1698 [I.V.:1698]  Out: 1120 [Urine:600; Drains:470; Blood:50]      General: Alert, awake, no acute distress.  Well-nourished elderly woman.  HEENT: NC/AT, eyes anicteric, external occular movements intact, face symmetric.  Dentition WNL, MM moist.  Chest: Large area of induration in the 8 o'clock position on her breast.  Detailed breast exam deferred to primary surgical team, please see Dr. Pope's note for detailed physical exam involving large right breast ecchymoses.  Cardiac: RRR, S1, S2.  2-3/6 systolic murmur.  Pulmonary: Normal chest rise, normal work of breathing.  Lungs CTA BL  Abdomen: soft, non-tender, non-distended.  Bowel Sounds Present.  No guarding.  Extremities: 1+ lower extremity edema.  No deformities.  Warm, well perfused.  Skin: no rashes or lesions noted.   Warm and Dry.  Neuro: No focal deficits noted.  Speech clear.  Coordination and strength grossly normal.  Psych: Appropriate affect.    Medications     sodium chloride 100 mL/hr at 02/23/20 0617       atorvastatin  20 mg Oral At Bedtime     bupivacaine 0.25 % - EPINEPHrine 1:200,000 (PF)  30 mL Intradermal Once     clindamycin (CLEOCIN) intermittent infusion  600 mg Intravenous Q12H     metoprolol succinate ER  25 mg Oral BID     sodium chloride (PF)  3 mL Intracatheter Q8H     Data     Recent Labs   Lab 02/23/20 0642 02/22/20 1825 02/22/20  0653   WBC 1.0* 0.9* 1.5*   HGB 7.9* 6.9* 7.6*   HCT 24.7* 21.3* 23.3*   * 119* 119*   PLT 55* 52* 65*     Recent Labs   Lab 02/23/20 0642 02/21/20  1954   NA  --  135   POTASSIUM  --  3.9   CHLORIDE  --  103   CO2  --  29   ANIONGAP  --  3   * 116*   BUN  --  23   CR  --  0.88   GFRESTIMATED  --  59*   GFRESTBLACK  --  68   NATALIA  --  9.0     Recent Labs   Lab 02/22/20  1612   CULT PENDING  PENDING     No results for input(s): NTBNPI, NTBNP in the last 168 hours.  No results for input(s): DD in the last 168 hours.  No results for input(s): AST, ALT, GGT, ALKPHOS, BILITOTAL, BILICONJ, BILIDIRECT, ADEOLA in the last 168 hours.    Invalid input(s): BILIRUBININDIRECT  Recent Labs   Lab 02/23/20 0642 02/22/20 1825 02/22/20 0653   INR 1.29* 1.52* 1.71*     No results for input(s): LACT in the last 168 hours.  No results for input(s): CHOL, HDL, LDL, TRIG, CHOLHDLRATIO in the last 168 hours.  No results for input(s): TSH in the last 168 hours.  No results for input(s): TROPONIN, TROPI, TROPR in the last 168 hours.    Invalid input(s): TROP, TROPONINIES  No results for input(s): COLOR, APPEARANCE, URINEGLC, URINEBILI, URINEKETONE, SG, UBLD, URINEPH, PROTEIN, UROBILINOGEN, NITRITE, LEUKEST, RBCU, WBCU in the last 168 hours.    No results found for this or any previous visit (from the past 24 hour(s)).

## 2020-02-23 NOTE — OP NOTE
General Surgery Operative Note    Pre-operative diagnosis:  Right breast hematoma   Post-operative diagnosis:  Same   Procedure:  Evacuation of hematoma, cautery, placement of drain   Surgeon: Avery Gloria MD   Assistant(s): NONE   Anesthesia: General    Estimated blood loss: 25 cc's   Drains placed: Jj   Complications:  None   Findings:     Specimens: * No specimens in log *    Indications: This 86-year-old female underwent stereotactic core biopsy of a breast lesion and developed a large breast hematoma.  She was brought to the operating room for evacuation and drainage of this hematoma due to concern for skin necrosis.  She underwent this procedure without difficulty or apparent complication.  Hemostasis appeared excellent however, in the recovery room, she began putting significant amounts of blood out of her drain that was placed in her breast.  Examination showed the breast to be nicely decompressed by the drain however, due to significant drain output, it was recommended that she return to the operating room for reexploration of the surgical site and control of bleeding.  This was discussed both with the patient and her family who are here in attendance.  All their questions have been answered and they agree to proceed.    Description: Patient brought the operating room placed upon the table and after induction of anesthetic the right breast is prepped and draped in sterile manner.  A pause is performed.  The previous incision is opened, removing the previously placed subcutaneous and subcuticular sutures.  Clot was evacuated from the superficial cavity.  Inspection of this cavity showed small amount of bleeding from the lateral aspect of the deep portion of the incision.  This was cauterized.  We continued inspecting and reinspecting the superficial cavity and saw no other sites of bleeding.  We then proceeded through the connection between the superficial and deep cavity and evacuated clot from the  deep cavity.  We carefully evaluated this cavity and found some very minimal amounts of oozing that were completely controlled with cautery.  We then reinspected both the superficial and deep cavity 2 more times and found no evidence for bleeding.  Jj drain was replaced and we carefully checked the drain site and there was no evidence for bleeding there.  The drain was cut to the appropriate length and while we were preparing for closure, we continued watching and inspecting both the superficial and deep cavity for any source of bleeding but none was seen.  As were about to close, the patient coughed and moved and there was then some bleeding from the inferior corner of the incision within the breast parenchyma as it entered the superficial cavity.  This was controlled with cautery and suture.  We again continue to observe and irrigate the cavity and found no other sources of bleeding after several minutes of observation.  We then closed with interrupted nylon sutures to improve hemostasis at the skin.  Dressings and an Ace wrap were applied and she is returned to the recovery room in excellent condition with all sponge and needle counts correct, having tolerated the procedure well.    During the procedure, labs were sent off showing a stable hemoglobin and INR of 1.5.  Avery Gloria MD

## 2020-02-23 NOTE — ANESTHESIA POSTPROCEDURE EVALUATION
Patient: Kayley Putnam    Procedure(s):  IRRIGATION AND DEBRIDEMENT, BREAST    Diagnosis:* No pre-op diagnosis entered *  Diagnosis Additional Information: No value filed.    Anesthesia Type:  General, LMA    Note:  Anesthesia Post Evaluation    Patient location during evaluation: PACU  Patient participation: Able to fully participate in evaluation  Level of consciousness: awake and alert  Pain management: adequate  Airway patency: patent  Cardiovascular status: acceptable  Respiratory status: acceptable  Hydration status: acceptable  PONV: controlled             Last vitals:  Vitals:    02/22/20 1650 02/22/20 1655 02/22/20 1700   BP: (!) 153/60 (!) 145/52 (!) 153/56   Pulse: 70 62 68   Resp: 16 19 8   Temp:      SpO2: 91% (!) 89% 96%         Electronically Signed By: Aly Russo MD  February 22, 2020  7:03 PM

## 2020-02-24 VITALS
DIASTOLIC BLOOD PRESSURE: 35 MMHG | BODY MASS INDEX: 28.58 KG/M2 | SYSTOLIC BLOOD PRESSURE: 126 MMHG | WEIGHT: 169.1 LBS | OXYGEN SATURATION: 97 % | HEART RATE: 59 BPM | RESPIRATION RATE: 16 BRPM | TEMPERATURE: 96.1 F

## 2020-02-24 PROBLEM — N64.89 BREAST HEMATOMA: Status: ACTIVE | Noted: 2020-02-24

## 2020-02-24 LAB
ANION GAP SERPL CALCULATED.3IONS-SCNC: 5 MMOL/L (ref 3–14)
ANISOCYTOSIS BLD QL SMEAR: SLIGHT
BASOPHILS # BLD AUTO: 0 10E9/L (ref 0–0.2)
BASOPHILS NFR BLD AUTO: 0 %
BUN SERPL-MCNC: 22 MG/DL (ref 7–30)
CALCIUM SERPL-MCNC: 8.5 MG/DL (ref 8.5–10.1)
CHLORIDE SERPL-SCNC: 105 MMOL/L (ref 94–109)
CO2 SERPL-SCNC: 26 MMOL/L (ref 20–32)
CREAT SERPL-MCNC: 0.97 MG/DL (ref 0.52–1.04)
DIFFERENTIAL METHOD BLD: ABNORMAL
EOSINOPHIL # BLD AUTO: 0 10E9/L (ref 0–0.7)
EOSINOPHIL NFR BLD AUTO: 1 %
ERYTHROCYTE [DISTWIDTH] IN BLOOD BY AUTOMATED COUNT: 17.3 % (ref 10–15)
GFR SERPL CREATININE-BSD FRML MDRD: 52 ML/MIN/{1.73_M2}
GLUCOSE SERPL-MCNC: 97 MG/DL (ref 70–99)
HCT VFR BLD AUTO: 25.4 % (ref 35–47)
HGB BLD-MCNC: 8.1 G/DL (ref 11.7–15.7)
LYMPHOCYTES # BLD AUTO: 1.1 10E9/L (ref 0.8–5.3)
LYMPHOCYTES NFR BLD AUTO: 56 %
MACROCYTES BLD QL SMEAR: PRESENT
MCH RBC QN AUTO: 36.7 PG (ref 26.5–33)
MCHC RBC AUTO-ENTMCNC: 31.9 G/DL (ref 31.5–36.5)
MCV RBC AUTO: 115 FL (ref 78–100)
MONOCYTES # BLD AUTO: 0.3 10E9/L (ref 0–1.3)
MONOCYTES NFR BLD AUTO: 15 %
NEUTROPHILS # BLD AUTO: 0.5 10E9/L (ref 1.6–8.3)
NEUTROPHILS NFR BLD AUTO: 28 %
NRBC # BLD AUTO: 0 10*3/UL
NRBC BLD AUTO-RTO: 1 /100
OVALOCYTES BLD QL SMEAR: SLIGHT
PLATELET # BLD AUTO: 74 10E9/L (ref 150–450)
PLATELET # BLD EST: ABNORMAL 10*3/UL
POIKILOCYTOSIS BLD QL SMEAR: SLIGHT
POTASSIUM SERPL-SCNC: 4.2 MMOL/L (ref 3.4–5.3)
RBC # BLD AUTO: 2.21 10E12/L (ref 3.8–5.2)
SODIUM SERPL-SCNC: 136 MMOL/L (ref 133–144)
VARIANT LYMPHS BLD QL SMEAR: PRESENT
WBC # BLD AUTO: 1.9 10E9/L (ref 4–11)

## 2020-02-24 PROCEDURE — 25000132 ZZH RX MED GY IP 250 OP 250 PS 637: Mod: GY | Performed by: SURGERY

## 2020-02-24 PROCEDURE — 80048 BASIC METABOLIC PNL TOTAL CA: CPT | Performed by: INTERNAL MEDICINE

## 2020-02-24 PROCEDURE — 85025 COMPLETE CBC W/AUTO DIFF WBC: CPT | Performed by: INTERNAL MEDICINE

## 2020-02-24 PROCEDURE — 82947 ASSAY GLUCOSE BLOOD QUANT: CPT | Performed by: INTERNAL MEDICINE

## 2020-02-24 PROCEDURE — 25000132 ZZH RX MED GY IP 250 OP 250 PS 637: Mod: GY | Performed by: INTERNAL MEDICINE

## 2020-02-24 PROCEDURE — 99232 SBSQ HOSP IP/OBS MODERATE 35: CPT | Performed by: INTERNAL MEDICINE

## 2020-02-24 PROCEDURE — 36415 COLL VENOUS BLD VENIPUNCTURE: CPT | Performed by: INTERNAL MEDICINE

## 2020-02-24 RX ADMIN — METOPROLOL SUCCINATE 25 MG: 25 TABLET, FILM COATED, EXTENDED RELEASE ORAL at 09:01

## 2020-02-24 RX ADMIN — FUROSEMIDE 20 MG: 20 TABLET ORAL at 09:01

## 2020-02-24 ASSESSMENT — ACTIVITIES OF DAILY LIVING (ADL): ADLS_ACUITY_SCORE: 19

## 2020-02-24 NOTE — PROGRESS NOTES
[facility]    Hospitalist Progress Note  Name: Kayley Putnam    MRN: [id number]  Provider: Coby Kahn MD  Date of Service: 02/24/2020    Assessment & Plan   Summary of Stay: Kayley Putnam is a 86 year old female who was admitted on 2/21/2020 large area of ecchymosis involving right breast.  Patient with past medical history significant for A. fib on Coumadin, coronary artery disease, hypertension, MDS and invasive ductal carcinoma of breast status post right breast hematoma following a stereotactic breast biopsy on February 11.  S/p incision and drainage and hematoma evacuation surgery on 2/22/2020.  Patient received 1 unit of blood intraoperatively due to bleeding.  Had to return to the OR due to ongoing bleeding after initial I&D.    Her past medical history significant for MDS with INR on admission up to 2.38 received vitamin K to reverse INR for IND     Assessment and Plan:     Coumadin use in the setting of anticipated urgent surgery:   --INR was up to 2.38.  Received 2 mg IV vitamin K then recheck INR in the morning.  Should see an effect within about 12 hours, possibly a bit longer.  --Hemoglobin 8.1 today, recheck in the morning.  Platelets 74  INR down to 1.29  --Chronic anemia   --Intraoperatively patient had significant bleeding required transfusion 1 unit of PRBCs  -Hemoglobin this morning stable       Breast cancer, per chart review invasive ductal carcinoma based on pathology from 1/30/20: Planning on right lumpectomy on March 4.  Already had a preop exam in the [facility].     History of pancytopenia in the setting of MDS:   ON admiission Hemoglobin 7.7, platelet count 70, white count 2.0.  Will monitor while here.  -hgb 8.1 today, plts 74      History of CAD, diastolic dysfunction, chronic lower extremity edema: Not currently on anti-platelets.  Okay to resume atorvastatin and metoprolol.    --resumed lasixs today     5.   History of chronic A. Fib, essential hypertension: We  will check an EKG as a baseline prior to anticipated surgery tomorrow.   - Holding Coumadin as above,   -resumed  metoprolol.               DVT Prophylaxis: Low platelet  Code Status: Full Code    Disposition: Expected discharge in tomorrow to prior living arrangement      Interval History   Reviewed chart.  Review of all other symptoms are negative.    -Data reviewed today: I reviewed all new labs and imaging reports over the last 24 hours. I personally reviewed no images or EKG's today.    Physical Exam   Temp: 96.1  F (35.6  C) Temp src: Oral BP: (!) 126/35   Heart Rate: 69 Resp: 16 SpO2: 97 % O2 Device: None (Room air)    Vitals:    02/24/20 0700   Weight: 76.7 kg (169 lb 1.6 oz)     Vital Signs with Ranges  Temp:  [96.1  F (35.6  C)-97.4  F (36.3  C)] 96.1  F (35.6  C)  Heart Rate:  [58-71] 69  Resp:  [16-18] 16  BP: (116-130)/(32-39) 126/35  SpO2:  [95 %-99 %] 97 %  I/O last 3 completed shifts:  In: -   Out: 500 [Urine:500]      General: Alert, awake, no acute distress.  Well-nourished elderly woman.  HEENT: NC/AT, eyes anicteric, external occular movements intact, face symmetric.  Dentition WNL, MM moist.  Chest: Large area of induration in the 8 o'clock position on her breast.  Detailed breast exam deferred to primary surgical team, please see Dr. Pope's note for detailed physical exam involving large right breast ecchymoses.  Cardiac: RRR, S1, S2.  2-3/6 systolic murmur.  Pulmonary: Normal chest rise, normal work of breathing.  Lungs CTA BL  Abdomen: soft, non-tender, non-distended.  Bowel Sounds Present.  No guarding.  Extremities: 1+ lower extremity edema.  No deformities.  Warm, well perfused.  Skin: no rashes or lesions noted.  Warm and Dry.  Neuro: No focal deficits noted.  Speech clear.  Coordination and strength grossly normal.  Psych: Appropriate affect.    Medications     sodium chloride Stopped (02/24/20 1114)       atorvastatin  20 mg Oral At Bedtime     furosemide  20 mg Oral Daily      metoprolol succinate ER  25 mg Oral BID     sodium chloride (PF)  3 mL Intracatheter Q8H     Data     Recent Labs   Lab 02/24/20  0806 02/23/20  0642 02/22/20  1825   WBC 1.9* 1.0* 0.9*   HGB 8.1* 7.9* 6.9*   HCT 25.4* 24.7* 21.3*   * 112* 119*   PLT 74* 55* 52*     Recent Labs   Lab 02/24/20  0806 02/23/20  0642 02/21/20  1954     --  135   POTASSIUM 4.2  --  3.9   CHLORIDE 105  --  103   CO2 26  --  29   ANIONGAP 5  --  3   GLC 97 124* 116*   BUN 22  --  23   CR 0.97  --  0.88   GFRESTIMATED 52*  --  59*   GFRESTBLACK 61  --  68   NATALIA 8.5  --  9.0     Recent Labs   Lab 02/22/20  1612   CULT Culture negative monitoring continues  PENDING     No results for input(s): NTBNPI, NTBNP in the last 168 hours.  No results for input(s): DD in the last 168 hours.  No results for input(s): AST, ALT, GGT, ALKPHOS, BILITOTAL, BILICONJ, BILIDIRECT, ADEOLA in the last 168 hours.    Invalid input(s): BILIRUBININDIRECT  Recent Labs   Lab 02/23/20  0642 02/22/20  1825 02/22/20  0653   INR 1.29* 1.52* 1.71*     No results for input(s): LACT in the last 168 hours.  No results for input(s): CHOL, HDL, LDL, TRIG, CHOLHDLRATIO in the last 168 hours.  No results for input(s): TSH in the last 168 hours.  No results for input(s): TROPONIN, TROPI, TROPR in the last 168 hours.    Invalid input(s): TROP, TROPONINIES  No results for input(s): COLOR, APPEARANCE, URINEGLC, URINEBILI, URINEKETONE, SG, UBLD, URINEPH, PROTEIN, UROBILINOGEN, NITRITE, LEUKEST, RBCU, WBCU in the last 168 hours.    No results found for this or any previous visit (from the past 24 hour(s)).

## 2020-02-24 NOTE — PROGRESS NOTES
[facility]  General Surgery Progress Note           Assessment and Plan:   Assessment:   -Right breast hematoma s/p 2/11 stereotactic breast biopsy  -Coumadin therapy d/t AFib  -POD#1 s/p Evacuation of right breast hematoma and drain placement, two procedures (had to return to OR d/t recurrence of bleeding in the recovery area)  -Acute blood loss anemia on chronic anemia; Hgb now 7.9  -2/22 RBC transfusion, 1unit  -Drain in place, serous/bloody output, minimal      Plan:   -Pain management: acetaminophen, additional medication available if needed  -Coumadin on hold  -Continue drain in place until output clears AND output less than 10cc/day.  Possible return to surgery office Wed to have drain removed if continues to have scant output  -Drain care teaching planned.  -Diet: ADAT  -Appreciate Hospitalist involvement  -Discussed leaving ACE in place until Thursday and then removing and leaving off, may remove prior to this if discomfort.  Patient will keep sites dry until shower on day of planned lumpectomy; complete planned site cleansing with surgical soap on that date.  -OK from surgical standpoint to discharge home when stabilized, likely tomorrow.         Interval History:   Sitting at edge.  Feels better today. Ready to go home.  Feels comfortable taking care of drain.  Discharge instructions discussed.         Physical Exam:   Blood pressure (!) 126/35, pulse 59, temperature 96.1  F (35.6  C), temperature source Oral, resp. rate 16, weight 76.7 kg (169 lb 1.6 oz), SpO2 97 %.    I/O last 3 completed shifts:  In: -   Out: 500 [Urine:500]    Right breast ecchymosis, incision dry, sutures in place.  No drainage on bandages.  ACE in place at chest.  Drain without leakage around exit point.  Output red serous, scant output in bulb after am emptying.          Data:     Recent Labs   Lab Test 02/23/20  0642 02/22/20  1825 02/22/20  0653 02/21/20  1954  03/16/18  0702  01/19/15 06/19/14 07/17/13  0000   NATALIA   --   --   --  9.0  --  8.7  --  9.7 9.5 9.4   HGB 7.9* 6.9* 7.6* 7.7*   < > 9.3*   < >  --   --  10.2*    < > = values in this interval not displayed.        Surekha Flood PA-C

## 2020-02-24 NOTE — PLAN OF CARE
Patient alert and oriented x4. Up independent in room. C/o of discomfort in right breast. Tylenol given before HS. ANDRESSA in place draining minimal bright red bloody output. Ace wrap bandage CDI. Plan is to discharge home today with drain.

## 2020-02-24 NOTE — PROGRESS NOTES
Pt to D/C to home.  Pt provided with d/c instructions, including new medications, when medications were last given, and when to take them again.  Pt also informed to f/u with surgery this week if drain is less than 10/ml day otherwise  March 4th for planned surgery. Pt verbalized understanding of all d/c and f/u instructions.  All questions were answered at this time.  Copy of paperwork sent with pt.  No medication/scripts sent with pt.  Daughter to provide transport.  All personal belongings sent with pt.

## 2020-02-24 NOTE — PHARMACY-ANTICOAGULATION SERVICE
Clinical Pharmacy- Warfarin Discharge Note         Anticoagulation Dose History     Recent Dosing and Labs Latest Ref Rng & Units 3/15/2018 3/16/2018 2/11/2020 2/21/2020 2/22/2020 2/22/2020 2/23/2020    INR 0.86 - 1.14 2.57(H) 2.60(H) 1.60(H) 2.38(H) 1.71(H) 1.52(H) 1.29(H)          Patient Has surgery planned for first week of March and has drains in currently- per MD plan is to hold warfarin until after surgery

## 2020-02-25 LAB — INTERPRETATION ECG - MUSE: NORMAL

## 2020-02-25 NOTE — DISCHARGE SUMMARY
[facility]    Discharge Summary  Surgery    Date of Admission:  2/21/2020  Date of Discharge:  2/24/2020 12:10 PM  Discharging Provider: Surekha Flood PA-C  Discharge Summary Note completed by: Charla Samaniego PA-C on 2/25/2020  Date of Service: The patient was personally seen by Discharging Providers on the day of discharge.    Discharge Diagnoses   -Right breast hematoma s/p 2/11 stereotactic breast biopsy  -Coumadin therapy d/t AFib  -s/p 2/22 Evacuation of right breast hematoma and drain placement, two procedures (had to return to OR d/t recurrence of bleeding in the recovery area)  -Acute blood loss anemia on chronic anemia; Hgb 8.1/stable at discharge  -2/22 RBC transfusion, 1unit     Procedure/Surgery Information   Procedure(s):  IRRIGATION AND DEBRIDEMENT, BREAST   Surgeon(s) and Role:     * Avery Gloria MD - Primary     History of Present Illness   Kayley Putnam is a 86 year old female who underwent stereotactic core biopsy of a breast lesion and developed a large breast hematoma.  She was brought to the operating room on 2/22 at 4pm for evacuation and drainage of this hematoma due to concern for skin necrosis.  Completed after coumadin held, Vit K given, and INR reversed to 1.71 at 6:53am on 2/22 (2.38 on admission).    Hospital Course   Kayley Putnam was admitted on 2/21/2020.  The following problems were addressed during her hospitalization:  Patient Active Problem List   Diagnosis     AF (atrial fibrillation) (H)     Atherosclerosis of aorta (H)     Hyperlipidemia     HTN (hypertension)     CAD (coronary artery disease)     Overweight and obesity(278.0)     Diarrhea     Anemia     Malignant neoplasm of right female breast, unspecified estrogen receptor status, unspecified site of breast (H)     Hematoma of breast     Breast hematoma     Jenifer-operative antibiotic therapy included: Clindamycin.  Post-operative pain control: was via IV until able to tolerate PO intake and  transitioned to PO pain meds.    Remarkable hospital course events: In the recovery room, she began putting significant amounts of blood out of her drain that was placed in her breast.  Examination showed the breast to be nicely decompressed by the drain however, due to significant drain output, it was recommended that she return to the operating room for reexploration of the surgical site and control of bleeding.  Repeat Evacuation of hematoma was completed with replacement of drain.  Patient received transfusion on 1unit PRBCs after Hgb of 6.9.  Drain output after the second procedure was minimal and patient did well.  She felt very wiped out and tired on POD#1.  She was able to resume diet/nutrition intake and improve independent activity.  She also obtained education on drain care for at home.  She was ready to discharge home on POD#2.   Plan on place to keep coumadin on hold until upcoming surgery on March 4.  Please see Hospitalist notes for further details if needed.    Kayley met all criteria for release on 2/24/2020 12:10 PM.  She was afebrile, tolerating diet, pain controlled on PO meds, ambulating well, and had return of bowel function.    Medications discontinued or adjusted during this hospitalization: see discharge med list below.    Antibiotics prescribed at discharge: None prescribed     Imaging study follow up needs:   -No studies require specific follow-up    Discharge Instructions and Follow-Up:  Discharge diet: Regular   Discharge activity: Lifting restricted to 10 pounds with right upper extremity  No heavy lifting, pushing, pulling with right upper extremity   Discharge follow-up: Follow up with Dr. Pope   Wound/Incision care: Sutures and drain out at next procedure.  Drain may be removed earlier if output clears and less than 10cc/day.       Charla Samaniego PA-C      Discharge Disposition   Discharged to home   Condition at discharge: Stable    Pending Results   Unresulted Labs Ordered  in the Past 30 Days of this Admission     Date and Time Order Name Status Description    2/22/2020 1617 Fungus Culture, non-blood Preliminary     2/22/2020 1617 Fluid Culture Aerobic Bacterial Preliminary           Primary Care Physician   Taj Gordillo    Consultations This Hospital Stay   HOSPITALIST IP CONSULT    Discharge Orders      Reason for your hospital stay    You had a breast hematoma evacuated and drain placed     Follow-up and recommended labs and tests     Follow up with PA this week if drain is less than 10 ml/day.  Otherwise keep drain in place until surgery on March 4th.  Hold coumadin until surgery on March 4th. We are [address] at [address], [place]  [address]     Activity    Your activity upon discharge: activity as tolerated     Tubes and drains    You are going home with the following tubes or drains: ANDRESSA.  Follow instructions provided to care for your tube     Diet    Follow this diet upon discharge: Regular     Discharge Medications   Discharge Medication List as of 2/24/2020 11:27 AM      CONTINUE these medications which have NOT CHANGED    Details   Acetaminophen (TYLENOL EXTRA STRENGTH PO) Take 500 mg by mouth At Bedtime , Historical      amoxicillin (AMOXIL) 500 MG capsule Take 500 mg by mouth 4 tablets prior to dental appointments, R-2, Historical      Ascorbic Acid (VITAMIN C) 500 MG CAPS Take 500 mg by mouth daily , Historical      atorvastatin (LIPITOR) 20 MG tablet Take 20 mg by mouth At Bedtime , Historical      calcium-vitamin D (CALCIUM 600 + D) 600-400 MG-UNIT per tablet Take 1 tablet by mouth 2 times daily, Historical      darbepoetin john-polysorbate (ARANESP, ALBUMIN FREE,) 300 MCG/ML SOLN Inject Subcutaneous every 14 days Inject every 2 weeks , Historical      FUROSEMIDE PO Take 20 mg by mouth daily, Historical      metoprolol (TOPROL-XL) 25 MG 24 hr tablet Take 25 mg by mouth 2 times daily, Historical         STOP taking these medications        warfarin ANTICOAGULANT (COUMADIN) 4 MG tablet Comments:   Reason for Stopping:         warfarin ANTICOAGULANT (COUMADIN) 6 MG tablet Comments:   Reason for Stopping:             Allergies   Allergies   Allergen Reactions     Amlodipine Dizziness     Codeine Nausea     Flecainide Dizziness     Meperidine Nausea and Vomiting     Promethazine      Jittery     Ceftin [Cefuroxime] Itching and Rash     Tetracycline Rash     Vancomycin Itching and Rash     Data   Most Recent 3 CBC's:  Recent Labs   Lab Test 02/24/20  0806 02/23/20  0642 02/22/20  1825   WBC 1.9* 1.0* 0.9*   HGB 8.1* 7.9* 6.9*   * 112* 119*   PLT 74* 55* 52*      Most Recent 3 BMP's:  Recent Labs   Lab Test 02/24/20  0806 02/23/20  0642 02/21/20 1954 03/16/18  0702     --  135 133   POTASSIUM 4.2  --  3.9 3.8   CHLORIDE 105  --  103 98   CO2 26  --  29 27   BUN 22  --  23 18   CR 0.97  --  0.88 0.82   ANIONGAP 5  --  3 8   NATALIA 8.5  --  9.0 8.7   GLC 97 124* 116* 90     Most Recent 6 Bacteria Isolates From Any Culture (See EPIC Reports for Culture Details):  Recent Labs   Lab Test 02/22/20  1612   CULT Culture negative after 2 days  Culture negative monitoring continues     Most Recent TSH, T4 and A1c Labs:No lab results found.  Results for orders placed or performed during the hospital encounter of 02/11/20   MA Stereotactic Breast Biopsy Vacuum Rt    Addendum: 2/13/2020    Pathology Results:  Benign breast tissue with stromal calcifications.  Please see full pathology report for further details.     Results are concordant with imaging findings.     Recommendation: Continued oncologic/surgical management.    DELMI YE MD      Narrative    Stereotactic biopsy of the RIGHT breast.    Comparisons: Mammogram dated 1/22/2020.    HISTORY:    Calcifications requiring biopsy.    FINDINGS: Procedure, risks, benefits and alternatives were discussed  with the patient and the patient gave written and verbal consent. The  patient was positioned for  stereotactic biopsy. Aseptic technique was  utilized. 1% lidocaine was used for skin anesthesia and 15 cc of 1%  lidocaine with epinephrine were utilized for deep tissue anesthesia.   Using stereotactic technique and an 9 gauge vacuum assisted biopsy  needle, multiple specimens were obtained and images were archived.  Less than 5 mL blood loss. Calcifications are seen in the biopsy  specimen.     Location: 10:30 position, 8 cm from the nipple.  Marker: HydroMark, shaped like a coil with looped ends.    Pressure was held over this area for approximately 15 minutes. A  dressing was placed and care instructions were discussed with the  patient.    Post biopsy tomosynthesis images demonstrate marker deployment.  Dedicated post biopsy mammogram not performed because of patient's  bleeding risk.      Impression    IMPRESSION:    Uncomplicated stereotactic vacuum assisted core needle  biopsy of the RIGHT breast.    DELMI YE MD      no

## 2020-02-26 ENCOUNTER — OFFICE VISIT (OUTPATIENT)
Dept: SURGERY | Facility: CLINIC | Age: 85
End: 2020-02-26
Payer: MEDICARE

## 2020-02-26 ENCOUNTER — TELEPHONE (OUTPATIENT)
Dept: SURGERY | Facility: CLINIC | Age: 85
End: 2020-02-26

## 2020-02-26 VITALS
DIASTOLIC BLOOD PRESSURE: 42 MMHG | OXYGEN SATURATION: 96 % | RESPIRATION RATE: 16 BRPM | WEIGHT: 168 LBS | SYSTOLIC BLOOD PRESSURE: 142 MMHG | HEART RATE: 67 BPM | HEIGHT: 65 IN | BODY MASS INDEX: 27.99 KG/M2

## 2020-02-26 DIAGNOSIS — Z09 SURGICAL FOLLOWUP VISIT: Primary | ICD-10-CM

## 2020-02-26 PROCEDURE — 99024 POSTOP FOLLOW-UP VISIT: CPT | Performed by: PHYSICIAN ASSISTANT

## 2020-02-26 ASSESSMENT — MIFFLIN-ST. JEOR: SCORE: 1194.98

## 2020-02-26 NOTE — PROGRESS NOTES
[facility] Note     Subjective:  Kayley Putnam is here for drain removal. She underwent a evacuation of hematoma, cautery and placement of drain by Dr. Drake on February / 22 / 2020. Today she tells me she is doing well.  Her drain output has been scant amounts.    Objective:  Inc - Healing well, sutures in place. Area is firm to touch, ecchymosis still present    Assessment:  S/p  evacuation of hematoma, cautery and placement of drain  - drain removal completed.     Plan:  Keep area clean and dry.  Has surgery scheduled for March 4th with Dr Pope.     She was instructed to call if fever, increasing pain, redness or drainage at the incision sites occurs.      Surekha Flood PA-C    Please route or send letter to:  *None*

## 2020-02-26 NOTE — TELEPHONE ENCOUNTER
Patient of Dr. Pope with hx of stereotactic bx that resulted in a large hematoma of R breast.  She is s/p Evacuation of hematoma, cautery, placement of drain by Dr. Gloria on 2/22/20.     Pt is calling today to see if she can be seen to have drain removed.  She has an oncology appointment at 2:00 in the next building.    States that since discharge from hospital she has been having scant amounts out in her drain.  The last 2 days her 24 hour output from drain has been 1 ml or less.      Hospital discharge instructions state: Follow-up and recommended labs and tests  Follow up with PA this week if drain is less than 10 ml/day. Otherwise keep drain in place until surgery on March 4th.  Hold coumadin until surgery on March 4th.    Discussed with Cheryl Halsted, PA-C.,  Patient scheduled for drain removal in clinic today with PA at 1:00pm.    Pt agrees with plan.

## 2020-02-27 LAB
BACTERIA SPEC CULT: NO GROWTH
Lab: NORMAL
SPECIMEN SOURCE: NORMAL

## 2020-02-27 RX ORDER — WARFARIN SODIUM 6 MG/1
6 TABLET ORAL DAILY
Status: ON HOLD | COMMUNITY
End: 2020-03-04

## 2020-02-27 RX ORDER — WARFARIN SODIUM 4 MG/1
4 TABLET ORAL DAILY
Status: ON HOLD | COMMUNITY
End: 2020-03-04

## 2020-03-04 ENCOUNTER — HOSPITAL ENCOUNTER (OUTPATIENT)
Dept: NUCLEAR MEDICINE | Facility: CLINIC | Age: 85
Setting detail: NUCLEAR MEDICINE
Discharge: HOME | End: 2020-03-04
Attending: SURGERY | Admitting: SURGERY
Payer: MEDICARE

## 2020-03-04 ENCOUNTER — APPOINTMENT (OUTPATIENT)
Dept: MAMMOGRAPHY | Facility: CLINIC | Age: 85
End: 2020-03-04
Attending: SURGERY
Payer: MEDICARE

## 2020-03-04 ENCOUNTER — SURGERY (OUTPATIENT)
Age: 85
End: 2020-03-04
Payer: MEDICARE

## 2020-03-04 ENCOUNTER — APPOINTMENT (OUTPATIENT)
Dept: SURGERY | Facility: PHYSICIAN GROUP | Age: 85
End: 2020-03-04
Payer: MEDICARE

## 2020-03-04 ENCOUNTER — HOSPITAL ENCOUNTER (OUTPATIENT)
Dept: ULTRASOUND IMAGING | Facility: CLINIC | Age: 85
Setting detail: NUCLEAR MEDICINE
End: 2020-03-04
Attending: SURGERY | Admitting: SURGERY
Payer: MEDICARE

## 2020-03-04 ENCOUNTER — HOSPITAL ENCOUNTER (OUTPATIENT)
Facility: CLINIC | Age: 85
Discharge: HOME | End: 2020-03-04
Attending: SURGERY | Admitting: SURGERY
Payer: MEDICARE

## 2020-03-04 ENCOUNTER — HOSPITAL ENCOUNTER (OUTPATIENT)
Dept: MAMMOGRAPHY | Facility: CLINIC | Age: 85
End: 2020-03-04
Attending: SURGERY | Admitting: SURGERY
Payer: MEDICARE

## 2020-03-04 ENCOUNTER — DOCUMENTATION ONLY (OUTPATIENT)
Dept: MAMMOGRAPHY | Facility: CLINIC | Age: 85
End: 2020-03-04

## 2020-03-04 ENCOUNTER — ANESTHESIA (OUTPATIENT)
Dept: SURGERY | Facility: CLINIC | Age: 85
End: 2020-03-04
Payer: MEDICARE

## 2020-03-04 ENCOUNTER — ANESTHESIA EVENT (OUTPATIENT)
Dept: SURGERY | Facility: CLINIC | Age: 85
End: 2020-03-04
Payer: MEDICARE

## 2020-03-04 VITALS
HEART RATE: 60 BPM | WEIGHT: 158 LBS | OXYGEN SATURATION: 98 % | TEMPERATURE: 97.4 F | DIASTOLIC BLOOD PRESSURE: 80 MMHG | BODY MASS INDEX: 26.98 KG/M2 | RESPIRATION RATE: 16 BRPM | HEIGHT: 64 IN | SYSTOLIC BLOOD PRESSURE: 169 MMHG

## 2020-03-04 DIAGNOSIS — C50.511 MALIGNANT NEOPLASM OF LOWER-OUTER QUADRANT OF RIGHT BREAST OF FEMALE, ESTROGEN RECEPTOR POSITIVE (H): Primary | ICD-10-CM

## 2020-03-04 DIAGNOSIS — Z17.0 MALIGNANT NEOPLASM OF LOWER-OUTER QUADRANT OF RIGHT BREAST OF FEMALE, ESTROGEN RECEPTOR POSITIVE (H): Primary | ICD-10-CM

## 2020-03-04 DIAGNOSIS — C50.911 MALIGNANT NEOPLASM OF RIGHT FEMALE BREAST, UNSPECIFIED ESTROGEN RECEPTOR STATUS, UNSPECIFIED SITE OF BREAST (H): ICD-10-CM

## 2020-03-04 LAB
ABO + RH BLD: NORMAL
ABO + RH BLD: NORMAL
BLD GP AB SCN SERPL QL: NORMAL
BLOOD BANK CMNT PATIENT-IMP: NORMAL
ERYTHROCYTE [DISTWIDTH] IN BLOOD BY AUTOMATED COUNT: 16.7 % (ref 10–15)
HCT VFR BLD AUTO: 28.4 % (ref 35–47)
HGB BLD-MCNC: 9 G/DL (ref 11.7–15.7)
INR PPP: 1.25 (ref 0.86–1.14)
MCH RBC QN AUTO: 37 PG (ref 26.5–33)
MCHC RBC AUTO-ENTMCNC: 31.7 G/DL (ref 31.5–36.5)
MCV RBC AUTO: 117 FL (ref 78–100)
PLATELET # BLD AUTO: 98 10E9/L (ref 150–450)
RBC # BLD AUTO: 2.43 10E12/L (ref 3.8–5.2)
SPECIMEN EXP DATE BLD: NORMAL
WBC # BLD AUTO: 1.7 10E9/L (ref 4–11)

## 2020-03-04 PROCEDURE — 88329 PATH CONSLTJ DRG SURG: CPT | Performed by: SURGERY

## 2020-03-04 PROCEDURE — 37000009 ZZH ANESTHESIA TECHNICAL FEE, EACH ADDTL 15 MIN: Performed by: SURGERY

## 2020-03-04 PROCEDURE — 88307 TISSUE EXAM BY PATHOLOGIST: CPT | Mod: 26 | Performed by: SURGERY

## 2020-03-04 PROCEDURE — 71000027 ZZH RECOVERY PHASE 2 EACH 15 MINS: Performed by: SURGERY

## 2020-03-04 PROCEDURE — 86901 BLOOD TYPING SEROLOGIC RH(D): CPT | Performed by: ANESTHESIOLOGY

## 2020-03-04 PROCEDURE — 40000268 MA BREAST SPECIMEN RIGHT OR

## 2020-03-04 PROCEDURE — 37000008 ZZH ANESTHESIA TECHNICAL FEE, 1ST 30 MIN: Performed by: SURGERY

## 2020-03-04 PROCEDURE — A9520 TC99 TILMANOCEPT DIAG 0.5MCI: HCPCS | Performed by: SURGERY

## 2020-03-04 PROCEDURE — 40000986 MA POST PROCEDURE RIGHT

## 2020-03-04 PROCEDURE — 86850 RBC ANTIBODY SCREEN: CPT | Performed by: ANESTHESIOLOGY

## 2020-03-04 PROCEDURE — 36000060 ZZH SURGERY LEVEL 3 W FLUORO 1ST 30 MIN: Performed by: SURGERY

## 2020-03-04 PROCEDURE — 25000128 H RX IP 250 OP 636: Performed by: ANESTHESIOLOGY

## 2020-03-04 PROCEDURE — 40000306 ZZH STATISTIC PRE PROC ASSESS II: Performed by: SURGERY

## 2020-03-04 PROCEDURE — 34300033 ZZH RX 343: Performed by: SURGERY

## 2020-03-04 PROCEDURE — 71000013 ZZH RECOVERY PHASE 1 LEVEL 1 EA ADDTL HR: Performed by: SURGERY

## 2020-03-04 PROCEDURE — 36415 COLL VENOUS BLD VENIPUNCTURE: CPT | Performed by: PHYSICIAN ASSISTANT

## 2020-03-04 PROCEDURE — 88307 TISSUE EXAM BY PATHOLOGIST: CPT | Performed by: SURGERY

## 2020-03-04 PROCEDURE — 86900 BLOOD TYPING SEROLOGIC ABO: CPT | Performed by: ANESTHESIOLOGY

## 2020-03-04 PROCEDURE — 25000125 ZZHC RX 250: Performed by: NURSE ANESTHETIST, CERTIFIED REGISTERED

## 2020-03-04 PROCEDURE — 25000128 H RX IP 250 OP 636: Performed by: SURGERY

## 2020-03-04 PROCEDURE — 25000128 H RX IP 250 OP 636: Performed by: NURSE ANESTHETIST, CERTIFIED REGISTERED

## 2020-03-04 PROCEDURE — A4641 RADIOPHARM DX AGENT NOC: HCPCS

## 2020-03-04 PROCEDURE — 25000125 ZZHC RX 250: Performed by: ANESTHESIOLOGY

## 2020-03-04 PROCEDURE — 38525 BIOPSY/REMOVAL LYMPH NODES: CPT | Mod: RT | Performed by: SURGERY

## 2020-03-04 PROCEDURE — 25000125 ZZHC RX 250: Performed by: RADIOLOGY

## 2020-03-04 PROCEDURE — 25000125 ZZHC RX 250: Performed by: SURGERY

## 2020-03-04 PROCEDURE — 27210794 ZZH OR GENERAL SUPPLY STERILE: Performed by: SURGERY

## 2020-03-04 PROCEDURE — 38792 RA TRACER ID OF SENTINL NODE: CPT

## 2020-03-04 PROCEDURE — 85610 PROTHROMBIN TIME: CPT | Performed by: PHYSICIAN ASSISTANT

## 2020-03-04 PROCEDURE — 25800030 ZZH RX IP 258 OP 636: Performed by: ANESTHESIOLOGY

## 2020-03-04 PROCEDURE — 36000058 ZZH SURGERY LEVEL 3 EA 15 ADDTL MIN: Performed by: SURGERY

## 2020-03-04 PROCEDURE — 71000012 ZZH RECOVERY PHASE 1 LEVEL 1 FIRST HR: Performed by: SURGERY

## 2020-03-04 PROCEDURE — 85027 COMPLETE CBC AUTOMATED: CPT | Performed by: PHYSICIAN ASSISTANT

## 2020-03-04 PROCEDURE — 19301 PARTIAL MASTECTOMY: CPT | Mod: RT | Performed by: SURGERY

## 2020-03-04 PROCEDURE — 25000128 H RX IP 250 OP 636: Performed by: PHYSICIAN ASSISTANT

## 2020-03-04 RX ORDER — HYDRALAZINE HYDROCHLORIDE 20 MG/ML
2.5-5 INJECTION INTRAMUSCULAR; INTRAVENOUS EVERY 10 MIN PRN
Status: DISCONTINUED | OUTPATIENT
Start: 2020-03-04 | End: 2020-03-04 | Stop reason: HOSPADM

## 2020-03-04 RX ORDER — FENTANYL CITRATE 50 UG/ML
INJECTION, SOLUTION INTRAMUSCULAR; INTRAVENOUS PRN
Status: DISCONTINUED | OUTPATIENT
Start: 2020-03-04 | End: 2020-03-04

## 2020-03-04 RX ORDER — ONDANSETRON 2 MG/ML
INJECTION INTRAMUSCULAR; INTRAVENOUS PRN
Status: DISCONTINUED | OUTPATIENT
Start: 2020-03-04 | End: 2020-03-04

## 2020-03-04 RX ORDER — BUPIVACAINE HYDROCHLORIDE 5 MG/ML
INJECTION, SOLUTION EPIDURAL; INTRACAUDAL PRN
Status: DISCONTINUED | OUTPATIENT
Start: 2020-03-04 | End: 2020-03-04 | Stop reason: HOSPADM

## 2020-03-04 RX ORDER — AMOXICILLIN 250 MG
1-2 CAPSULE ORAL 2 TIMES DAILY
Qty: 10 TABLET | Refills: 0 | Status: SHIPPED | OUTPATIENT
Start: 2020-03-04

## 2020-03-04 RX ORDER — METOPROLOL TARTRATE 1 MG/ML
1-2 INJECTION, SOLUTION INTRAVENOUS EVERY 5 MIN PRN
Status: DISCONTINUED | OUTPATIENT
Start: 2020-03-04 | End: 2020-03-04 | Stop reason: HOSPADM

## 2020-03-04 RX ORDER — HYDROMORPHONE HYDROCHLORIDE 1 MG/ML
.3-.5 INJECTION, SOLUTION INTRAMUSCULAR; INTRAVENOUS; SUBCUTANEOUS EVERY 10 MIN PRN
Status: DISCONTINUED | OUTPATIENT
Start: 2020-03-04 | End: 2020-03-04 | Stop reason: HOSPADM

## 2020-03-04 RX ORDER — ONDANSETRON 2 MG/ML
4 INJECTION INTRAMUSCULAR; INTRAVENOUS EVERY 30 MIN PRN
Status: DISCONTINUED | OUTPATIENT
Start: 2020-03-04 | End: 2020-03-04 | Stop reason: HOSPADM

## 2020-03-04 RX ORDER — NALOXONE HYDROCHLORIDE 0.4 MG/ML
.1-.4 INJECTION, SOLUTION INTRAMUSCULAR; INTRAVENOUS; SUBCUTANEOUS
Status: DISCONTINUED | OUTPATIENT
Start: 2020-03-04 | End: 2020-03-04 | Stop reason: HOSPADM

## 2020-03-04 RX ORDER — SCOLOPAMINE TRANSDERMAL SYSTEM 1 MG/1
1 PATCH, EXTENDED RELEASE TRANSDERMAL
Status: DISCONTINUED | OUTPATIENT
Start: 2020-03-04 | End: 2020-03-04 | Stop reason: HOSPADM

## 2020-03-04 RX ORDER — WARFARIN SODIUM 4 MG/1
4 TABLET ORAL DAILY
Start: 2020-03-04

## 2020-03-04 RX ORDER — SODIUM CHLORIDE, SODIUM LACTATE, POTASSIUM CHLORIDE, CALCIUM CHLORIDE 600; 310; 30; 20 MG/100ML; MG/100ML; MG/100ML; MG/100ML
INJECTION, SOLUTION INTRAVENOUS CONTINUOUS
Status: DISCONTINUED | OUTPATIENT
Start: 2020-03-04 | End: 2020-03-04 | Stop reason: HOSPADM

## 2020-03-04 RX ORDER — ALBUTEROL SULFATE 0.83 MG/ML
2.5 SOLUTION RESPIRATORY (INHALATION) EVERY 4 HOURS PRN
Status: DISCONTINUED | OUTPATIENT
Start: 2020-03-04 | End: 2020-03-04 | Stop reason: HOSPADM

## 2020-03-04 RX ORDER — OXYCODONE HYDROCHLORIDE 5 MG/1
5-10 TABLET ORAL EVERY 4 HOURS PRN
Qty: 12 TABLET | Refills: 0 | Status: SHIPPED | OUTPATIENT
Start: 2020-03-04 | End: 2020-03-20

## 2020-03-04 RX ORDER — OXYCODONE HYDROCHLORIDE 5 MG/1
10 TABLET ORAL
Status: DISCONTINUED | OUTPATIENT
Start: 2020-03-04 | End: 2020-03-04 | Stop reason: HOSPADM

## 2020-03-04 RX ORDER — WARFARIN SODIUM 6 MG/1
6 TABLET ORAL DAILY
Refills: 0
Start: 2020-03-04

## 2020-03-04 RX ORDER — LIDOCAINE 40 MG/G
CREAM TOPICAL
Status: DISCONTINUED | OUTPATIENT
Start: 2020-03-04 | End: 2020-03-04 | Stop reason: HOSPADM

## 2020-03-04 RX ORDER — ACETAMINOPHEN 325 MG/1
975 TABLET ORAL EVERY 6 HOURS PRN
Qty: 50 TABLET | Refills: 0 | Status: SHIPPED | OUTPATIENT
Start: 2020-03-04

## 2020-03-04 RX ORDER — MEPERIDINE HYDROCHLORIDE 50 MG/ML
12.5 INJECTION INTRAMUSCULAR; INTRAVENOUS; SUBCUTANEOUS
Status: DISCONTINUED | OUTPATIENT
Start: 2020-03-04 | End: 2020-03-04 | Stop reason: HOSPADM

## 2020-03-04 RX ORDER — LABETALOL HYDROCHLORIDE 5 MG/ML
INJECTION, SOLUTION INTRAVENOUS PRN
Status: DISCONTINUED | OUTPATIENT
Start: 2020-03-04 | End: 2020-03-04

## 2020-03-04 RX ORDER — CEFAZOLIN SODIUM 2 G/100ML
2 INJECTION, SOLUTION INTRAVENOUS
Status: COMPLETED | OUTPATIENT
Start: 2020-03-04 | End: 2020-03-04

## 2020-03-04 RX ORDER — PROPOFOL 10 MG/ML
INJECTION, EMULSION INTRAVENOUS PRN
Status: DISCONTINUED | OUTPATIENT
Start: 2020-03-04 | End: 2020-03-04

## 2020-03-04 RX ORDER — PROPOFOL 10 MG/ML
INJECTION, EMULSION INTRAVENOUS CONTINUOUS PRN
Status: DISCONTINUED | OUTPATIENT
Start: 2020-03-04 | End: 2020-03-04

## 2020-03-04 RX ORDER — MAGNESIUM HYDROXIDE 1200 MG/15ML
LIQUID ORAL PRN
Status: DISCONTINUED | OUTPATIENT
Start: 2020-03-04 | End: 2020-03-04 | Stop reason: HOSPADM

## 2020-03-04 RX ORDER — ONDANSETRON 4 MG/1
4 TABLET, ORALLY DISINTEGRATING ORAL EVERY 30 MIN PRN
Status: DISCONTINUED | OUTPATIENT
Start: 2020-03-04 | End: 2020-03-04 | Stop reason: HOSPADM

## 2020-03-04 RX ORDER — FENTANYL CITRATE 50 UG/ML
25-50 INJECTION, SOLUTION INTRAMUSCULAR; INTRAVENOUS
Status: DISCONTINUED | OUTPATIENT
Start: 2020-03-04 | End: 2020-03-04 | Stop reason: HOSPADM

## 2020-03-04 RX ORDER — OXYCODONE HYDROCHLORIDE 5 MG/1
5 TABLET ORAL EVERY 4 HOURS PRN
Status: DISCONTINUED | OUTPATIENT
Start: 2020-03-04 | End: 2020-03-04 | Stop reason: HOSPADM

## 2020-03-04 RX ORDER — DEXAMETHASONE SODIUM PHOSPHATE 4 MG/ML
INJECTION, SOLUTION INTRA-ARTICULAR; INTRALESIONAL; INTRAMUSCULAR; INTRAVENOUS; SOFT TISSUE PRN
Status: DISCONTINUED | OUTPATIENT
Start: 2020-03-04 | End: 2020-03-04

## 2020-03-04 RX ORDER — ACETAMINOPHEN 325 MG/1
650 TABLET ORAL
Status: DISCONTINUED | OUTPATIENT
Start: 2020-03-04 | End: 2020-03-04 | Stop reason: HOSPADM

## 2020-03-04 RX ORDER — CEFAZOLIN SODIUM 1 G/3ML
1 INJECTION, POWDER, FOR SOLUTION INTRAMUSCULAR; INTRAVENOUS SEE ADMIN INSTRUCTIONS
Status: DISCONTINUED | OUTPATIENT
Start: 2020-03-04 | End: 2020-03-04 | Stop reason: HOSPADM

## 2020-03-04 RX ORDER — FENTANYL CITRATE 50 UG/ML
25-50 INJECTION, SOLUTION INTRAMUSCULAR; INTRAVENOUS EVERY 5 MIN PRN
Status: DISCONTINUED | OUTPATIENT
Start: 2020-03-04 | End: 2020-03-04 | Stop reason: HOSPADM

## 2020-03-04 RX ADMIN — LIDOCAINE HYDROCHLORIDE 50 MG: 10 INJECTION, SOLUTION EPIDURAL; INFILTRATION; INTRACAUDAL; PERINEURAL at 10:49

## 2020-03-04 RX ADMIN — METHYLENE BLUE 2 ML: 5 INJECTION INTRAVENOUS at 11:01

## 2020-03-04 RX ADMIN — PROPOFOL 120 MG: 10 INJECTION, EMULSION INTRAVENOUS at 10:49

## 2020-03-04 RX ADMIN — FENTANYL CITRATE 100 MCG: 50 INJECTION, SOLUTION INTRAMUSCULAR; INTRAVENOUS at 10:49

## 2020-03-04 RX ADMIN — HYDRALAZINE HYDROCHLORIDE 5 MG: 20 INJECTION INTRAMUSCULAR; INTRAVENOUS at 13:53

## 2020-03-04 RX ADMIN — TILMANOCEPT 0.53 MILLICURIE: KIT at 09:03

## 2020-03-04 RX ADMIN — SODIUM CHLORIDE 600 ML: 900 IRRIGANT IRRIGATION at 12:30

## 2020-03-04 RX ADMIN — PROPOFOL 50 MCG/KG/MIN: 10 INJECTION, EMULSION INTRAVENOUS at 10:54

## 2020-03-04 RX ADMIN — SODIUM CHLORIDE, POTASSIUM CHLORIDE, SODIUM LACTATE AND CALCIUM CHLORIDE: 600; 310; 30; 20 INJECTION, SOLUTION INTRAVENOUS at 10:42

## 2020-03-04 RX ADMIN — LABETALOL HYDROCHLORIDE 2.5 MG: 5 INJECTION INTRAVENOUS at 11:33

## 2020-03-04 RX ADMIN — BUPIVACAINE HYDROCHLORIDE 20 ML: 5 INJECTION, SOLUTION EPIDURAL; INTRACAUDAL at 12:24

## 2020-03-04 RX ADMIN — SCOPOLAMINE 1 PATCH: 1 PATCH TRANSDERMAL at 09:59

## 2020-03-04 RX ADMIN — LIDOCAINE HYDROCHLORIDE 5 ML: 10 INJECTION, SOLUTION EPIDURAL; INFILTRATION; INTRACAUDAL; PERINEURAL at 08:41

## 2020-03-04 RX ADMIN — HYDRALAZINE HYDROCHLORIDE 5 MG: 20 INJECTION INTRAMUSCULAR; INTRAVENOUS at 14:11

## 2020-03-04 RX ADMIN — CEFAZOLIN SODIUM 2 G: 2 INJECTION, SOLUTION INTRAVENOUS at 10:42

## 2020-03-04 RX ADMIN — ONDANSETRON HYDROCHLORIDE 4 MG: 2 INJECTION, SOLUTION INTRAVENOUS at 12:47

## 2020-03-04 RX ADMIN — DEXAMETHASONE SODIUM PHOSPHATE 4 MG: 4 INJECTION, SOLUTION INTRA-ARTICULAR; INTRALESIONAL; INTRAMUSCULAR; INTRAVENOUS; SOFT TISSUE at 10:49

## 2020-03-04 ASSESSMENT — MIFFLIN-ST. JEOR: SCORE: 1149.43

## 2020-03-04 ASSESSMENT — ENCOUNTER SYMPTOMS: DYSRHYTHMIAS: 1

## 2020-03-04 NOTE — TELEPHONE ENCOUNTER
Spoke with patient after seed localization procedure.  Patient voices feeling good, denies pain or feeling faint.  Education provided regarding home care after procedure.  Reviewed after ROM teaching sheet.  Patient transported via Nursing support to pre op. Denies concerns or further questions.

## 2020-03-04 NOTE — H&P
Addendum to Pre-operative H&P. Cardiac exam was not documented on H&P (appears to be due to a typo). Cardiac examination is normal with regular rate, rhythm (through EKG does show underlying atrial fibrillation), and no murmurs.     Lali Pope MD

## 2020-03-04 NOTE — ANESTHESIA POSTPROCEDURE EVALUATION
Patient: Kayley Putnam    Procedure(s):  RIGHT BREAST SEED LOCALIZED LUMPECTOMY WITH RIGHT SENTINEL NODE BIOPSY    Diagnosis:Malignant neoplasm of right female breast, unspecified estrogen receptor status, unspecified site of breast (H) [C50.911]  Diagnosis Additional Information: No value filed.    Anesthesia Type:  General, LMA    Note:  Anesthesia Post Evaluation    Patient location during evaluation: PACU  Patient participation: Able to fully participate in evaluation  Level of consciousness: awake  Pain management: adequate  Airway patency: patent  Cardiovascular status: acceptable  Respiratory status: acceptable  Hydration status: acceptable  PONV: controlled     Anesthetic complications: None          Last vitals:  Vitals:    03/04/20 1423 03/04/20 1449 03/04/20 1515   BP: (!) 151/62 (!) 160/73 (!) 169/80   Pulse:      Resp: 25 14 16   Temp:  97.4  F (36.3  C)    SpO2: 93% 94% 98%         Electronically Signed By: Franklin Helms DO  March 4, 2020  4:06 PM

## 2020-03-04 NOTE — OP NOTE
General Surgery Operative Note      Pre-operative diagnosis: Right breast mass   Post-operative diagnosis: Same   Procedure: Right seed-localized breast biopsy, lower outer quadrant. Right sentinel lymph node biopsy   Surgeon: Lali Pope MD   Assistant(s): Surekha Flood PA-C  The Physician Assistant was medically necessary for their expertise in prepping, suctioning, suturing and retraction.   Anesthesia: General    Estimated blood loss:   100 ml     Specimens: ID Type Source Tests Collected by Time Destination   A : Right Breast Seed Localized Lumpectomy Tissue Breast, Right SURGICAL PATHOLOGY EXAM Lali Pope MD 3/4/2020 11:47 AM    B : right sentinel axillary  node #1 Tissue Axilla, Right SURGICAL PATHOLOGY EXAM Lali Pope MD 3/4/2020 12:06 PM    C : right sentinel axillary node #2 Tissue Axilla, Right SURGICAL PATHOLOGY EXAM Lali Pope MD 3/4/2020 12:09 PM    D : right sentinel axillary node #3 Tissue Axilla, Right SURGICAL PATHOLOGY EXAM Lali Pope MD 3/4/2020 12:10 PM    E : right breast lumpectomy new inferior margin Tissue Breast, Right SURGICAL PATHOLOGY EXAM Lali Pope MD 3/4/2020 12:18 PM           INDICATIONS:  Right breast a mass, lower outer quadrant.  Percutaneous biopsy reveals invasive ductal carcinoma. We discussed excision and patient agreed to proceed. Of note, the patient had undergone 2 percutaneous core needle breast biopsies in recent weeks which had resulted in a large hematoma involving the entire lateral half of her breast. She had undergone hematoma evacuation with my partner, Avery Gloria MD, 11 days ago.     DESCRIPTION OF PROCEDURE:  The patient was placed on the table in supine position.  Anesthetic was administered.  Prior to prep, provey blue dye was injected into the tissue just lateral to the areola.  The breast was massaged for 5 minutes to encourage adequate lymphatic uptake of the blue dye.     The Right breast was  prepped and draped in standard sterile fashion.  We used the seed placed in the [facility] as well as the post-seed mammograms to localize the area of interest.  After anesthetizing the skin we made a curvilinear incision centered at the 7:30 o'clock position.  We carried the incision down into the breast tissue and excised the area of interest, including the seed.  This was performed with added difficulty due to the presence of severe mass-like induration of the breast tissue. This was marked with a short stitch superiorly, a long stitch laterally, one long-one short anteriorly.  A specimen mammogram was performed and sent via PACS to the Breast Center.  The breast radiologist confirmed the presence of the area of interest including the seed and the clip which had been placed at the time of her biopsy.  Gross margin was difficult to interpret due to the presence of dense induration/hematoma, but seemed to show that the tumor was closest to the inferior margin, which was ~ 5 mm away. Therefore, I re-excised with  Inferior margin for permanent pathology.    [place] node biopsy was then performed.  Using the [place] counter, an area of radioactivity was identified at the edge of the hairbearing area of her axilla. An incision was centered over this area and dissection was carried through the clavipectoral fascia with electrocautery. Radioactivity counts were used to find the sentinel node which was identified by high radioactivity counts. Notably, the blue dye was not present in the axilla. The most radioactive lymph node was excised from the axilla using electrocautery. The first sentinel lymph node had radioactivity counts of 3380 ex vivo and was not blue in color.  Within the axilla, a second sentinel lymph node was identified and excised using electrocautery.  The second sentinel lymph node had radioactivity counts of 586 ex vivo and was not blue in color.  A third sentinel lymph node had radioactivity counts  of 331 ex vivo and was not blue in color.  The remainder of the axilla inspected using a gamma probe no additional areas of radioactivity were identified that were within 10% of the first sentinel lymph node      Hemostasis was maintained throughout with electrocautery.  The skin was closed in layers with 3-0 vicryl interrupted sutures and with running 4-0 Vicryl subcuticular suture and Dermabond.  The nylon sutures of the incision from 11 days ago were removed and the wound completely fell apart. Therefore, this wound was re-approximated using deep sutures of 3-0 vicryl and superficial 4-0 vicryl as well and was dressed with dermabond. The patient tolerated the procedure well.  Sponge and instrument counts were correct.    FINDINGS: Gross margins on lumpectomy margin showed the closest margin to the tumor to be likely inferior at ~5 mm (difficult to interpret due to presence of hematoma and induration. This margin was re-excised. Three sentinel lymph node excised.     Lali Pope MD

## 2020-03-04 NOTE — ANESTHESIA PREPROCEDURE EVALUATION
Anesthesia Pre-Procedure Evaluation    Patient: Kayley Putnam   MRN: [id number] : 1933          Preoperative Diagnosis: Malignant neoplasm of right female breast, unspecified estrogen receptor status, unspecified site of breast (H) [C50.911]    Procedure(s):  RIGHT BREAST SEED LOCALIZED LUMPECTOMY WITH RIGHT SENTINEL NODE BIOPSY    Past Medical History:   Diagnosis Date     AF (atrial fibrillation) (H)      Atherosclerosis of aorta (H)      CAD (coronary artery disease)      History of blood transfusion 2020    after and I&D - blood clot in breast after a biopsy     HTN (hypertension)      Hyperlipidemia      Myelodysplastic syndrome (H)      Overweight and obesity(278.0)      PONV (postoperative nausea and vomiting)      Past Surgical History:   Procedure Laterality Date     APPENDECTOMY       ENT SURGERY      tonsillectomy and adenoidectomy as a child     GYN SURGERY      total hysterectomy      IRRIGATION AND DEBRIDEMENT BREAST Right 2020    Procedure: IRRIGATION AND DEBRIDEMENT, BREAST;  Surgeon: Avery Gloria MD;  Location: [facility] OR     IRRIGATION AND DEBRIDEMENT BREAST Right 2020    Procedure: IRRIGATION AND DEBRIDEMENT, BREAST;  Surgeon: Avery Gloria MD;  Location: [facility] OR     ORTHOPEDIC SURGERY  ,     has had both knees replaced     Anesthesia Evaluation     . Pt has had prior anesthetic.     History of anesthetic complications   - PONV        ROS/MED HX    ENT/Pulmonary:      (-) sleep apnea   Neurologic:       Cardiovascular:     (+) Dyslipidemia, hypertension-Peripheral Vascular Disease---. Taking blood thinners : . . . :. dysrhythmias a-fib, .       METS/Exercise Tolerance:     Hematologic:     (+) Other Hematologic Disorder-MDS      Musculoskeletal:         GI/Hepatic:         Renal/Genitourinary:         Endo:     (+) Obesity, .      Psychiatric:         Infectious Disease:         Malignancy:         Other:                          Physical Exam      Airway  "  Mallampati: II  TM distance: >3 FB  Neck ROM: full    Dental     Cardiovascular   Rhythm and rate: regular and normal      Pulmonary    breath sounds clear to auscultation            Lab Results   Component Value Date    WBC 1.9 (L) 02/24/2020    HGB 8.1 (L) 02/24/2020    HCT 25.4 (L) 02/24/2020    PLT 74 (L) 02/24/2020     02/24/2020    POTASSIUM 4.2 02/24/2020    CHLORIDE 105 02/24/2020    CO2 26 02/24/2020    BUN 22 02/24/2020    CR 0.97 02/24/2020    GLC 97 02/24/2020    NATALIA 8.5 02/24/2020    ALT 14 06/19/2014    AST 13 01/19/2015    ALKPHOS 76 01/19/2015    BILITOTAL 0.7 01/19/2015    PTT 33 04/06/2009    INR 1.29 (H) 02/23/2020       Preop Vitals  BP Readings from Last 3 Encounters:   03/04/20 (!) 182/71   02/26/20 (!) 142/42   02/24/20 (!) 126/35    Pulse Readings from Last 3 Encounters:   02/26/20 67   02/23/20 59   02/21/20 72      Resp Readings from Last 3 Encounters:   03/04/20 16   02/26/20 16   02/24/20 16    SpO2 Readings from Last 3 Encounters:   03/04/20 99%   02/26/20 96%   02/24/20 97%      Temp Readings from Last 1 Encounters:   03/04/20 97.7  F (36.5  C) (Temporal)    Ht Readings from Last 1 Encounters:   03/04/20 1.638 m (5' 4.49\")      Wt Readings from Last 1 Encounters:   03/04/20 71.7 kg (158 lb)    Estimated body mass index is 26.71 kg/m  as calculated from the following:    Height as of this encounter: 1.638 m (5' 4.49\").    Weight as of this encounter: 71.7 kg (158 lb).       Anesthesia Plan      History & Physical Review  History and physical reviewed and following examination; no interval change.    ASA Status:  3 .    NPO Status:  > 8 hours    Plan for General and LMA with Intravenous and Propofol induction. Maintenance will be Balanced.    PONV prophylaxis:  Ondansetron (or other 5HT-3) and Dexamethasone or Solumedrol  Will need type and screen with hgb approx. 8 mg/dl and plt count of 74k      Postoperative Care  Postoperative pain management:  Multi-modal analgesia, Oral pain " medications and IV analgesics.      Consents  Anesthetic plan, risks, benefits and alternatives discussed with:  Patient..                 Jas Soto MD                    .

## 2020-03-04 NOTE — ANESTHESIA CARE TRANSFER NOTE
Patient: Kayley Putnam    Procedure(s):  RIGHT BREAST SEED LOCALIZED LUMPECTOMY WITH RIGHT SENTINEL NODE BIOPSY    Diagnosis: Malignant neoplasm of right female breast, unspecified estrogen receptor status, unspecified site of breast (H) [C50.911]  Diagnosis Additional Information: No value filed.    Anesthesia Type:   General, LMA     Note:  Airway :Face Mask  Patient transferred to:PACU  Handoff Report: Identifed the Patient, Identified the Reponsible Provider, Reviewed the pertinent medical history, Discussed the surgical course, Reviewed Intra-OP anesthesia mangement and issues during anesthesia, Set expectations for post-procedure period and Allowed opportunity for questions and acknowledgement of understanding      Vitals: (Last set prior to Anesthesia Care Transfer)    CRNA VITALS  3/4/2020 1218 - 3/4/2020 1254      3/4/2020             Pulse:  62    SpO2:  95 %                Electronically Signed By: SUPRIYA Monahan CRNA  March 4, 2020  12:54 PM

## 2020-03-04 NOTE — DISCHARGE INSTRUCTIONS
GENERAL ANESTHESIA OR SEDATION ADULT DISCHARGE INSTRUCTIONS   SPECIAL PRECAUTIONS FOR 24 HOURS AFTER SURGERY    IT IS NOT UNUSUAL TO FEEL LIGHT-HEADED OR FAINT, UP TO 24 HOURS AFTER SURGERY OR WHILE TAKING PAIN MEDICATION.  IF YOU HAVE THESE SYMPTOMS; SIT FOR A FEW MINUTES BEFORE STANDING AND HAVE SOMEONE ASSIST YOU WHEN YOU GET UP TO WALK OR USE THE BATHROOM.    YOU SHOULD REST AND RELAX FOR THE NEXT 24 HOURS AND YOU MUST MAKE ARRANGEMENTS TO HAVE SOMEONE STAY WITH YOU FOR AT LEAST 24 HOURS AFTER YOUR DISCHARGE.  AVOID HAZARDOUS AND STRENUOUS ACTIVITIES.  DO NOT MAKE IMPORTANT DECISIONS FOR 24 HOURS.    DO NOT DRIVE ANY VEHICLE OR OPERATE MECHANICAL EQUIPMENT FOR 24 HOURS FOLLOWING THE END OF YOUR SURGERY.  EVEN THOUGH YOU MAY FEEL NORMAL, YOUR REACTIONS MAY BE AFFECTED BY THE MEDICATION YOU HAVE RECEIVED.    DO NOT DRINK ALCOHOLIC BEVERAGES FOR 24 HOURS FOLLOWING YOUR SURGERY.    DRINK CLEAR LIQUIDS (APPLE JUICE, GINGER ALE, 7-UP, BROTH, ETC.).  PROGRESS TO YOUR REGULAR DIET AS YOU FEEL ABLE.    YOU MAY HAVE A DRY MOUTH, A SORE THROAT, MUSCLES ACHES OR TROUBLE SLEEPING.  THESE SHOULD GO AWAY AFTER 24 HOURS.    CALL YOUR DOCTOR FOR ANY OF THE FOLLOWING:  SIGNS OF INFECTION (FEVER, GROWING TENDERNESS AT THE SURGERY SITE, A LARGE AMOUNT OF DRAINAGE OR BLEEDING, SEVERE PAIN, FOUL-SMELLING DRAINAGE, REDNESS OR SWELLING.    IT HAS BEEN OVER 8 TO 10 HOURS SINCE SURGERY AND YOU ARE STILL NOT ABLE TO URINATE (PASS WATER).     Transderm Scop (Scopolamine) Patch    The Transderm Scop patch placed behind your ear helps to prevent nausea and vomiting associated with the use of anesthesia and certain analgesics used during or after many types surgery.  You will need to remove this patch after 3 days.  However, it may be removed sooner if you are no longer concerned about nausea or vomiting.      The most common side effects:  ?  dryness of the mouth  ?  drowsiness  ?  blurred vision  ?  dilation of pupils  ?  disorientation,  memory disturbances and/or confusion  ?  dizziness  ?  restlessness  ?  hallucinations  ?  difficulty urinating  ?  skin rash  ?  red or painful eyes    Avoid drinking alcohol while using Transderm Scop patch.  Be careful about driving or operating any machinery while using this medication since it may make you drowsy.  In the unlikely event that you experience pain in the eye, which may be accompanied by widening of the pupil, eye redness and blurred vision, remove the Transderm Scop Patch immediately and consult your doctor.    Removal of Transderm Scop Patch:  To remove the patch simply peel it off your skin.  Be sure to wash your hands and the area behind your ear thoroughly with soap and water.  The Transderm Scop Patch will continue to have some active ingredient after use.  Therefore, to avoid accidental contact or ingestion by children or pets, fold the used patch in half with the sticky side together and dispose in the trash out of reach of children and pets.  If you wear contact lens, be sure to wash your hands first before handling contact lens.      Removal date:___3/7________.

## 2020-03-04 NOTE — PROGRESS NOTES
Injected 530 uCi (microcuries) 99mTc-LYMPHOSEEK in the RIGHT Breast at the 10 o'clock position above the areola INTRADERMALLY for [place] Node Biopsy. Injection completed @ 0920am. WAM

## 2020-03-04 NOTE — PROGRESS NOTES
SBAR Seed Localization    SITUATION:  Patient to breast imaging center for imaging guided seed localizations before breast lumpectomy or excision biopsy with sentinel node injection.    BACKGROUND:  Breast imaging cancer, breast abnormality  Ordered procedure completed: Yes  Special needs identified: No     ASSESSMENT:  SBAR report called to patient care unit because of unexpected event in radiology: No  Allergies and medication list reviewed prior to procedure. Yes  Skin cleansed with ChloraPrep One-Step.  Anesthesia: approximately 5ml of 1% Lidocaine injection subcutaneous before seed insertion administered by the radiologist.   Gauze dressing over insertion site(s).  Post procedure mammogram completed: Yes    Patient tolerance: patient tolerated right breast ultrasound guided radioactive seed localization well.    RECOMMENDATIONS:  Patient transferred to Same Day Surgery in stable condition via wheelchair with Breast Imaging Staff.    Please call [facility] [phone] if there are any questions.

## 2020-03-05 LAB — COPATH REPORT: NORMAL

## 2020-03-20 ENCOUNTER — OFFICE VISIT (OUTPATIENT)
Dept: SURGERY | Facility: CLINIC | Age: 85
End: 2020-03-20
Payer: MEDICARE

## 2020-03-20 VITALS
HEIGHT: 65 IN | SYSTOLIC BLOOD PRESSURE: 114 MMHG | OXYGEN SATURATION: 100 % | WEIGHT: 158 LBS | BODY MASS INDEX: 26.33 KG/M2 | DIASTOLIC BLOOD PRESSURE: 60 MMHG | HEART RATE: 59 BPM | RESPIRATION RATE: 16 BRPM

## 2020-03-20 DIAGNOSIS — Z09 SURGICAL FOLLOWUP VISIT: Primary | ICD-10-CM

## 2020-03-20 PROCEDURE — 99024 POSTOP FOLLOW-UP VISIT: CPT | Performed by: SURGERY

## 2020-03-20 ASSESSMENT — MIFFLIN-ST. JEOR: SCORE: 1149.62

## 2020-03-20 NOTE — PROGRESS NOTES
"Subjective:  Kayley is here for her first postoperative visit. She underwent right seed localized lumpectomy and sentinel lymph node biopsy on 3/4/20. Of note, prior to her operation, she had significant ecchymosis and hematoma from her core needle breast biopsies and underwent operative evacuation of a hematoma by my partner Dr. Gloria several days before her lumpectomy.  Today she  tells me she is doing quite well. She currently has no pain. She continues to have evidence of hematoma within the breast, which feels \"heavy\". This is slowly improving.     Objective:  Breast - Moderate ecchymosis, large amount of induration of the lateral right breast. Breast incisions x 2 intact without separation, skin ischemia, or drainage.   Axilla- palpable seroma or hematoma at the site of her SLN biopsy. Skin incision intact minimal ecchymosis without separation, skin ischemia, or drainage.     Pathology was reviewed with Ms. Putnam. IDC, 1.2 cm, negative margins, negative sentinel lymph nodes x 3    Assessment/Plan:  Doing well following lumpectomy and sentinel lymph node biopsy. I suspect the swelling/hematoma will take several weeks if not months to fully resolve and this was discussed with the patient. She will monitor for evidence of infection during this time. Currently, her incisions are healing well.   Follow up with oncology (scheduled for next week)   Follow up with radiation oncology  RTC PRN    Lali Pope MD      Please route or send letter to:  Dr. Vince Gordillo    "

## 2020-03-20 NOTE — LETTER
"2020       Re: Kayley Putnam - 1933    Kayley is here for her first postoperative visit. She underwent right seed localized lumpectomy and sentinel lymph node biopsy on 3/4/20. Of note, prior to her operation, she had significant ecchymosis and hematoma from her core needle breast biopsies and underwent operative evacuation of a hematoma by my partner Dr. Gloria several days before her lumpectomy.  Today she  tells me she is doing quite well. She currently has no pain. She continues to have evidence of hematoma within the breast, which feels \"heavy\". This is slowly improving.      Objective:  Breast - Moderate ecchymosis, large amount of induration of the lateral right breast. Breast incisions x 2 intact without separation, skin ischemia, or drainage.   Axilla- palpable seroma or hematoma at the site of her SLN biopsy. Skin incision intact minimal ecchymosis without separation, skin ischemia, or drainage.      Pathology was reviewed with MsJonh Putnam. IDC, 1.2 cm, negative margins, negative sentinel lymph nodes x 3     Assessment/Plan:  Doing well following lumpectomy and sentinel lymph node biopsy. I suspect the swelling/hematoma will take several weeks if not months to fully resolve and this was discussed with the patient. She will monitor for evidence of infection during this time. Currently, her incisions are healing well.   Follow up with oncology (scheduled for next week)   Follow up with radiation oncology  RTC PRN     Lali Pope MD  "

## 2020-03-23 LAB
FUNGUS SPEC CULT: NORMAL
Lab: NORMAL
SPECIMEN SOURCE: NORMAL

## 2021-02-03 ENCOUNTER — ANCILLARY PROCEDURE (OUTPATIENT)
Dept: BONE DENSITY | Facility: CLINIC | Age: 86
End: 2021-02-03
Attending: INTERNAL MEDICINE
Payer: MEDICARE

## 2021-02-03 ENCOUNTER — HOSPITAL ENCOUNTER (OUTPATIENT)
Dept: MAMMOGRAPHY | Facility: CLINIC | Age: 86
Discharge: HOME | End: 2021-02-03
Attending: INTERNAL MEDICINE | Admitting: INTERNAL MEDICINE
Payer: MEDICARE

## 2021-02-03 DIAGNOSIS — Z12.31 VISIT FOR SCREENING MAMMOGRAM: ICD-10-CM

## 2021-02-03 DIAGNOSIS — D46.9 MYELODYSPLASTIC SYNDROME (H): ICD-10-CM

## 2021-02-03 PROCEDURE — 77080 DXA BONE DENSITY AXIAL: CPT | Mod: GA | Performed by: INTERNAL MEDICINE

## 2021-02-03 PROCEDURE — 77063 BREAST TOMOSYNTHESIS BI: CPT

## 2022-03-01 ENCOUNTER — HOSPITAL ENCOUNTER (OUTPATIENT)
Dept: MAMMOGRAPHY | Facility: CLINIC | Age: 87
Discharge: HOME | End: 2022-03-01
Attending: INTERNAL MEDICINE | Admitting: INTERNAL MEDICINE
Payer: MEDICARE

## 2022-03-01 DIAGNOSIS — Z12.31 VISIT FOR SCREENING MAMMOGRAM: ICD-10-CM

## 2022-03-01 PROCEDURE — 77067 SCR MAMMO BI INCL CAD: CPT

## 2022-04-04 ENCOUNTER — TRANSFERRED RECORDS (OUTPATIENT)
Dept: INTERVENTIONAL RADIOLOGY/VASCULAR | Facility: CLINIC | Age: 87
End: 2022-04-04
Payer: MEDICARE

## 2022-05-16 DIAGNOSIS — Z11.59 ENCOUNTER FOR SCREENING FOR OTHER VIRAL DISEASES: Primary | ICD-10-CM

## 2022-05-20 ENCOUNTER — LAB (OUTPATIENT)
Dept: LAB | Facility: CLINIC | Age: 87
End: 2022-05-20
Attending: RADIOLOGY
Payer: MEDICARE

## 2022-05-20 DIAGNOSIS — Z11.59 ENCOUNTER FOR SCREENING FOR OTHER VIRAL DISEASES: ICD-10-CM

## 2022-05-20 PROCEDURE — U0003 INFECTIOUS AGENT DETECTION BY NUCLEIC ACID (DNA OR RNA); SEVERE ACUTE RESPIRATORY SYNDROME CORONAVIRUS 2 (SARS-COV-2) (CORONAVIRUS DISEASE [COVID-19]), AMPLIFIED PROBE TECHNIQUE, MAKING USE OF HIGH THROUGHPUT TECHNOLOGIES AS DESCRIBED BY CMS-2020-01-R: HCPCS

## 2022-05-21 LAB — SARS-COV-2 RNA RESP QL NAA+PROBE: NEGATIVE

## 2022-05-24 ENCOUNTER — HOSPITAL ENCOUNTER (OUTPATIENT)
Facility: CLINIC | Age: 87
Discharge: HOME | End: 2022-05-24
Admitting: RADIOLOGY
Payer: MEDICARE

## 2022-05-24 ENCOUNTER — APPOINTMENT (OUTPATIENT)
Dept: INTERVENTIONAL RADIOLOGY/VASCULAR | Facility: CLINIC | Age: 87
End: 2022-05-24
Attending: INTERNAL MEDICINE
Payer: MEDICARE

## 2022-05-24 VITALS
OXYGEN SATURATION: 100 % | SYSTOLIC BLOOD PRESSURE: 156 MMHG | HEIGHT: 65 IN | RESPIRATION RATE: 16 BRPM | TEMPERATURE: 97.7 F | BODY MASS INDEX: 24.49 KG/M2 | WEIGHT: 147 LBS | DIASTOLIC BLOOD PRESSURE: 55 MMHG | HEART RATE: 59 BPM

## 2022-05-24 DIAGNOSIS — D46.9 MYELODYSPLASTIC SYNDROME (H): ICD-10-CM

## 2022-05-24 LAB
ERYTHROCYTE [DISTWIDTH] IN BLOOD BY AUTOMATED COUNT: 14.5 % (ref 10–15)
HCT VFR BLD AUTO: 25.2 % (ref 35–47)
HGB BLD-MCNC: 8.3 G/DL (ref 11.7–15.7)
INR PPP: 1.4 (ref 0.85–1.15)
MCH RBC QN AUTO: 41.1 PG (ref 26.5–33)
MCHC RBC AUTO-ENTMCNC: 32.9 G/DL (ref 31.5–36.5)
MCV RBC AUTO: 125 FL (ref 78–100)
PLATELET # BLD AUTO: 64 10E3/UL (ref 150–450)
RBC # BLD AUTO: 2.02 10E6/UL (ref 3.8–5.2)
WBC # BLD AUTO: 2.1 10E3/UL (ref 4–11)

## 2022-05-24 PROCEDURE — 272N000504 HC NEEDLE CR4

## 2022-05-24 PROCEDURE — 250N000011 HC RX IP 250 OP 636

## 2022-05-24 PROCEDURE — 85027 COMPLETE CBC AUTOMATED: CPT | Performed by: NURSE PRACTITIONER

## 2022-05-24 PROCEDURE — 76937 US GUIDE VASCULAR ACCESS: CPT

## 2022-05-24 PROCEDURE — 36415 COLL VENOUS BLD VENIPUNCTURE: CPT | Performed by: NURSE PRACTITIONER

## 2022-05-24 PROCEDURE — 272N000604 HC WOUND GLUE CR3

## 2022-05-24 PROCEDURE — 36561 INSERT TUNNELED CV CATH: CPT

## 2022-05-24 PROCEDURE — 77001 FLUOROGUIDE FOR VEIN DEVICE: CPT

## 2022-05-24 PROCEDURE — 250N000009 HC RX 250

## 2022-05-24 PROCEDURE — C1788 PORT, INDWELLING, IMP: HCPCS

## 2022-05-24 PROCEDURE — 250N000011 HC RX IP 250 OP 636: Performed by: NURSE PRACTITIONER

## 2022-05-24 PROCEDURE — 85610 PROTHROMBIN TIME: CPT | Performed by: NURSE PRACTITIONER

## 2022-05-24 PROCEDURE — 99152 MOD SED SAME PHYS/QHP 5/>YRS: CPT

## 2022-05-24 PROCEDURE — 250N000009 HC RX 250: Performed by: NURSE PRACTITIONER

## 2022-05-24 RX ORDER — LIDOCAINE HYDROCHLORIDE 10 MG/ML
INJECTION, SOLUTION EPIDURAL; INFILTRATION; INTRACAUDAL; PERINEURAL
Status: DISCONTINUED
Start: 2022-05-24 | End: 2022-05-24 | Stop reason: HOSPADM

## 2022-05-24 RX ORDER — FENTANYL CITRATE 50 UG/ML
25-50 INJECTION, SOLUTION INTRAMUSCULAR; INTRAVENOUS EVERY 5 MIN PRN
Status: DISCONTINUED | OUTPATIENT
Start: 2022-05-24 | End: 2022-05-24 | Stop reason: HOSPADM

## 2022-05-24 RX ORDER — ACETAMINOPHEN 325 MG/1
650 TABLET ORAL
Status: DISCONTINUED | OUTPATIENT
Start: 2022-05-24 | End: 2022-05-24 | Stop reason: HOSPADM

## 2022-05-24 RX ORDER — LIDOCAINE HYDROCHLORIDE AND EPINEPHRINE 10; 10 MG/ML; UG/ML
INJECTION, SOLUTION INFILTRATION; PERINEURAL
Status: COMPLETED
Start: 2022-05-24 | End: 2022-05-24

## 2022-05-24 RX ORDER — FLUMAZENIL 0.1 MG/ML
0.2 INJECTION, SOLUTION INTRAVENOUS
Status: DISCONTINUED | OUTPATIENT
Start: 2022-05-24 | End: 2022-05-24 | Stop reason: HOSPADM

## 2022-05-24 RX ORDER — FENTANYL CITRATE 50 UG/ML
INJECTION, SOLUTION INTRAMUSCULAR; INTRAVENOUS
Status: DISCONTINUED
Start: 2022-05-24 | End: 2022-05-24 | Stop reason: HOSPADM

## 2022-05-24 RX ORDER — NALOXONE HYDROCHLORIDE 0.4 MG/ML
0.2 INJECTION, SOLUTION INTRAMUSCULAR; INTRAVENOUS; SUBCUTANEOUS
Status: DISCONTINUED | OUTPATIENT
Start: 2022-05-24 | End: 2022-05-24 | Stop reason: HOSPADM

## 2022-05-24 RX ORDER — ONDANSETRON 2 MG/ML
INJECTION INTRAMUSCULAR; INTRAVENOUS
Status: COMPLETED
Start: 2022-05-24 | End: 2022-05-24

## 2022-05-24 RX ORDER — CEFAZOLIN SODIUM 2 G/100ML
INJECTION, SOLUTION INTRAVENOUS
Status: DISCONTINUED
Start: 2022-05-24 | End: 2022-05-24 | Stop reason: HOSPADM

## 2022-05-24 RX ORDER — NALOXONE HYDROCHLORIDE 0.4 MG/ML
0.4 INJECTION, SOLUTION INTRAMUSCULAR; INTRAVENOUS; SUBCUTANEOUS
Status: DISCONTINUED | OUTPATIENT
Start: 2022-05-24 | End: 2022-05-24 | Stop reason: HOSPADM

## 2022-05-24 RX ORDER — HEPARIN SODIUM (PORCINE) LOCK FLUSH IV SOLN 100 UNIT/ML 100 UNIT/ML
SOLUTION INTRAVENOUS
Status: DISCONTINUED
Start: 2022-05-24 | End: 2022-05-24 | Stop reason: HOSPADM

## 2022-05-24 RX ORDER — CLINDAMYCIN PHOSPHATE 900 MG/50ML
INJECTION, SOLUTION INTRAVENOUS
Status: COMPLETED
Start: 2022-05-24 | End: 2022-05-24

## 2022-05-24 RX ADMIN — CLINDAMYCIN PHOSPHATE 900 MG: 900 INJECTION, SOLUTION INTRAVENOUS at 10:52

## 2022-05-24 RX ADMIN — ONDANSETRON 4 MG: 2 INJECTION INTRAMUSCULAR; INTRAVENOUS at 10:52

## 2022-05-24 RX ADMIN — LIDOCAINE HYDROCHLORIDE,EPINEPHRINE BITARTRATE 10 ML: 10; .01 INJECTION, SOLUTION INFILTRATION; PERINEURAL at 11:09

## 2022-05-24 RX ADMIN — FENTANYL CITRATE 50 MCG: 50 INJECTION INTRAMUSCULAR; INTRAVENOUS at 11:00

## 2022-05-24 RX ADMIN — MIDAZOLAM 1 MG: 1 INJECTION INTRAMUSCULAR; INTRAVENOUS at 11:00

## 2022-05-24 RX ADMIN — LIDOCAINE HYDROCHLORIDE 4 ML: 10 INJECTION, SOLUTION EPIDURAL; INFILTRATION; INTRACAUDAL; PERINEURAL at 11:10

## 2022-05-24 NOTE — DISCHARGE INSTRUCTIONS
Going Home after Your Port Is Inserted  _______________________________________    Patient Name: Kayley Putnam  Today's Date: May 24, 2022    The doctor who inserted your port was:  Dr. Oro at [facility])      Have your port site and/or neck wound checked on: Next time you see your doctor.     Your port may be accessed right away. A nurse needs to flush your port every 30 days or after each use.     When you get home:  You should have an adult with you for the first 24 hours.  No driving or drinking alcohol until tomorrow. You may still have side effects from the medicine you received today (you may feel drowsy, unsteady or forgetful).   You may go back to your regular diet today.   If you take aspirin or Plavix, you may begin taking it again tomorrow. You may restart all other medicines today. Use pain medicine as directed.  Avoid heavy lifting or the overuse of your shoulder for three days.  Caring for the port site and/or neck wound:  Keep the port site clean and dry. Cover the site with plastic before taking a shower. Do this until the site has healed.   Keep the bandage on your port site for three days. Change it if it gets wet or dirty. After three days, you may use Band-Aids until the wound has healed.  For a neck wound, leave the tape on for three days. You may cover it with a Band-Aid for comfort.   If you have oozing or bleeding from the port site or from the cut in your neck:   Put direct pressure on the wound for 5 to 10 minutes with a gauze pad.  If you still have bleeding after 10 minutes, call your doctor.  If you are bleeding a lot and can't control it with direct pressure, call 911.    Call your doctor if you have:  Bleeding from a wound after 10 minutes of direct pressure.  Swelling in your neck or over your port site.  Signs of infection: a fever over 100 F (37.8 C) under the tongue; the site is red, tender or draining.

## 2022-05-24 NOTE — PROGRESS NOTES
VSS. PIV removed. Dressing CDI. A/O, tolerated PO, and able to ambulate. Discharge instructions and medications reviewed with patient and daughter. Patient left hospital in wheelchair with nursing staff, daughter provided transportation home.

## 2022-05-24 NOTE — PLAN OF CARE
Post Procedure Summary:  Prior to the start of the procedure and with procedural staff participation, I verbally confirmed the patient s identity using two indicators, relevant allergies, that the procedure was appropriate and matched the consent or emergent situation, and that the correct equipment/implants were available. Immediately prior to starting the procedure I conducted the Time Out with the procedural staff and re-confirmed the patient s name, procedure, and site/side. (The Joint Commission universal protocol was followed.)  Yes       Sedatives: Fentanyl and Midazolam (Versed)    Vital signs, airway and pulse oximetry were monitored and remained stable throughout the procedure and sedation was maintained until the procedure was complete.  The patient was monitored by staff until sedation discharge criteria were met.    Patient tolerance: Patient tolerated the procedure well with no immediate complications.    Time of sedation in minutes: 15 Minutes minutes from beginning to end of physician one to one monitoring.    Goal Outcome Evaluation:

## 2022-05-24 NOTE — PROCEDURES
[facility]    Procedure: Left ij port placement    Date/Time: 5/24/2022 12:09 PM  Performed by: Hilda Oro DO  Authorized by: Hilda Oro DO       UNIVERSAL PROTOCOL   Site Marked: Yes  Prior Images Obtained and Reviewed:  Yes  Required items: Required blood products, implants, devices and special equipment available    Patient identity confirmed:  Verbally with patient, arm band, provided demographic data and hospital-assigned identification number  Patient was reevaluated immediately before administering moderate or deep sedation or anesthesia  Confirmation Checklist:  Patient's identity using two indicators, relevant allergies, procedure was appropriate and matched the consent or emergent situation and correct equipment/implants were available  Time out: Immediately prior to the procedure a time out was called    Universal Protocol: the Joint Commission Universal Protocol was followed    Preparation: Patient was prepped and draped in usual sterile fashion       ANESTHESIA    Anesthesia: Local infiltration  Local Anesthetic:  Lidocaine 1% without epinephrine      SEDATION  Patient Sedated: Yes    Sedation Type:  Moderate (conscious) sedation  Vital signs: Vital signs monitored during sedation    See dictated procedure note for full details.  Findings: Left internal jugular port placement.     Specimens: none    Complications: None    Condition: Stable    Plan: Ok to use.      PROCEDURE    Patient Tolerance:  Patient tolerated the procedure well with no immediate complications  Length of time physician/provider present for 1:1 monitoring during sedation: 20

## 2022-05-24 NOTE — IP AVS SNAPSHOT
[facility] Interventional Radiology  [address] [facility] [address]  Phone: [phone]  Fax: [phone]                                      After Visit Summary   5/24/2022    Kayley Putnam   MRN: [id number]           After Visit Summary Signature Page    I have received my discharge instructions, and my questions have been answered. I have discussed any challenges I see with this plan with the nurse or doctor.    ..........................................................................................................................................  Patient/Patient Representative Signature      ..........................................................................................................................................  Patient Representative Print Name and Relationship to Patient    ..................................................               ................................................  Date                                   Time    ..........................................................................................................................................  Reviewed by Signature/Title    ...................................................              ..............................................  Date                                               Time          22EPIC Rev 08/18

## 2022-05-24 NOTE — PRE-PROCEDURE
GENERAL PRE-PROCEDURE:   Procedure:  Port placement  Date/Time:  5/24/2022 10:08 AM    Verbal consent obtained?: Yes    Written consent obtained?: Yes    Risks and benefits: Risks, benefits and alternatives were discussed    Consent given by:  Patient  Patient states understanding of procedure being performed: Yes    Patient's understanding of procedure matches consent: Yes    Procedure consent matches procedure scheduled: Yes    Expected level of sedation:  Moderate  Appropriately NPO:  Yes  ASA Class:  2  Mallampati  :  Grade 2- soft palate, base of uvula, tonsillar pillars, and portion of posterior pharyngeal wall visible  Lungs:  Lungs clear with good breath sounds bilaterally  Heart:  Normal heart sounds and rate  History & Physical reviewed:  History and physical reviewed and no updates needed  Statement of review:  I have reviewed the lab findings, diagnostic data, medications, and the plan for sedation

## 2023-01-01 NOTE — PLAN OF CARE
Problem: Patient Care Overview  Goal: Discharge Needs Assessment  Outcome: Improving  Problem: Patient Care Overview  Goal: Discharge Needs Assessment  Outcome: Improving  PRIMARY DIAGNOSIS: GASTROENTERITIS     OUTPATIENT/OBSERVATION GOALS TO BE MET BEFORE DISCHARGE  1. Orthostatic performed: N/A     2. Tolerating PO fluid and/or antibiotics (if applicable): Yes     3. Nausea/Vomiting/Diarrhea symptoms improved: Still stooling but have decreased- sent down for a c-diff     4. Pain status: Denies pain     5. Return to near baseline physical activity: Yes     Discharge Planner Nurse   Safe discharge environment identified: Yes  Barriers to discharge: No       Entered by: Kailey Orlando 03/16/2018 12:00pm    Please review provider order for any additional goals.   Nurse to notify provider when observation goals have been met and patient is ready for discharge.     Alert and oriented x4. VSS except temp of 100, patient is denying tylenol, but educated on use of IS and will continue to monitor. Patient denies feeling feverish and will take tylenol at home if needed. Per MD patient okay to discharge home and will call her with results of C-Diff if positive, enteric negative. Patient denies pain, and states her stomach feels better. Patient up SBA. Patient amount of stool has decreased during day shift and patient feels comfortable going home. Patient is tolerating regular diet and fluids. Will await discharge orders.       Zaid LEROY

## 2023-03-21 ENCOUNTER — HOSPITAL ENCOUNTER (OUTPATIENT)
Dept: MAMMOGRAPHY | Facility: CLINIC | Age: 88
Discharge: HOME | End: 2023-03-21
Attending: INTERNAL MEDICINE | Admitting: INTERNAL MEDICINE
Payer: MEDICARE

## 2023-03-21 DIAGNOSIS — Z12.31 SCREENING MAMMOGRAM FOR HIGH-RISK PATIENT: ICD-10-CM

## 2023-03-21 DIAGNOSIS — D46.1 MYELODYSPLASTIC SYNDROME WITH RING SIDEROBLASTS AND SINGLE LINEAGE DYSPLASIA (H): ICD-10-CM

## 2023-03-21 PROCEDURE — 77067 SCR MAMMO BI INCL CAD: CPT

## 2023-07-19 ENCOUNTER — TRANSFERRED RECORDS (OUTPATIENT)
Dept: HEALTH INFORMATION MANAGEMENT | Facility: CLINIC | Age: 88
End: 2023-07-19
Payer: MEDICARE

## 2023-07-19 ENCOUNTER — MEDICAL CORRESPONDENCE (OUTPATIENT)
Dept: HEALTH INFORMATION MANAGEMENT | Facility: CLINIC | Age: 88
End: 2023-07-19
Payer: MEDICARE

## 2023-07-24 PROBLEM — D46.9 MDS (MYELODYSPLASTIC SYNDROME) (H): Status: ACTIVE | Noted: 2023-07-24

## 2023-07-24 LAB
BLD PROD TYP BPU: NORMAL
BLOOD COMPONENT TYPE: NORMAL
CODING SYSTEM: NORMAL
CROSSMATCH: NORMAL
ISSUE DATE AND TIME: NORMAL
UNIT ABO/RH: NORMAL
UNIT NUMBER: NORMAL
UNIT STATUS: NORMAL
UNIT TYPE ISBT: 6200

## 2023-07-24 RX ORDER — HEPARIN SODIUM (PORCINE) LOCK FLUSH IV SOLN 100 UNIT/ML 100 UNIT/ML
5 SOLUTION INTRAVENOUS
Status: CANCELLED | OUTPATIENT
Start: 2023-07-24

## 2023-07-24 RX ORDER — HEPARIN SODIUM,PORCINE 10 UNIT/ML
5-20 VIAL (ML) INTRAVENOUS DAILY PRN
Status: CANCELLED | OUTPATIENT
Start: 2023-07-24

## 2023-07-25 LAB
ABO/RH(D): NORMAL
ANTIBODY SCREEN: NEGATIVE
SPECIMEN EXPIRATION DATE: NORMAL

## 2023-07-26 ENCOUNTER — LAB (OUTPATIENT)
Dept: LAB | Facility: CLINIC | Age: 88
End: 2023-07-26
Payer: MEDICARE

## 2023-07-26 DIAGNOSIS — D46.9 MDS (MYELODYSPLASTIC SYNDROME) (H): ICD-10-CM

## 2023-07-26 DIAGNOSIS — D64.9 ANEMIA: ICD-10-CM

## 2023-07-26 PROCEDURE — 86850 RBC ANTIBODY SCREEN: CPT

## 2023-07-26 PROCEDURE — 36415 COLL VENOUS BLD VENIPUNCTURE: CPT

## 2023-07-26 PROCEDURE — 86901 BLOOD TYPING SEROLOGIC RH(D): CPT

## 2023-07-27 ENCOUNTER — INFUSION THERAPY VISIT (OUTPATIENT)
Dept: INFUSION THERAPY | Facility: CLINIC | Age: 88
End: 2023-07-27
Attending: INTERNAL MEDICINE
Payer: MEDICARE

## 2023-07-27 VITALS
DIASTOLIC BLOOD PRESSURE: 68 MMHG | RESPIRATION RATE: 16 BRPM | HEART RATE: 50 BPM | SYSTOLIC BLOOD PRESSURE: 140 MMHG | OXYGEN SATURATION: 98 % | TEMPERATURE: 97.8 F

## 2023-07-27 DIAGNOSIS — D64.9 ANEMIA: ICD-10-CM

## 2023-07-27 DIAGNOSIS — D46.9 MDS (MYELODYSPLASTIC SYNDROME) (H): Primary | ICD-10-CM

## 2023-07-27 PROCEDURE — 250N000011 HC RX IP 250 OP 636: Mod: JZ | Performed by: INTERNAL MEDICINE

## 2023-07-27 PROCEDURE — P9016 RBC LEUKOCYTES REDUCED: HCPCS | Performed by: INTERNAL MEDICINE

## 2023-07-27 PROCEDURE — 86923 COMPATIBILITY TEST ELECTRIC: CPT | Performed by: INTERNAL MEDICINE

## 2023-07-27 PROCEDURE — 36430 TRANSFUSION BLD/BLD COMPNT: CPT

## 2023-07-27 RX ORDER — HEPARIN SODIUM (PORCINE) LOCK FLUSH IV SOLN 100 UNIT/ML 100 UNIT/ML
5 SOLUTION INTRAVENOUS
Status: DISCONTINUED | OUTPATIENT
Start: 2023-07-27 | End: 2023-07-27 | Stop reason: HOSPADM

## 2023-07-27 RX ADMIN — Medication 5 ML: at 10:11

## 2023-07-27 NOTE — PROGRESS NOTES
Infusion Nursing Note:  Kayley Putnam presents today for 1 unit PRBC transfusion.    Patient seen by provider today: No   present during visit today: Not Applicable.    Note: N/A.      Intravenous Access:  Implanted Port.    Treatment Conditions:  Blood transfusion consent signed 7/21/2023.  HGB: 7.2.      Post Infusion Assessment:  Patient tolerated infusion without incident.  Blood return noted pre and post infusion.  Site patent and intact, free from redness, edema or discomfort.  No evidence of extravasations.  Access discontinued per protocol.       Discharge Plan:   Discharge instructions reviewed with: Patient.  Patient and/or family verbalized understanding of discharge instructions and all questions answered.  Copy of AVS reviewed with patient and/or family.    Patient discharged in stable condition accompanied by: self and daughter.  Departure Mode: Ambulatory and walker.      Annamaria Cameron RN

## 2023-08-10 ENCOUNTER — MEDICAL CORRESPONDENCE (OUTPATIENT)
Dept: HEALTH INFORMATION MANAGEMENT | Facility: CLINIC | Age: 88
End: 2023-08-10

## 2023-08-29 ENCOUNTER — TRANSFERRED RECORDS (OUTPATIENT)
Dept: HEALTH INFORMATION MANAGEMENT | Facility: CLINIC | Age: 88
End: 2023-08-29
Payer: MEDICARE

## 2023-09-01 LAB
BLD PROD TYP BPU: NORMAL
BLOOD COMPONENT TYPE: NORMAL
CODING SYSTEM: NORMAL
UNIT ABO/RH: NORMAL
UNIT NUMBER: NORMAL
UNIT STATUS: NORMAL
UNIT TYPE ISBT: 6200

## 2023-09-01 RX ORDER — HEPARIN SODIUM,PORCINE 10 UNIT/ML
5-20 VIAL (ML) INTRAVENOUS DAILY PRN
Status: CANCELLED | OUTPATIENT
Start: 2023-09-01

## 2023-09-01 RX ORDER — HEPARIN SODIUM (PORCINE) LOCK FLUSH IV SOLN 100 UNIT/ML 100 UNIT/ML
5 SOLUTION INTRAVENOUS
Status: CANCELLED | OUTPATIENT
Start: 2023-09-01

## 2023-09-10 LAB
ABO/RH(D): NORMAL
ANTIBODY SCREEN: NEGATIVE
SPECIMEN EXPIRATION DATE: NORMAL

## 2023-09-11 ENCOUNTER — LAB (OUTPATIENT)
Dept: LAB | Facility: CLINIC | Age: 88
End: 2023-09-11
Payer: MEDICARE

## 2023-09-11 DIAGNOSIS — D64.9 ANEMIA: ICD-10-CM

## 2023-09-11 DIAGNOSIS — D46.9 MDS (MYELODYSPLASTIC SYNDROME) (H): ICD-10-CM

## 2023-09-11 PROCEDURE — 86901 BLOOD TYPING SEROLOGIC RH(D): CPT

## 2023-09-11 PROCEDURE — 86850 RBC ANTIBODY SCREEN: CPT

## 2023-09-11 PROCEDURE — 36415 COLL VENOUS BLD VENIPUNCTURE: CPT

## 2023-09-12 ENCOUNTER — INFUSION THERAPY VISIT (OUTPATIENT)
Dept: INFUSION THERAPY | Facility: CLINIC | Age: 88
End: 2023-09-12
Attending: INTERNAL MEDICINE
Payer: MEDICARE

## 2023-09-12 VITALS
SYSTOLIC BLOOD PRESSURE: 135 MMHG | HEART RATE: 67 BPM | TEMPERATURE: 97.3 F | OXYGEN SATURATION: 99 % | DIASTOLIC BLOOD PRESSURE: 61 MMHG

## 2023-09-12 DIAGNOSIS — D46.9 MDS (MYELODYSPLASTIC SYNDROME) (H): ICD-10-CM

## 2023-09-12 DIAGNOSIS — D64.9 ANEMIA: Primary | ICD-10-CM

## 2023-09-12 PROCEDURE — 250N000011 HC RX IP 250 OP 636: Performed by: INTERNAL MEDICINE

## 2023-09-12 PROCEDURE — 36430 TRANSFUSION BLD/BLD COMPNT: CPT

## 2023-09-12 PROCEDURE — 86923 COMPATIBILITY TEST ELECTRIC: CPT | Performed by: INTERNAL MEDICINE

## 2023-09-12 PROCEDURE — P9016 RBC LEUKOCYTES REDUCED: HCPCS | Performed by: INTERNAL MEDICINE

## 2023-09-12 RX ORDER — HEPARIN SODIUM (PORCINE) LOCK FLUSH IV SOLN 100 UNIT/ML 100 UNIT/ML
5 SOLUTION INTRAVENOUS
Status: DISCONTINUED | OUTPATIENT
Start: 2023-09-12 | End: 2023-09-12 | Stop reason: HOSPADM

## 2023-09-12 RX ADMIN — SODIUM CHLORIDE, PRESERVATIVE FREE 5 ML: 5 INJECTION INTRAVENOUS at 14:45

## 2023-09-12 NOTE — PROGRESS NOTES
Infusion Nursing Note:  Kayley Putnam presents today for 1U PRBC.    Patient seen by provider today: No   present during visit today: Not Applicable.    Note: NA      Intravenous Access:  Implanted Port.    Treatment Conditions:  Blood transfusion consent signed 8/29/23.  Hgb 7.2 on 8/29/23 at [facility].      Post Infusion Assessment:  Patient tolerated infusion without incident.  Blood return noted pre and post infusion.  Site patent and intact, free from redness, edema or discomfort.  No evidence of extravasations.  Access discontinued per protocol.       Discharge Plan:   AVS to patient via MYCHART.  Patient will return PRN for next appointment.   Patient discharged in stable condition accompanied by: daughter.  Departure Mode: Ambulatory.      Micki Briceno RN

## 2023-10-10 ENCOUNTER — TRANSFERRED RECORDS (OUTPATIENT)
Dept: HEALTH INFORMATION MANAGEMENT | Facility: CLINIC | Age: 88
End: 2023-10-10
Payer: MEDICARE

## 2023-10-10 ENCOUNTER — MEDICAL CORRESPONDENCE (OUTPATIENT)
Dept: HEALTH INFORMATION MANAGEMENT | Facility: CLINIC | Age: 88
End: 2023-10-10
Payer: MEDICARE

## 2023-10-23 LAB
ABO/RH(D): NORMAL
ANTIBODY SCREEN: NEGATIVE
SPECIMEN EXPIRATION DATE: NORMAL

## 2023-10-24 ENCOUNTER — LAB (OUTPATIENT)
Dept: LAB | Facility: CLINIC | Age: 88
End: 2023-10-24
Payer: MEDICARE

## 2023-10-24 DIAGNOSIS — D46.9 MDS (MYELODYSPLASTIC SYNDROME) (H): ICD-10-CM

## 2023-10-24 PROCEDURE — 36415 COLL VENOUS BLD VENIPUNCTURE: CPT

## 2023-10-24 PROCEDURE — 86901 BLOOD TYPING SEROLOGIC RH(D): CPT

## 2023-10-25 ENCOUNTER — INFUSION THERAPY VISIT (OUTPATIENT)
Dept: INFUSION THERAPY | Facility: CLINIC | Age: 88
End: 2023-10-25
Attending: INTERNAL MEDICINE
Payer: MEDICARE

## 2023-10-25 VITALS
SYSTOLIC BLOOD PRESSURE: 132 MMHG | HEART RATE: 60 BPM | DIASTOLIC BLOOD PRESSURE: 73 MMHG | RESPIRATION RATE: 16 BRPM | OXYGEN SATURATION: 99 % | TEMPERATURE: 97.9 F

## 2023-10-25 DIAGNOSIS — D46.9 MDS (MYELODYSPLASTIC SYNDROME) (H): Primary | ICD-10-CM

## 2023-10-25 PROCEDURE — 36430 TRANSFUSION BLD/BLD COMPNT: CPT

## 2023-10-25 PROCEDURE — 250N000011 HC RX IP 250 OP 636: Mod: JZ | Performed by: INTERNAL MEDICINE

## 2023-10-25 PROCEDURE — 36591 DRAW BLOOD OFF VENOUS DEVICE: CPT | Performed by: NURSE PRACTITIONER

## 2023-10-25 PROCEDURE — P9016 RBC LEUKOCYTES REDUCED: HCPCS | Performed by: INTERNAL MEDICINE

## 2023-10-25 PROCEDURE — 86923 COMPATIBILITY TEST ELECTRIC: CPT | Performed by: INTERNAL MEDICINE

## 2023-10-25 RX ORDER — HEPARIN SODIUM,PORCINE 10 UNIT/ML
5-20 VIAL (ML) INTRAVENOUS DAILY PRN
OUTPATIENT
Start: 2023-10-25

## 2023-10-25 RX ORDER — HEPARIN SODIUM,PORCINE 10 UNIT/ML
5-20 VIAL (ML) INTRAVENOUS DAILY PRN
Status: CANCELLED | OUTPATIENT
Start: 2023-10-25

## 2023-10-25 RX ORDER — HEPARIN SODIUM (PORCINE) LOCK FLUSH IV SOLN 100 UNIT/ML 100 UNIT/ML
5 SOLUTION INTRAVENOUS
OUTPATIENT
Start: 2023-10-25

## 2023-10-25 RX ORDER — HEPARIN SODIUM (PORCINE) LOCK FLUSH IV SOLN 100 UNIT/ML 100 UNIT/ML
5 SOLUTION INTRAVENOUS
Status: DISCONTINUED | OUTPATIENT
Start: 2023-10-25 | End: 2023-10-25 | Stop reason: HOSPADM

## 2023-10-25 RX ADMIN — Medication 5 ML: at 13:26

## 2023-10-25 NOTE — PROGRESS NOTES
Infusion Nursing Note:  Kayley Putnam presents today for 1UPRBCs.    Patient seen by provider today: No   present during visit today: Not Applicable.    Note: Blood consent signed 10/18/2023.      Intravenous Access:  Implanted Port.    Treatment Conditions:    HGB 7.7 at [facility] 10/10/23  Results reviewed, labs MET treatment parameters, ok to proceed with treatment.      Post Infusion Assessment:  Patient tolerated infusion without incident.  Blood return noted pre and post infusion.  Site patent and intact, free from redness, edema or discomfort.  No evidence of extravasations.  Access discontinued per protocol.       Discharge Plan:   Discharge instructions reviewed with: Patient and Family.  Patient and/or family verbalized understanding of discharge instructions and all questions answered.  Pt will follow up with provider PRN.  Patient discharged in stable condition accompanied by: daughter.  Departure Mode: Ambulatory.      Tati Darnell RN

## 2023-12-12 ENCOUNTER — TRANSFERRED RECORDS (OUTPATIENT)
Dept: HEALTH INFORMATION MANAGEMENT | Facility: CLINIC | Age: 88
End: 2023-12-12
Payer: MEDICARE

## 2023-12-12 ENCOUNTER — MEDICAL CORRESPONDENCE (OUTPATIENT)
Dept: HEALTH INFORMATION MANAGEMENT | Facility: CLINIC | Age: 88
End: 2023-12-12
Payer: MEDICARE

## 2023-12-13 LAB
ABO/RH(D): NORMAL
ANTIBODY SCREEN: NEGATIVE
BLD PROD TYP BPU: NORMAL
BLOOD COMPONENT TYPE: NORMAL
CODING SYSTEM: NORMAL
CROSSMATCH: NORMAL
ISSUE DATE AND TIME: NORMAL
SPECIMEN EXPIRATION DATE: NORMAL
UNIT ABO/RH: NORMAL
UNIT NUMBER: NORMAL
UNIT STATUS: NORMAL
UNIT TYPE ISBT: 6200

## 2023-12-13 RX ORDER — HEPARIN SODIUM,PORCINE 10 UNIT/ML
5-20 VIAL (ML) INTRAVENOUS DAILY PRN
Status: CANCELLED | OUTPATIENT
Start: 2023-12-13

## 2023-12-13 RX ORDER — HEPARIN SODIUM (PORCINE) LOCK FLUSH IV SOLN 100 UNIT/ML 100 UNIT/ML
5 SOLUTION INTRAVENOUS
Status: CANCELLED | OUTPATIENT
Start: 2023-12-13

## 2023-12-14 ENCOUNTER — LAB (OUTPATIENT)
Dept: LAB | Facility: CLINIC | Age: 88
End: 2023-12-14
Payer: MEDICARE

## 2023-12-14 DIAGNOSIS — D46.9 MDS (MYELODYSPLASTIC SYNDROME) (H): ICD-10-CM

## 2023-12-14 DIAGNOSIS — D64.9 ANEMIA: ICD-10-CM

## 2023-12-14 PROCEDURE — 36415 COLL VENOUS BLD VENIPUNCTURE: CPT

## 2023-12-14 PROCEDURE — 86850 RBC ANTIBODY SCREEN: CPT

## 2023-12-14 PROCEDURE — 86901 BLOOD TYPING SEROLOGIC RH(D): CPT

## 2023-12-15 ENCOUNTER — INFUSION THERAPY VISIT (OUTPATIENT)
Dept: INFUSION THERAPY | Facility: CLINIC | Age: 88
End: 2023-12-15
Attending: INTERNAL MEDICINE
Payer: MEDICARE

## 2023-12-15 VITALS
HEART RATE: 55 BPM | SYSTOLIC BLOOD PRESSURE: 151 MMHG | RESPIRATION RATE: 16 BRPM | OXYGEN SATURATION: 100 % | TEMPERATURE: 97.7 F | DIASTOLIC BLOOD PRESSURE: 49 MMHG

## 2023-12-15 DIAGNOSIS — D64.9 ANEMIA: Primary | ICD-10-CM

## 2023-12-15 DIAGNOSIS — D46.9 MDS (MYELODYSPLASTIC SYNDROME) (H): ICD-10-CM

## 2023-12-15 PROCEDURE — P9016 RBC LEUKOCYTES REDUCED: HCPCS | Performed by: INTERNAL MEDICINE

## 2023-12-15 PROCEDURE — 250N000011 HC RX IP 250 OP 636: Mod: JZ | Performed by: INTERNAL MEDICINE

## 2023-12-15 PROCEDURE — 86923 COMPATIBILITY TEST ELECTRIC: CPT | Performed by: INTERNAL MEDICINE

## 2023-12-15 PROCEDURE — 36430 TRANSFUSION BLD/BLD COMPNT: CPT

## 2023-12-15 RX ORDER — HEPARIN SODIUM (PORCINE) LOCK FLUSH IV SOLN 100 UNIT/ML 100 UNIT/ML
5 SOLUTION INTRAVENOUS
Status: DISCONTINUED | OUTPATIENT
Start: 2023-12-15 | End: 2023-12-15 | Stop reason: HOSPADM

## 2023-12-15 RX ADMIN — Medication 5 ML: at 14:20

## 2023-12-15 NOTE — PROGRESS NOTES
Infusion Nursing Note:  Kayley Putnam presents today for 1 unit PRBC's.    Patient seen by provider today: No   present during visit today: Not Applicable.    Note: transfusion started at 120 ml/hr for 10 minutes, increased to 200 ml/hr for remainder.       Intravenous Access:  Implanted Port.    Treatment Conditions:  Hemoglobin 7.1 on 12/12/23-[facility]  Blood transfusion consent signed 10/18/23.      Post Infusion Assessment:  Patient tolerated infusion without incident.  Blood return noted pre and post infusion.  Site patent and intact, free from redness, edema or discomfort.  No evidence of extravasations.  Access discontinued per protocol.       Discharge Plan:   Discharge instructions reviewed with: Patient.  Patient and/or family verbalized understanding of discharge instructions and all questions answered.  AVS to patient via ev-socialT.  Patient will follow up with [facility] as scheduled.  Patient discharged in stable condition accompanied by: self.  Departure Mode: Ambulatory with walker.      Kathrine Monahan RN

## 2024-01-26 ENCOUNTER — MEDICAL CORRESPONDENCE (OUTPATIENT)
Dept: HEALTH INFORMATION MANAGEMENT | Facility: CLINIC | Age: 89
End: 2024-01-26
Payer: MEDICARE

## 2024-01-26 ENCOUNTER — TRANSFERRED RECORDS (OUTPATIENT)
Dept: INFUSION THERAPY | Facility: CLINIC | Age: 89
End: 2024-01-26
Payer: MEDICARE

## 2024-01-26 DIAGNOSIS — D46.9 MDS (MYELODYSPLASTIC SYNDROME) (H): Primary | ICD-10-CM

## 2024-01-26 RX ORDER — HEPARIN SODIUM,PORCINE 10 UNIT/ML
5-20 VIAL (ML) INTRAVENOUS DAILY PRN
Status: CANCELLED | OUTPATIENT
Start: 2024-01-29

## 2024-01-26 RX ORDER — HEPARIN SODIUM (PORCINE) LOCK FLUSH IV SOLN 100 UNIT/ML 100 UNIT/ML
5 SOLUTION INTRAVENOUS
Status: CANCELLED | OUTPATIENT
Start: 2024-01-29

## 2024-01-26 RX ORDER — DIPHENHYDRAMINE HYDROCHLORIDE 50 MG/ML
50 INJECTION INTRAMUSCULAR; INTRAVENOUS
Status: CANCELLED
Start: 2024-01-29

## 2024-01-26 RX ORDER — EPINEPHRINE 1 MG/ML
0.3 INJECTION, SOLUTION INTRAMUSCULAR; SUBCUTANEOUS EVERY 5 MIN PRN
Status: CANCELLED | OUTPATIENT
Start: 2024-01-29

## 2024-01-27 LAB
ABO/RH(D): NORMAL
ANTIBODY SCREEN: NEGATIVE
SPECIMEN EXPIRATION DATE: NORMAL

## 2024-01-28 ENCOUNTER — LAB (OUTPATIENT)
Dept: LAB | Facility: CLINIC | Age: 89
End: 2024-01-28
Payer: MEDICARE

## 2024-01-28 DIAGNOSIS — D46.9 MDS (MYELODYSPLASTIC SYNDROME) (H): ICD-10-CM

## 2024-01-28 PROCEDURE — 86900 BLOOD TYPING SEROLOGIC ABO: CPT

## 2024-01-28 PROCEDURE — 36415 COLL VENOUS BLD VENIPUNCTURE: CPT

## 2024-01-28 RX ORDER — HEPARIN SODIUM (PORCINE) LOCK FLUSH IV SOLN 100 UNIT/ML 100 UNIT/ML
5 SOLUTION INTRAVENOUS
Status: CANCELLED | OUTPATIENT
Start: 2024-01-28

## 2024-01-28 RX ORDER — EPINEPHRINE 1 MG/ML
0.3 INJECTION, SOLUTION INTRAMUSCULAR; SUBCUTANEOUS EVERY 5 MIN PRN
OUTPATIENT
Start: 2024-01-28

## 2024-01-28 RX ORDER — DIPHENHYDRAMINE HYDROCHLORIDE 50 MG/ML
50 INJECTION INTRAMUSCULAR; INTRAVENOUS
Start: 2024-01-28

## 2024-01-28 RX ORDER — HEPARIN SODIUM,PORCINE 10 UNIT/ML
5-20 VIAL (ML) INTRAVENOUS DAILY PRN
OUTPATIENT
Start: 2024-01-28

## 2024-01-29 ENCOUNTER — INFUSION THERAPY VISIT (OUTPATIENT)
Dept: INFUSION THERAPY | Facility: CLINIC | Age: 89
End: 2024-01-29
Attending: NURSE PRACTITIONER
Payer: MEDICARE

## 2024-01-29 VITALS
RESPIRATION RATE: 18 BRPM | SYSTOLIC BLOOD PRESSURE: 162 MMHG | OXYGEN SATURATION: 99 % | TEMPERATURE: 97.7 F | DIASTOLIC BLOOD PRESSURE: 52 MMHG | HEART RATE: 52 BPM

## 2024-01-29 DIAGNOSIS — D64.9 ANEMIA: Primary | ICD-10-CM

## 2024-01-29 DIAGNOSIS — D46.9 MDS (MYELODYSPLASTIC SYNDROME) (H): ICD-10-CM

## 2024-01-29 PROCEDURE — P9016 RBC LEUKOCYTES REDUCED: HCPCS | Performed by: INTERNAL MEDICINE

## 2024-01-29 PROCEDURE — 86923 COMPATIBILITY TEST ELECTRIC: CPT | Performed by: INTERNAL MEDICINE

## 2024-01-29 PROCEDURE — 36430 TRANSFUSION BLD/BLD COMPNT: CPT

## 2024-01-29 PROCEDURE — 250N000011 HC RX IP 250 OP 636: Performed by: INTERNAL MEDICINE

## 2024-01-29 RX ORDER — HEPARIN SODIUM (PORCINE) LOCK FLUSH IV SOLN 100 UNIT/ML 100 UNIT/ML
5 SOLUTION INTRAVENOUS
Status: DISCONTINUED | OUTPATIENT
Start: 2024-01-29 | End: 2024-01-29 | Stop reason: HOSPADM

## 2024-01-29 RX ADMIN — Medication 5 ML: at 16:00

## 2024-01-29 ASSESSMENT — PAIN SCALES - GENERAL: PAINLEVEL: NO PAIN (0)

## 2024-01-29 NOTE — PROGRESS NOTES
Infusion Nursing Note:  Kayley Putnam presents today for 1 unit PRBC.    Patient seen by provider today: No   present during visit today: Not Applicable.    Note: N/A.      Intravenous Access:  Implanted Port.    Treatment Conditions:  Results reviewed, labs MET treatment parameters, ok to proceed with treatment.      Post Infusion Assessment:  Patient tolerated infusion without incident.  Blood return noted pre and post infusion.  Site patent and intact, free from redness, edema or discomfort.  No evidence of extravasations.  Access discontinued per protocol.       Discharge Plan:   Discharge instructions reviewed with: Patient.  Patient and/or family verbalized understanding of discharge instructions and all questions answered.  Patient discharged in stable condition accompanied by: self.  Departure Mode: Ambulatory with walker.      Teresa Saavedra RN

## 2024-10-29 ENCOUNTER — HOSPITAL ENCOUNTER (EMERGENCY)
Facility: CLINIC | Age: 89
Discharge: HOME | End: 2024-10-29
Attending: EMERGENCY MEDICINE | Admitting: EMERGENCY MEDICINE
Payer: MEDICARE

## 2024-10-29 VITALS
SYSTOLIC BLOOD PRESSURE: 156 MMHG | HEIGHT: 64 IN | WEIGHT: 136.24 LBS | HEART RATE: 60 BPM | TEMPERATURE: 98.1 F | DIASTOLIC BLOOD PRESSURE: 62 MMHG | RESPIRATION RATE: 16 BRPM | OXYGEN SATURATION: 95 % | BODY MASS INDEX: 23.26 KG/M2

## 2024-10-29 DIAGNOSIS — D61.818 PANCYTOPENIA (H): ICD-10-CM

## 2024-10-29 LAB
ABO/RH(D): NORMAL
ANTIBODY SCREEN: NEGATIVE
BASOPHILS # BLD MANUAL: 0 10E3/UL (ref 0–0.2)
BASOPHILS NFR BLD MANUAL: 0 %
BLD PROD TYP BPU: NORMAL
BLOOD COMPONENT TYPE: NORMAL
CODING SYSTEM: NORMAL
CROSSMATCH: NORMAL
DACRYOCYTES BLD QL SMEAR: SLIGHT
ELLIPTOCYTES BLD QL SMEAR: SLIGHT
EOSINOPHIL # BLD MANUAL: 0 10E3/UL (ref 0–0.7)
EOSINOPHIL NFR BLD MANUAL: 1 %
ERYTHROCYTE [DISTWIDTH] IN BLOOD BY AUTOMATED COUNT: 17.2 % (ref 10–15)
FRAGMENTS BLD QL SMEAR: SLIGHT
HCT VFR BLD AUTO: 22.3 % (ref 35–47)
HGB BLD-MCNC: 7.3 G/DL (ref 11.7–15.7)
ISSUE DATE AND TIME: NORMAL
ISSUE DATE AND TIME: NORMAL
LYMPHOCYTES # BLD MANUAL: 0.5 10E3/UL (ref 0.8–5.3)
LYMPHOCYTES NFR BLD MANUAL: 29 %
MCH RBC QN AUTO: 40.6 PG (ref 26.5–33)
MCHC RBC AUTO-ENTMCNC: 32.7 G/DL (ref 31.5–36.5)
MCV RBC AUTO: 124 FL (ref 78–100)
METAMYELOCYTES # BLD MANUAL: 0 10E3/UL
METAMYELOCYTES NFR BLD MANUAL: 1 %
MONOCYTES # BLD MANUAL: 0.1 10E3/UL (ref 0–1.3)
MONOCYTES NFR BLD MANUAL: 8 %
MYELOCYTES # BLD MANUAL: 0.1 10E3/UL
MYELOCYTES NFR BLD MANUAL: 7 %
NEUTROPHILS # BLD MANUAL: 0.9 10E3/UL (ref 1.6–8.3)
NEUTROPHILS NFR BLD MANUAL: 54 %
NRBC # BLD AUTO: 0 10E3/UL
NRBC BLD MANUAL-RTO: 2 %
PLAT MORPH BLD: ABNORMAL
PLATELET # BLD AUTO: 26 10E3/UL (ref 150–450)
RBC # BLD AUTO: 1.8 10E6/UL (ref 3.8–5.2)
RBC MORPH BLD: ABNORMAL
SPECIMEN EXPIRATION DATE: NORMAL
UNIT ABO/RH: NORMAL
UNIT ABO/RH: NORMAL
UNIT NUMBER: NORMAL
UNIT STATUS: NORMAL
UNIT TYPE ISBT: 6200
UNIT TYPE ISBT: 6200
WBC # BLD AUTO: 1.6 10E3/UL (ref 4–11)

## 2024-10-29 PROCEDURE — 250N000009 HC RX 250: Performed by: STUDENT IN AN ORGANIZED HEALTH CARE EDUCATION/TRAINING PROGRAM

## 2024-10-29 PROCEDURE — 99285 EMERGENCY DEPT VISIT HI MDM: CPT | Mod: 25

## 2024-10-29 PROCEDURE — 85007 BL SMEAR W/DIFF WBC COUNT: CPT | Performed by: EMERGENCY MEDICINE

## 2024-10-29 PROCEDURE — 36430 TRANSFUSION BLD/BLD COMPNT: CPT

## 2024-10-29 PROCEDURE — P9037 PLATE PHERES LEUKOREDU IRRAD: HCPCS | Mod: XU | Performed by: EMERGENCY MEDICINE

## 2024-10-29 PROCEDURE — P9016 RBC LEUKOCYTES REDUCED: HCPCS | Performed by: EMERGENCY MEDICINE

## 2024-10-29 PROCEDURE — 86923 COMPATIBILITY TEST ELECTRIC: CPT | Performed by: EMERGENCY MEDICINE

## 2024-10-29 PROCEDURE — 86900 BLOOD TYPING SEROLOGIC ABO: CPT | Performed by: EMERGENCY MEDICINE

## 2024-10-29 PROCEDURE — 85014 HEMATOCRIT: CPT | Performed by: EMERGENCY MEDICINE

## 2024-10-29 PROCEDURE — 36415 COLL VENOUS BLD VENIPUNCTURE: CPT | Performed by: EMERGENCY MEDICINE

## 2024-10-29 PROCEDURE — 96374 THER/PROPH/DIAG INJ IV PUSH: CPT

## 2024-10-29 RX ORDER — HEPARIN SODIUM (PORCINE) LOCK FLUSH IV SOLN 100 UNIT/ML 100 UNIT/ML
100 SOLUTION INTRAVENOUS ONCE
Status: DISCONTINUED | OUTPATIENT
Start: 2024-10-29 | End: 2024-10-29

## 2024-10-29 RX ADMIN — ANTICOAGULANT CITRATE DEXTROSE SOLUTION FORMULA A 5 ML: 12.25; 11; 3.65 SOLUTION INTRAVENOUS at 19:21

## 2024-10-29 ASSESSMENT — ACTIVITIES OF DAILY LIVING (ADL)
ADLS_ACUITY_SCORE: 0

## 2024-10-29 ASSESSMENT — COLUMBIA-SUICIDE SEVERITY RATING SCALE - C-SSRS
2. HAVE YOU ACTUALLY HAD ANY THOUGHTS OF KILLING YOURSELF IN THE PAST MONTH?: NO
6. HAVE YOU EVER DONE ANYTHING, STARTED TO DO ANYTHING, OR PREPARED TO DO ANYTHING TO END YOUR LIFE?: NO
1. IN THE PAST MONTH, HAVE YOU WISHED YOU WERE DEAD OR WISHED YOU COULD GO TO SLEEP AND NOT WAKE UP?: NO

## 2024-10-29 NOTE — ED PROVIDER NOTES
"Emergency Department Note      Code Status: Prior    History of Present Illness     Chief Complaint:  Epistaxis       HPI   Kayley Putnam is a 89 year old female with MDS and a-fib (not currently anticoagulated since July d/t thrombocytopenia). This AM, was brushing her teeth, she noted epistaxis. She then called EMS. Hx thrombycytopenia (2 weeks ago was [id number]). Does get Epo injections.     Independent Historian:    None    Review of External Notes  [facility] note 12/2023 - thrombocytopenia.    Past Medical History   Medical History, Surgical History, Problem List, and Medications  Reviewed in Epic    Physical Exam   Patient Vitals for the past 24 hrs:   BP Temp Temp src Pulse Resp SpO2 Height Weight   10/29/24 1430 -- 98  F (36.7  C) -- -- -- -- -- --   10/29/24 1425 138/51 98.1  F (36.7  C) -- 56 16 96 % -- --   10/29/24 1423 138/51 -- -- -- -- -- -- --   10/29/24 1420 -- 97.8  F (36.6  C) -- 50 16 96 % -- --   10/29/24 1414 136/54 98.1  F (36.7  C) -- 51 -- -- -- --   10/29/24 1400 136/54 -- -- -- -- -- -- --   10/29/24 1300 (!) 152/56 -- -- 59 -- 95 % -- --   10/29/24 1042 139/46 97.9  F (36.6  C) Temporal 65 18 94 % 1.626 m (5' 4\") 61.8 kg (136 lb 3.9 oz)       Physical Exam  Constitutional: Vital signs reviewed as above.   Eyes: PEERL, EOMI B/L  Head: No external signs of trauma. No lesions noted.  ENT:   Nose: Noncongested, no exudates. No rhinorrhea. No FB noted. No active epistaxis   Mouth/Throat:     Dried blood in oropharyx.  Neck: No JVD noted. FROM   Cardiovascular: normal rate, Regular rhythm and normal heart sounds.  Loud systolic murmur heard. Equal B/L peripheral pulses.  Pulmonary/Chest: Effort normal and breath sounds normal. No respiratory distress. Patient has no wheezes. Patient has no rales.   Gastrointestinal: Soft. There is no tenderness.   Musculoskeletal/Extremities: No pitting edema noted. Normal tone.  Skin: Skin is warm and dry. There is no diaphoresis noted.   Psychiatric: The " patient appears calm.     Diagnostics     Laboratory: Imaging:   Labs Ordered and Resulted from Time of ED Arrival to Time of ED Departure   CBC WITH PLATELETS AND DIFFERENTIAL - Abnormal       Result Value    WBC Count 1.6 (*)     RBC Count 1.80 (*)     Hemoglobin 7.3 (*)     Hematocrit 22.3 (*)      (*)     MCH 40.6 (*)     MCHC 32.7      RDW 17.2 (*)     Platelet Count 26 (*)    MANUAL DIFFERENTIAL - Abnormal    % Neutrophils 54      % Lymphocytes 29      % Monocytes 8      % Eosinophils 1      % Basophils 0      % Metamyelocytes 1      % Myelocytes 7      Absolute Neutrophils 0.9 (*)     Absolute Lymphocytes 0.5 (*)     Absolute Monocytes 0.1      Absolute Eosinophils 0.0      Absolute Basophils 0.0      Absolute Metamyelocytes 0.0      Absolute Myelocytes 0.1 (*)     RBC Morphology Confirmed RBC Indices      Platelet Assessment        Value: Automated Count Confirmed. Platelet morphology is normal.    Elliptocytes Slight (*)     RBC Fragments Slight (*)     Teardrop Cells Slight (*)     NRBCs per 100 WBC 2      Absolute NRBCs 0.0     TYPE AND SCREEN, ADULT    ABO/RH(D) A POS      Antibody Screen Negative      SPECIMEN EXPIRATION DATE [id number]     PREPARE PHERESED PLATELETS (UNIT)    Blood Component Type Platelets      Product Code G4936R11      Unit Status Ready for issue      Unit Number [id number]      CODING SYSTEM QROI802     PREPARE PHERESED PLATELETS (UNIT)    ISSUE DATE AND TIME [id number]      Blood Component Type Platelets      Product Code U9228A39      Unit Status Transfused      Unit Number [id number]      UNIT ABO/RH A+      CODING SYSTEM IMRR728      UNIT TYPE ISBT 6200     PREPARE RED BLOOD CELLS (UNIT)    Blood Component Type Red Blood Cells      Product Code Q4847Z88      Unit Status Issued      Unit Number [id number]      CROSSMATCH Compatible      CODING SYSTEM IIAB358      ISSUE DATE AND TIME [id number]      UNIT ABO/RH A+      UNIT TYPE ISBT 6200     PREPARE  RED BLOOD CELLS (UNIT)   PREPARE PHERESED PLATELETS (UNIT)   TRANSFUSE PHERESED PLATELETS (UNIT)   TRANSFUSE RED BLOOD CELLS (UNIT)   ABO/RH TYPE AND SCREEN     No orders to display         Independent Interpretation  See ED course    ED Course    Medications Administered  Medications - No data to display    Procedures  Procedures     Discussion of Management  See ED Course    ED Course  ED Course as of 10/29/24 1457   Tue Oct 29, 2024   1145 No current epistaxis.   1311 D/W Dr. Norton ([facility]). Would like 1 unity PRBC and PLT. Then can DC.   1341 D/W DaughterWinston, [phone]. Would like a call when the patient can be discharged. She will come pick her up.       Optional/Additional Documentation: None    Medical Decision Making / Diagnosis     MIPS     None    Medical Decision Making:  This 89-year-old presents due to epistaxis.  Please see the HPI and exam for specifics.  The patient was found to be pancytopenic.  We discussed the findings with the patient's covering oncologist and the plan will be to transfuse 1 unit of packed red blood cells and 1 unit of platelets.  The patient has had no further epistaxis in the emergency department.  Once these transfusions are complete, the patient will likely be able to be discharged.  The patient's daughter was notified of this and would like to be called prior to discharge.  The patient will be signed over to the oncoming ED physician for disposition pending completion of transfusion.    Critical Care:  None.    Disposition:  See ED Course and MDM    ICD-10 Codes:    ICD-10-CM    1. Pancytopenia (H)  D61.818            Discharge Medications:  New Prescriptions    No medications on file        10/29/2024   Phil Agee DO     [employer]                    Phil Agee DO  10/29/24 4904

## 2024-10-29 NOTE — ED NOTES
Writer called Winston Camargo and updated. Dtr requesting writer to call her 1 hour prior to discharge for pickup.

## 2024-10-29 NOTE — ED NOTES
Spoke to pt's daughter, Winston Guillen, [phone]. Updated on reason for presentation and plan of care. She states she will call back for an update.

## 2024-10-29 NOTE — ED TRIAGE NOTES
"Pt BIBA from AL facility c/o epistaxis that began this morning. Pt states she was up to brush her teeth when she noticed her nose was running. When she wiped her nose she noticed it was completely blood and called for an ambulance. Pt has hx of a \"blood disorder\" with low platelets; last check 2 weeks ago showed hgb of 8.5 and platelets of 023[address]. 95% O2 on RA. BP 140s/50s en route.     Triage Assessment (Adult)       Row Name 10/29/24 1038          Triage Assessment    Airway WDL WDL        Respiratory WDL    Respiratory WDL WDL        Skin Circulation/Temperature WDL    Skin Circulation/Temperature WDL WDL        Cardiac WDL    Cardiac WDL WDL        Peripheral/Neurovascular WDL    Peripheral Neurovascular WDL WDL        Cognitive/Neuro/Behavioral WDL    Cognitive/Neuro/Behavioral WDL WDL                     "

## 2024-11-22 ENCOUNTER — HOSPITAL ENCOUNTER (EMERGENCY)
Facility: CLINIC | Age: 89
Discharge: HOME | End: 2024-11-22
Attending: EMERGENCY MEDICINE | Admitting: EMERGENCY MEDICINE
Payer: MEDICARE

## 2024-11-22 VITALS
RESPIRATION RATE: 20 BRPM | SYSTOLIC BLOOD PRESSURE: 134 MMHG | HEART RATE: 64 BPM | HEIGHT: 65 IN | DIASTOLIC BLOOD PRESSURE: 51 MMHG | OXYGEN SATURATION: 98 % | WEIGHT: 135 LBS | TEMPERATURE: 98 F | BODY MASS INDEX: 22.49 KG/M2

## 2024-11-22 DIAGNOSIS — D46.9 MYELODYSPLASTIC SYNDROME (H): ICD-10-CM

## 2024-11-22 DIAGNOSIS — R04.0 EPISTAXIS: ICD-10-CM

## 2024-11-22 DIAGNOSIS — D61.818 PANCYTOPENIA (H): ICD-10-CM

## 2024-11-22 DIAGNOSIS — D69.6 THROMBOCYTOPENIA (H): ICD-10-CM

## 2024-11-22 LAB
ABO/RH(D): NORMAL
ANTIBODY SCREEN: NEGATIVE
BASOPHILS # BLD MANUAL: 0 10E3/UL (ref 0–0.2)
BASOPHILS NFR BLD MANUAL: 0 %
BLD PROD TYP BPU: NORMAL
BLOOD COMPONENT TYPE: NORMAL
CODING SYSTEM: NORMAL
CROSSMATCH: NORMAL
DACRYOCYTES BLD QL SMEAR: SLIGHT
ELLIPTOCYTES BLD QL SMEAR: SLIGHT
EOSINOPHIL # BLD MANUAL: 0 10E3/UL (ref 0–0.7)
EOSINOPHIL NFR BLD MANUAL: 1 %
ERYTHROCYTE [DISTWIDTH] IN BLOOD BY AUTOMATED COUNT: 19.8 % (ref 10–15)
HCT VFR BLD AUTO: 22.5 % (ref 35–47)
HGB BLD-MCNC: 7.6 G/DL (ref 11.7–15.7)
INR PPP: 1.14 (ref 0.85–1.15)
ISSUE DATE AND TIME: NORMAL
LYMPHOCYTES # BLD MANUAL: 0.5 10E3/UL (ref 0.8–5.3)
LYMPHOCYTES NFR BLD MANUAL: 37 %
MCH RBC QN AUTO: 40 PG (ref 26.5–33)
MCHC RBC AUTO-ENTMCNC: 33.8 G/DL (ref 31.5–36.5)
MCV RBC AUTO: 118 FL (ref 78–100)
METAMYELOCYTES # BLD MANUAL: 0 10E3/UL
METAMYELOCYTES NFR BLD MANUAL: 1 %
MONOCYTES # BLD MANUAL: 0.2 10E3/UL (ref 0–1.3)
MONOCYTES NFR BLD MANUAL: 13 %
NEUTROPHILS # BLD MANUAL: 0.6 10E3/UL (ref 1.6–8.3)
NEUTROPHILS NFR BLD MANUAL: 48 %
NRBC # BLD AUTO: 0.1 10E3/UL
NRBC BLD MANUAL-RTO: 7 %
PLAT MORPH BLD: ABNORMAL
PLATELET # BLD AUTO: 26 10E3/UL (ref 150–450)
RBC # BLD AUTO: 1.9 10E6/UL (ref 3.8–5.2)
RBC MORPH BLD: ABNORMAL
SPECIMEN EXPIRATION DATE: NORMAL
SPHEROCYTES BLD QL SMEAR: SLIGHT
UNIT ABO/RH: NORMAL
UNIT NUMBER: NORMAL
UNIT STATUS: NORMAL
UNIT TYPE ISBT: 6200
VARIANT LYMPHS BLD QL SMEAR: PRESENT
WBC # BLD AUTO: 1.3 10E3/UL (ref 4–11)

## 2024-11-22 PROCEDURE — 36591 DRAW BLOOD OFF VENOUS DEVICE: CPT | Performed by: EMERGENCY MEDICINE

## 2024-11-22 PROCEDURE — 36430 TRANSFUSION BLD/BLD COMPNT: CPT

## 2024-11-22 PROCEDURE — 86923 COMPATIBILITY TEST ELECTRIC: CPT | Performed by: EMERGENCY MEDICINE

## 2024-11-22 PROCEDURE — 85007 BL SMEAR W/DIFF WBC COUNT: CPT | Performed by: EMERGENCY MEDICINE

## 2024-11-22 PROCEDURE — 85027 COMPLETE CBC AUTOMATED: CPT | Performed by: EMERGENCY MEDICINE

## 2024-11-22 PROCEDURE — 250N000011 HC RX IP 250 OP 636: Performed by: EMERGENCY MEDICINE

## 2024-11-22 PROCEDURE — P9016 RBC LEUKOCYTES REDUCED: HCPCS | Performed by: EMERGENCY MEDICINE

## 2024-11-22 PROCEDURE — P9035 PLATELET PHERES LEUKOREDUCED: HCPCS

## 2024-11-22 PROCEDURE — 85610 PROTHROMBIN TIME: CPT | Performed by: EMERGENCY MEDICINE

## 2024-11-22 PROCEDURE — P9037 PLATE PHERES LEUKOREDU IRRAD: HCPCS | Mod: GZ | Performed by: EMERGENCY MEDICINE

## 2024-11-22 PROCEDURE — 96368 THER/DIAG CONCURRENT INF: CPT

## 2024-11-22 PROCEDURE — 99285 EMERGENCY DEPT VISIT HI MDM: CPT | Mod: 25

## 2024-11-22 PROCEDURE — 86850 RBC ANTIBODY SCREEN: CPT | Performed by: EMERGENCY MEDICINE

## 2024-11-22 PROCEDURE — 86900 BLOOD TYPING SEROLOGIC ABO: CPT | Performed by: EMERGENCY MEDICINE

## 2024-11-22 RX ORDER — HEPARIN SODIUM (PORCINE) LOCK FLUSH IV SOLN 100 UNIT/ML 100 UNIT/ML
100 SOLUTION INTRAVENOUS ONCE
Status: COMPLETED | OUTPATIENT
Start: 2024-11-22 | End: 2024-11-22

## 2024-11-22 RX ORDER — LIDOCAINE HYDROCHLORIDE AND EPINEPHRINE 10; 10 MG/ML; UG/ML
1 INJECTION, SOLUTION INFILTRATION; PERINEURAL ONCE
Status: DISCONTINUED | OUTPATIENT
Start: 2024-11-22 | End: 2024-11-22 | Stop reason: HOSPADM

## 2024-11-22 RX ORDER — LIDOCAINE HYDROCHLORIDE AND EPINEPHRINE 10; 10 MG/ML; UG/ML
INJECTION, SOLUTION INFILTRATION; PERINEURAL
Status: DISCONTINUED
Start: 2024-11-22 | End: 2024-11-22 | Stop reason: HOSPADM

## 2024-11-22 RX ADMIN — HEPARIN 100 UNITS: 100 SYRINGE at 18:16

## 2024-11-22 ASSESSMENT — ACTIVITIES OF DAILY LIVING (ADL)
ADLS_ACUITY_SCORE: 0

## 2024-11-22 ASSESSMENT — COLUMBIA-SUICIDE SEVERITY RATING SCALE - C-SSRS
1. IN THE PAST MONTH, HAVE YOU WISHED YOU WERE DEAD OR WISHED YOU COULD GO TO SLEEP AND NOT WAKE UP?: NO
2. HAVE YOU ACTUALLY HAD ANY THOUGHTS OF KILLING YOURSELF IN THE PAST MONTH?: NO
6. HAVE YOU EVER DONE ANYTHING, STARTED TO DO ANYTHING, OR PREPARED TO DO ANYTHING TO END YOUR LIFE?: NO

## 2024-11-22 NOTE — DISCHARGE INSTRUCTIONS
You should continue to use Vaseline in your nose, do recommend getting a [place] fire.  When your nosebleeds again this is likely to do, I would put the nasal clamp on like reviewed if you do not have any bleeding after 20 minutes she does not need to be rechecked emergency department if however you have significant bleeding around it or persistent bleeding after 20 minutes you should return here.  I do recommend you follow-up in ENT as they can be more aggressive and cauterization of this would be beneficial.  You should continue to follow-up with your primary doctor and your oncologist.  You were given 1 unit of blood cells and 2 units of platelets here today

## 2024-11-22 NOTE — ED NOTES
Bed: ED10  Expected date:   Expected time:   Means of arrival:   Comments:  A594- 89y. Nose bleed, low platelets d/t cancer

## 2024-11-22 NOTE — ED PROVIDER NOTES
Emergency Department Note      History of Present Illness     Chief Complaint   Epistaxis (Atraumatic)      HPI   Kayley Putnam is a 89 year old female history of myelodysplastic syndrome, A-fib, not on Coumadin due to history of thrombocytopenia, presenting for evaluation of a nosebleed.  This was spontaneous, normal for head trauma, it was stopped with EMS arrival.  She was transferred to the hospital as she has a history of requiring blood transfusions following bleeding episode secondary to myelodysplastic syndrome as well as thrombocytopenia.    Independent Historian   None    Review of External Notes   ER note 10/29/2024, presenting for epistaxis hemoglobin at that time was 7.3, platelets are 26, heme-onc consulted, they recommended 1 unit PRBCs and 1 unit of packed platelets.    Past Medical History     Medical History and Problem List   Past Medical History:   Diagnosis Date    AF (atrial fibrillation) (H)     Atherosclerosis of aorta (H)     CAD (coronary artery disease)     History of blood transfusion 02/22/2020    HTN (hypertension)     Hyperlipidemia     Myelodysplastic syndrome (H)     Overweight and obesity(278.0)     PONV (postoperative nausea and vomiting)        Medications   acetaminophen (TYLENOL) 325 MG tablet  amoxicillin (AMOXIL) 500 MG capsule  Ascorbic Acid (VITAMIN C) 500 MG CAPS  atorvastatin (LIPITOR) 20 MG tablet  calcium carbonate 600 mg-vitamin D 400 units (CALTRATE) 600-400 MG-UNIT per tablet  darbepoetin john-polysorbate (ARANESP) 300 MCG/ML SOLN  FUROSEMIDE PO  luspatercept-aamt (REBLOZYL) 25 MG injection  metoprolol (TOPROL-XL) 25 MG 24 hr tablet  senna-docusate (SENOKOT-S/PERICOLACE) 8.6-50 MG tablet  warfarin ANTICOAGULANT (COUMADIN) 4 MG tablet  warfarin ANTICOAGULANT (COUMADIN) 6 MG tablet        Surgical History   Past Surgical History:   Procedure Laterality Date    APPENDECTOMY  1958    ENT SURGERY      tonsillectomy and adenoidectomy as a child    GYN SURGERY  1974     "total hysterectomy     IR CHEST PORT PLACEMENT > 5 YRS OF AGE  5/24/2022    IRRIGATION AND DEBRIDEMENT BREAST Right 2/22/2020    Procedure: IRRIGATION AND DEBRIDEMENT, BREAST;  Surgeon: Avery Gloria MD;  Location: [facility] OR    IRRIGATION AND DEBRIDEMENT BREAST Right 2/22/2020    Procedure: IRRIGATION AND DEBRIDEMENT, BREAST;  Surgeon: Avery Gloria MD;  Location: [facility] OR    LUMPECTOMY BREAST, SEED LOCALIZATION, SENTINEL NODE Right 3/4/2020    Procedure: RIGHT BREAST SEED LOCALIZED LUMPECTOMY WITH RIGHT SENTINEL NODE BIOPSY;  Surgeon: Lali Pope MD;  Location: [facility] OR    ORTHOPEDIC SURGERY  2003, 2009    has had both knees replaced       Physical Exam     Patient Vitals for the past 24 hrs:   BP Temp Temp src Pulse Resp SpO2 Height Weight   11/22/24 1615 139/46 -- -- 64 -- 97 % -- --   11/22/24 1600 (!) 141/49 -- -- 60 -- 96 % -- --   11/22/24 1545 138/56 98  F (36.7  C) Oral 64 16 97 % -- --   11/22/24 1500 139/54 98.3  F (36.8  C) Oral 56 16 94 % -- --   11/22/24 1445 130/47 97.7  F (36.5  C) Oral 57 16 94 % -- --   11/22/24 1430 (!) 146/45 -- -- 53 -- 93 % -- --   11/22/24 1415 135/51 -- -- 51 -- 95 % -- --   11/22/24 1414 -- 97.6  F (36.4  C) Oral -- -- -- -- --   11/22/24 1400 121/69 -- -- 61 -- 94 % -- --   11/22/24 1340 128/48 -- -- 56 -- 94 % -- --   11/22/24 1324 134/44 97.6  F (36.4  C) -- (!) 48 18 95 % -- --   11/22/24 1314 (!) 126/38 97.9  F (36.6  C) -- 56 18 94 % -- --   11/22/24 1230 121/43 -- -- 55 -- -- -- --   11/22/24 1215 132/46 -- -- 51 -- 96 % -- --   11/22/24 1048 (!) 142/47 97.9  F (36.6  C) Oral 60 16 96 % 1.638 m (5' 4.5\") 61.2 kg (135 lb)     Physical Exam  Constitutional: Alert, attentive, GCS 15   HENT:    Nose: Small ulceration of right anterior nare, no active bleeding.  Eyes: EOM are normal, anicteric, conjugate gaze  CV: distal extremities warm, well perfused  Chest: Non-labored breathing on RA  GI:  non tender. No distension. No guarding or rebound.    Neurological: Alert, " attentive, moving all extremities equally.   Skin: Skin is warm and dry.      Diagnostics     Lab Results   Labs Ordered and Resulted from Time of ED Arrival to Time of ED Departure   CBC WITH PLATELETS AND DIFFERENTIAL - Abnormal       Result Value    WBC Count 1.3 (*)     RBC Count 1.90 (*)     Hemoglobin 7.6 (*)     Hematocrit 22.5 (*)      (*)     MCH 40.0 (*)     MCHC 33.8      RDW 19.8 (*)     Platelet Count 26 (*)    MANUAL DIFFERENTIAL - Abnormal    % Neutrophils 48      % Lymphocytes 37      % Monocytes 13      % Eosinophils 1      % Basophils 0      % Metamyelocytes 1      Absolute Neutrophils 0.6 (*)     Absolute Lymphocytes 0.5 (*)     Absolute Monocytes 0.2      Absolute Eosinophils 0.0      Absolute Basophils 0.0      Absolute Metamyelocytes 0.0      RBC Morphology Confirmed RBC Indices      Platelet Assessment        Value: Automated Count Confirmed. Platelet morphology is normal.    Elliptocytes Slight (*)     Reactive Lymphocytes Present (*)     Spherocytes Slight (*)     Teardrop Cells Slight (*)     NRBCs per 100 WBC 7      Absolute NRBCs 0.1     INR - Normal    INR 1.14     TYPE AND SCREEN, ADULT    ABO/RH(D) A POS      Antibody Screen Negative      SPECIMEN EXPIRATION DATE [id number]     PREPARE PHERESED PLATELETS (UNIT)    Blood Component Type Platelets      Product Code C2937L61      Unit Status Transfused      Unit Number [id number]      CODING SYSTEM [id number]      ISSUE DATE AND TIME [id number]      UNIT ABO/RH A+      UNIT TYPE ISBT 6200     PREPARE PHERESED PLATELETS (UNIT)    Blood Component Type Platelets      Product Code L5372K25      Unit Status Transfused      Unit Number [id number]      CODING SYSTEM [id number]      ISSUE DATE AND TIME [id number]      UNIT ABO/RH A+      UNIT TYPE ISBT 6200     PREPARE RED BLOOD CELLS (UNIT)    Blood Component Type Red Blood Cells      Product Code P4547B89      Unit Status Transfused      Unit Number [id number]       CROSSMATCH Compatible      CODING SYSTEM [id number]      ISSUE DATE AND TIME [id number]      UNIT ABO/RH A+      UNIT TYPE ISBT 6200     PREPARE RED BLOOD CELLS (UNIT)   PREPARE PHERESED PLATELETS (UNIT)   TRANSFUSE PHERESED PLATELETS (UNIT)   TRANSFUSE PHERESED PLATELETS (UNIT)   TRANSFUSE RED BLOOD CELLS (UNIT)   ABO/RH TYPE AND SCREEN       Independent Interpretation   None    ED Course      Medications Administered   Medications   lidocaine 1% with EPINEPHrine 1:100,000 injection 1 mL (has no administration in time range)   lidocaine 1% with EPINEPHrine 1:100,000 1 %-1:736350 injection (has no administration in time range)       Procedures   Procedures     Discussion of Management   None    ED Course        Additional Documentation  None    Medical Decision Making / Diagnosis     DOMINGA Putnam is a 89 year old female past medical history significant for myelodysplastic syndrome, thrombocytopenia, A-fib not on anticoagulation secondary to low platelets presenting for epistaxis.  She was seen in the emergency department for the same 10/29/2024, required 1 unit PRBCs and platelets for hemoglobin of 7.3 and platelets of 26, respectively bleeding was hemostatic on arrival.  Today hemoglobin is 7.6, platelets are 26.  As such, she was transfused 1 unit of PRBCs as well as 2 units of platelets.  She did have some intermittent oozing from her right nostril, this was stopped twice with direct pressure, lidocaine with epinephrine was instilled though no indication for cauterization or packing at the time of signout.  She was awaiting completion of her transfusion, anticipate discharge with plan for follow-up with PCP/oncology as well as ENT.  Return precautions were reviewed, what to do at home if she were to rebleed was also reviewed.  Once her PRBCs are finished transfusing, plan will be for discharge back to skilled nursing facility via wheelchair van, I did discuss this plan with her daughter.    Disposition    The patient was discharged.     Diagnosis     ICD-10-CM    1. Pancytopenia (H)  D61.818       2. Myelodysplastic syndrome (H)  D46.9       3. Thrombocytopenia (H)  D69.6       4. Epistaxis  R04.0            Glen Zaragoza MD  [employer]  4:32 PM 11/22/24          Glen Zaragoza MD  11/22/24 1631

## 2024-11-22 NOTE — ED TRIAGE NOTES
Pt arrives from [facility] after a spontaneous nosebleed. EMS able to stop bleed at home, however pt reports history of myelodysplastic syndrome and occurrence of similar episode 3 weeks ago that required blood transfusion.      Triage Assessment (Adult)       Row Name 11/22/24 1055          Triage Assessment    Airway WDL WDL        Respiratory WDL    Respiratory WDL WDL        Cardiac WDL    Cardiac WDL WDL        Peripheral/Neurovascular WDL    Peripheral Neurovascular WDL WDL        Cognitive/Neuro/Behavioral WDL    Cognitive/Neuro/Behavioral WDL WDL

## 2024-11-23 NOTE — ED NOTES
Called facility to let them know that patient will be retuning via w/c van tonight, no medical update given to staff as pt is in the independent living side of facility. Pt aware of phone call and is in agreement with plan. Pt also has the keys to her apartment unit with her for entry to facility.

## 2024-12-04 ENCOUNTER — HOSPITAL ENCOUNTER (OUTPATIENT)
Dept: MAMMOGRAPHY | Facility: CLINIC | Age: 89
Discharge: HOME | End: 2024-12-04
Attending: INTERNAL MEDICINE
Payer: MEDICARE

## 2024-12-04 DIAGNOSIS — Z12.31 VISIT FOR SCREENING MAMMOGRAM: ICD-10-CM

## 2024-12-04 PROCEDURE — 77067 SCR MAMMO BI INCL CAD: CPT

## 2024-12-04 PROCEDURE — 77063 BREAST TOMOSYNTHESIS BI: CPT

## 2025-01-07 ENCOUNTER — MEDICAL CORRESPONDENCE (OUTPATIENT)
Dept: HEALTH INFORMATION MANAGEMENT | Facility: CLINIC | Age: OVER 89
End: 2025-01-07
Payer: MEDICARE

## 2025-01-07 LAB
ABO + RH BLD: NORMAL
BLD GP AB SCN SERPL QL: NEGATIVE
SPECIMEN EXP DATE BLD: NORMAL

## 2025-01-08 ENCOUNTER — LAB (OUTPATIENT)
Dept: LAB | Facility: CLINIC | Age: OVER 89
End: 2025-01-08
Payer: MEDICARE

## 2025-01-08 DIAGNOSIS — D64.9 ANEMIA: ICD-10-CM

## 2025-01-08 PROCEDURE — 36415 COLL VENOUS BLD VENIPUNCTURE: CPT

## 2025-01-08 PROCEDURE — 86900 BLOOD TYPING SEROLOGIC ABO: CPT

## 2025-01-08 PROCEDURE — 86850 RBC ANTIBODY SCREEN: CPT

## 2025-01-08 RX ORDER — EPINEPHRINE 1 MG/ML
0.3 INJECTION, SOLUTION INTRAMUSCULAR; SUBCUTANEOUS EVERY 5 MIN PRN
Status: CANCELLED | OUTPATIENT
Start: 2025-01-08

## 2025-01-08 RX ORDER — HEPARIN SODIUM (PORCINE) LOCK FLUSH IV SOLN 100 UNIT/ML 100 UNIT/ML
5 SOLUTION INTRAVENOUS
Status: CANCELLED | OUTPATIENT
Start: 2025-01-08

## 2025-01-08 RX ORDER — HEPARIN SODIUM,PORCINE 10 UNIT/ML
5-20 VIAL (ML) INTRAVENOUS DAILY PRN
OUTPATIENT
Start: 2025-01-08

## 2025-01-08 RX ORDER — DIPHENHYDRAMINE HYDROCHLORIDE 50 MG/ML
50 INJECTION INTRAMUSCULAR; INTRAVENOUS
Start: 2025-01-08

## 2025-01-08 RX ORDER — DIPHENHYDRAMINE HYDROCHLORIDE 50 MG/ML
50 INJECTION INTRAMUSCULAR; INTRAVENOUS
Status: CANCELLED
Start: 2025-01-08

## 2025-01-08 RX ORDER — HEPARIN SODIUM,PORCINE 10 UNIT/ML
5-20 VIAL (ML) INTRAVENOUS DAILY PRN
Status: CANCELLED | OUTPATIENT
Start: 2025-01-08

## 2025-01-08 RX ORDER — EPINEPHRINE 1 MG/ML
0.3 INJECTION, SOLUTION INTRAMUSCULAR; SUBCUTANEOUS EVERY 5 MIN PRN
OUTPATIENT
Start: 2025-01-08

## 2025-01-09 ENCOUNTER — INFUSION THERAPY VISIT (OUTPATIENT)
Dept: INFUSION THERAPY | Facility: CLINIC | Age: OVER 89
End: 2025-01-09
Attending: NURSE PRACTITIONER
Payer: MEDICARE

## 2025-01-09 VITALS
RESPIRATION RATE: 24 BRPM | TEMPERATURE: 98.5 F | SYSTOLIC BLOOD PRESSURE: 132 MMHG | HEART RATE: 65 BPM | DIASTOLIC BLOOD PRESSURE: 51 MMHG | OXYGEN SATURATION: 92 %

## 2025-01-09 DIAGNOSIS — D46.9 MDS (MYELODYSPLASTIC SYNDROME) (H): ICD-10-CM

## 2025-01-09 DIAGNOSIS — D64.9 ANEMIA, UNSPECIFIED TYPE: ICD-10-CM

## 2025-01-09 DIAGNOSIS — D64.9 ANEMIA: Primary | ICD-10-CM

## 2025-01-09 PROCEDURE — P9016 RBC LEUKOCYTES REDUCED: HCPCS | Performed by: NURSE PRACTITIONER

## 2025-01-09 PROCEDURE — 250N000011 HC RX IP 250 OP 636: Performed by: NURSE PRACTITIONER

## 2025-01-09 PROCEDURE — 258N000003 HC RX IP 258 OP 636: Performed by: NURSE PRACTITIONER

## 2025-01-09 RX ORDER — HEPARIN SODIUM (PORCINE) LOCK FLUSH IV SOLN 100 UNIT/ML 100 UNIT/ML
5 SOLUTION INTRAVENOUS
Status: DISCONTINUED | OUTPATIENT
Start: 2025-01-09 | End: 2025-01-09 | Stop reason: HOSPADM

## 2025-01-09 RX ADMIN — SODIUM CHLORIDE, PRESERVATIVE FREE 100 ML: 5 INJECTION INTRAVENOUS at 10:28

## 2025-01-09 RX ADMIN — HEPARIN 5 ML: 100 SYRINGE at 12:45

## 2025-01-09 NOTE — PROGRESS NOTES
Infusion Nursing Note:  Kayley Putnam presents today for 1 unit PRBC.    Patient seen by provider today: No   present during visit today: Not Applicable.    Note: Max rate for blood 200 ml/hr. Patient still feeling short of breath after blood transfusion. Recommended her to call 911 or seek medical attention if gets worse once she gets home.  Patient agreed.   Let Dr Norton office also know that she is still symptomatic and O2 sats in 92-94% range.    Intravenous Access:  Implanted Port.    Treatment Conditions:  Hgb 6.3.      Post Infusion Assessment:  Patient tolerated infusion without incident.  Blood return noted pre and post infusion.  Site patent and intact, free from redness, edema or discomfort.  No evidence of extravasations.  Access discontinued per protocol.       Discharge Plan:   Discharge instructions reviewed with: Patient.  Patient and/or family verbalized understanding of discharge instructions and all questions answered.  Copy of AVS reviewed with patient and/or family.  Patient will return to Dr Norton [facility] for next appointment.  Patient discharged in stable condition accompanied by: daughter.  Departure Mode: Wheelchair.      Annamaria Trinh RN

## 2025-01-14 ENCOUNTER — HOSPITAL ENCOUNTER (INPATIENT)
Facility: CLINIC | Age: OVER 89
DRG: 808 | End: 2025-01-14
Attending: EMERGENCY MEDICINE | Admitting: INTERNAL MEDICINE
Payer: MEDICARE

## 2025-01-14 DIAGNOSIS — N17.9 AKI (ACUTE KIDNEY INJURY): ICD-10-CM

## 2025-01-14 DIAGNOSIS — I48.19 PERSISTENT ATRIAL FIBRILLATION (H): Primary | ICD-10-CM

## 2025-01-14 DIAGNOSIS — R00.1 JUNCTIONAL BRADYCARDIA: ICD-10-CM

## 2025-01-14 DIAGNOSIS — R00.1 BRADYCARDIA: ICD-10-CM

## 2025-01-14 DIAGNOSIS — D64.9 ANEMIA, UNSPECIFIED TYPE: ICD-10-CM

## 2025-01-14 DIAGNOSIS — K92.1 GASTROINTESTINAL HEMORRHAGE WITH MELENA: ICD-10-CM

## 2025-01-14 LAB
ABO + RH BLD: NORMAL
ANION GAP SERPL CALCULATED.3IONS-SCNC: 14 MMOL/L (ref 7–15)
ATRIAL RATE - MUSE: NORMAL BPM
BASOPHILS # BLD MANUAL: 0 10E3/UL (ref 0–0.2)
BASOPHILS NFR BLD MANUAL: 1 %
BLD GP AB SCN SERPL QL: NEGATIVE
BLD PROD TYP BPU: NORMAL
BLOOD COMPONENT TYPE: NORMAL
BUN SERPL-MCNC: 75.2 MG/DL (ref 8–23)
CALCIUM SERPL-MCNC: 9.3 MG/DL (ref 8.8–10.4)
CHLORIDE SERPL-SCNC: 101 MMOL/L (ref 98–107)
CODING SYSTEM: NORMAL
CREAT SERPL-MCNC: 1.54 MG/DL (ref 0.51–0.95)
CROSSMATCH: NORMAL
CROSSMATCH: NORMAL
DACRYOCYTES BLD QL SMEAR: SLIGHT
DIASTOLIC BLOOD PRESSURE - MUSE: NORMAL MMHG
EGFRCR SERPLBLD CKD-EPI 2021: 32 ML/MIN/1.73M2
ELLIPTOCYTES BLD QL SMEAR: SLIGHT
EOSINOPHIL # BLD MANUAL: 0 10E3/UL (ref 0–0.7)
EOSINOPHIL NFR BLD MANUAL: 1 %
ERYTHROCYTE [DISTWIDTH] IN BLOOD BY AUTOMATED COUNT: ABNORMAL %
GLUCOSE SERPL-MCNC: 121 MG/DL (ref 70–99)
HCO3 SERPL-SCNC: 18 MMOL/L (ref 22–29)
HCT VFR BLD AUTO: 16.6 % (ref 35–47)
HEMOCCULT STL QL: POSITIVE
HGB BLD-MCNC: 5.2 G/DL (ref 11.7–15.7)
HOLD SPECIMEN: NORMAL
INR PPP: 1.23 (ref 0.85–1.15)
INTERPRETATION ECG - MUSE: NORMAL
ISSUE DATE AND TIME: NORMAL
LYMPHOCYTES # BLD MANUAL: 0.5 10E3/UL (ref 0.8–5.3)
LYMPHOCYTES NFR BLD MANUAL: 26 %
MAGNESIUM SERPL-MCNC: 1.7 MG/DL (ref 1.7–2.3)
MCH RBC QN AUTO: 36.1 PG (ref 26.5–33)
MCHC RBC AUTO-ENTMCNC: 31.3 G/DL (ref 31.5–36.5)
MCV RBC AUTO: 115 FL (ref 78–100)
MONOCYTES # BLD MANUAL: 0.3 10E3/UL (ref 0–1.3)
MONOCYTES NFR BLD MANUAL: 15 %
NEUTROPHILS # BLD MANUAL: 1 10E3/UL (ref 1.6–8.3)
NEUTROPHILS NFR BLD MANUAL: 57 %
NRBC # BLD AUTO: 0.1 10E3/UL
NRBC BLD MANUAL-RTO: 3 %
P AXIS - MUSE: NORMAL DEGREES
PHOSPHATE SERPL-MCNC: 3.4 MG/DL (ref 2.5–4.5)
PLAT MORPH BLD: ABNORMAL
PLATELET # BLD AUTO: 21 10E3/UL (ref 150–450)
POTASSIUM SERPL-SCNC: 4.8 MMOL/L (ref 3.4–5.3)
PR INTERVAL - MUSE: NORMAL MS
QRS DURATION - MUSE: 98 MS
QT - MUSE: 464 MS
QTC - MUSE: 443 MS
R AXIS - MUSE: -46 DEGREES
RBC # BLD AUTO: 1.44 10E6/UL (ref 3.8–5.2)
RBC MORPH BLD: ABNORMAL
SODIUM SERPL-SCNC: 133 MMOL/L (ref 135–145)
SPECIMEN EXP DATE BLD: NORMAL
SYSTOLIC BLOOD PRESSURE - MUSE: NORMAL MMHG
T AXIS - MUSE: 33 DEGREES
UNIT ABO/RH: NORMAL
UNIT NUMBER: NORMAL
UNIT STATUS: NORMAL
UNIT TYPE ISBT: 6200
VARIANT LYMPHS BLD QL SMEAR: PRESENT
VENTRICULAR RATE- MUSE: 55 BPM
WBC # BLD AUTO: 1.8 10E3/UL (ref 4–11)

## 2025-01-14 PROCEDURE — 36430 TRANSFUSION BLD/BLD COMPNT: CPT

## 2025-01-14 PROCEDURE — 36415 COLL VENOUS BLD VENIPUNCTURE: CPT | Performed by: EMERGENCY MEDICINE

## 2025-01-14 PROCEDURE — P9037 PLATE PHERES LEUKOREDU IRRAD: HCPCS | Performed by: EMERGENCY MEDICINE

## 2025-01-14 PROCEDURE — 99285 EMERGENCY DEPT VISIT HI MDM: CPT | Mod: 25

## 2025-01-14 PROCEDURE — 250N000013 HC RX MED GY IP 250 OP 250 PS 637: Performed by: INTERNAL MEDICINE

## 2025-01-14 PROCEDURE — 82272 OCCULT BLD FECES 1-3 TESTS: CPT | Performed by: EMERGENCY MEDICINE

## 2025-01-14 PROCEDURE — 86923 COMPATIBILITY TEST ELECTRIC: CPT | Performed by: EMERGENCY MEDICINE

## 2025-01-14 PROCEDURE — 85027 COMPLETE CBC AUTOMATED: CPT | Performed by: EMERGENCY MEDICINE

## 2025-01-14 PROCEDURE — 120N000001 HC R&B MED SURG/OB

## 2025-01-14 PROCEDURE — 250N000011 HC RX IP 250 OP 636: Performed by: INTERNAL MEDICINE

## 2025-01-14 PROCEDURE — P9016 RBC LEUKOCYTES REDUCED: HCPCS | Performed by: EMERGENCY MEDICINE

## 2025-01-14 PROCEDURE — 83735 ASSAY OF MAGNESIUM: CPT | Performed by: INTERNAL MEDICINE

## 2025-01-14 PROCEDURE — 99223 1ST HOSP IP/OBS HIGH 75: CPT | Mod: AI | Performed by: INTERNAL MEDICINE

## 2025-01-14 PROCEDURE — 250N000009 HC RX 250: Performed by: INTERNAL MEDICINE

## 2025-01-14 PROCEDURE — 93005 ELECTROCARDIOGRAM TRACING: CPT | Mod: 76

## 2025-01-14 PROCEDURE — 250N000009 HC RX 250: Performed by: EMERGENCY MEDICINE

## 2025-01-14 PROCEDURE — 96374 THER/PROPH/DIAG INJ IV PUSH: CPT

## 2025-01-14 PROCEDURE — 82565 ASSAY OF CREATININE: CPT | Performed by: EMERGENCY MEDICINE

## 2025-01-14 PROCEDURE — 85610 PROTHROMBIN TIME: CPT | Performed by: EMERGENCY MEDICINE

## 2025-01-14 PROCEDURE — 80048 BASIC METABOLIC PNL TOTAL CA: CPT | Performed by: EMERGENCY MEDICINE

## 2025-01-14 PROCEDURE — 86901 BLOOD TYPING SEROLOGIC RH(D): CPT | Performed by: EMERGENCY MEDICINE

## 2025-01-14 PROCEDURE — 86900 BLOOD TYPING SEROLOGIC ABO: CPT | Performed by: EMERGENCY MEDICINE

## 2025-01-14 PROCEDURE — 84100 ASSAY OF PHOSPHORUS: CPT | Performed by: INTERNAL MEDICINE

## 2025-01-14 PROCEDURE — 93005 ELECTROCARDIOGRAM TRACING: CPT

## 2025-01-14 PROCEDURE — 85007 BL SMEAR W/DIFF WBC COUNT: CPT | Performed by: EMERGENCY MEDICINE

## 2025-01-14 RX ORDER — SPIRONOLACTONE 25 MG/1
1 TABLET ORAL DAILY
Status: ON HOLD | COMMUNITY
Start: 2024-05-29 | End: 2025-01-18

## 2025-01-14 RX ORDER — CALCIUM CARBONATE 500 MG/1
1000 TABLET, CHEWABLE ORAL 4 TIMES DAILY PRN
Status: DISCONTINUED | OUTPATIENT
Start: 2025-01-14 | End: 2025-01-18 | Stop reason: HOSPADM

## 2025-01-14 RX ORDER — ACETAMINOPHEN 500 MG
500 TABLET ORAL EVERY 6 HOURS PRN
COMMUNITY

## 2025-01-14 RX ORDER — AMOXICILLIN 250 MG
2 CAPSULE ORAL 2 TIMES DAILY PRN
Status: DISCONTINUED | OUTPATIENT
Start: 2025-01-14 | End: 2025-01-18 | Stop reason: HOSPADM

## 2025-01-14 RX ORDER — LETROZOLE 2.5 MG/1
2.5 TABLET, FILM COATED ORAL DAILY
Status: DISCONTINUED | OUTPATIENT
Start: 2025-01-15 | End: 2025-01-18 | Stop reason: HOSPADM

## 2025-01-14 RX ORDER — SPIRONOLACTONE 25 MG/1
25 TABLET ORAL DAILY
Status: DISCONTINUED | OUTPATIENT
Start: 2025-01-15 | End: 2025-01-18 | Stop reason: HOSPADM

## 2025-01-14 RX ORDER — AMOXICILLIN 250 MG
1 CAPSULE ORAL 2 TIMES DAILY PRN
Status: DISCONTINUED | OUTPATIENT
Start: 2025-01-14 | End: 2025-01-18 | Stop reason: HOSPADM

## 2025-01-14 RX ORDER — LETROZOLE 2.5 MG/1
1 TABLET, FILM COATED ORAL DAILY
COMMUNITY
Start: 2024-11-13

## 2025-01-14 RX ORDER — LIDOCAINE 40 MG/G
CREAM TOPICAL
Status: DISCONTINUED | OUTPATIENT
Start: 2025-01-14 | End: 2025-01-18 | Stop reason: HOSPADM

## 2025-01-14 RX ORDER — FUROSEMIDE 40 MG/1
40 TABLET ORAL DAILY
Status: DISCONTINUED | OUTPATIENT
Start: 2025-01-14 | End: 2025-01-18 | Stop reason: HOSPADM

## 2025-01-14 RX ORDER — ATORVASTATIN CALCIUM 20 MG/1
20 TABLET, FILM COATED ORAL AT BEDTIME
Status: DISCONTINUED | OUTPATIENT
Start: 2025-01-14 | End: 2025-01-18 | Stop reason: HOSPADM

## 2025-01-14 RX ORDER — ONDANSETRON 4 MG/1
4 TABLET, ORALLY DISINTEGRATING ORAL EVERY 6 HOURS PRN
Status: DISCONTINUED | OUTPATIENT
Start: 2025-01-14 | End: 2025-01-18 | Stop reason: HOSPADM

## 2025-01-14 RX ORDER — ACETAMINOPHEN 325 MG/1
975 TABLET ORAL EVERY 8 HOURS PRN
Status: DISCONTINUED | OUTPATIENT
Start: 2025-01-14 | End: 2025-01-18 | Stop reason: HOSPADM

## 2025-01-14 RX ORDER — ONDANSETRON 2 MG/ML
4 INJECTION INTRAMUSCULAR; INTRAVENOUS EVERY 6 HOURS PRN
Status: DISCONTINUED | OUTPATIENT
Start: 2025-01-14 | End: 2025-01-18 | Stop reason: HOSPADM

## 2025-01-14 RX ADMIN — PANTOPRAZOLE SODIUM 40 MG: 40 INJECTION, POWDER, FOR SOLUTION INTRAVENOUS at 17:25

## 2025-01-14 RX ADMIN — FUROSEMIDE 40 MG: 40 TABLET ORAL at 20:24

## 2025-01-14 RX ADMIN — PHYTONADIONE 5 MG: 10 INJECTION, EMULSION INTRAMUSCULAR; INTRAVENOUS; SUBCUTANEOUS at 20:28

## 2025-01-14 RX ADMIN — ATORVASTATIN CALCIUM 20 MG: 20 TABLET, FILM COATED ORAL at 21:07

## 2025-01-14 ASSESSMENT — ACTIVITIES OF DAILY LIVING (ADL)
ADLS_ACUITY_SCORE: 45
ADLS_ACUITY_SCORE: 49
ADLS_ACUITY_SCORE: 45
ADLS_ACUITY_SCORE: 49
ADLS_ACUITY_SCORE: 45

## 2025-01-14 NOTE — ED NOTES
Zoll pads and monitor placed on patient due to bradycardia to the upper 30s. MD Agee at bedside for eval. Repeat EKG completed. Pt remains on cardiac monitor and frequent VS.

## 2025-01-14 NOTE — ED PROVIDER NOTES
"Emergency Department Note      Code Status: No CPR- Do NOT Intubate    History of Present Illness     Chief Complaint:  Abnormal Labs    The history is provided by the patient.      Kayley Putnam is a 89 year old female with a history of MDS and breast cancer who presents to the emergency department for abnormal labs. The patient states that for 2 weeks, she has been experiencing lightheadedness, loss of appetite, and shortness of breath. She reports that her lightheadedness and shortness of breath are exacerbated upon exertion. She adds that for 1-2 weeks, she has also observed \"dark\" stools. She denies taking iron supplements. No hx of ulcers or GI bleeds. She states that she has a hx of MDS and breast cancer in which 1 week ago, she presented to her oncologist and underwent a blood transfusion for a hemoglobin of 6.3, receiving 1 unit of blood. She notes that today, a nurse from her oncologist office reported that her hemoglobin is 5.0. No hx of COPD or asthma. No chest pain. No abdominal pain, nausea, or vomiting. No syncope. No cough or wheezing. No fever or chills. No vision changes. No leg swelling and adds that she has been taking her Lasix, as directed. Denies recently being diagnosed with Covid or influenza but reports that at her living facility, there have been cases of Covid. She adds that she also has a hx of AFIB and took her dose of metoprolol this morning.    Independent Historian:    None    Review of External Notes  None    Past Medical History   Medical History, Surgical History, Problem List, and Medications  Reviewed in Epic    Physical Exam   Patient Vitals for the past 24 hrs:   BP Temp Temp src Pulse Resp SpO2 Height Weight   01/14/25 2140 131/69 -- -- 53 -- 96 % -- --   01/14/25 2125 131/56 98.2  F (36.8  C) Oral 56 18 95 % -- --   01/14/25 2104 126/52 98.1  F (36.7  C) Oral 55 20 95 % -- --   01/14/25 2100 126/52 -- -- 52 -- 96 % -- --   01/14/25 2045 126/47 -- -- (!) 45 -- 97 % -- -- "   01/14/25 2030 (!) 142/50 -- -- 64 -- 93 % -- --   01/14/25 2019 (!) 145/49 98.1  F (36.7  C) Oral 64 20 96 % -- --   01/14/25 2015 (!) 145/49 -- -- 56 -- 97 % -- --   01/14/25 2012 -- -- -- 54 -- 97 % -- --   01/14/25 2011 -- -- -- 59 -- 96 % -- --   01/14/25 2010 -- -- -- 63 -- 94 % -- --   01/14/25 2009 -- -- -- 65 -- 95 % -- --   01/14/25 2008 -- -- -- 60 -- 95 % -- --   01/14/25 2007 -- -- -- 63 -- 94 % -- --   01/14/25 2006 -- -- -- 68 -- 93 % -- --   01/14/25 2005 -- -- -- 66 -- 95 % -- --   01/14/25 2000 (!) 144/57 -- -- 58 -- 96 % -- --   01/14/25 1943 (!) 141/54 97.4  F (36.3  C) -- 63 20 -- -- --   01/14/25 1931 -- -- -- 55 28 95 % -- --   01/14/25 1930 125/46 -- -- 53 -- 95 % -- --   01/14/25 1928 135/52 97.7  F (36.5  C) -- 58 26 -- -- --   01/14/25 1916 -- -- -- 60 20 95 % -- --   01/14/25 1915 126/47 98  F (36.7  C) Temporal 64 21 96 % -- --   01/14/25 1830 123/40 97.2  F (36.2  C) Temporal 55 (!) 33 91 % -- --   01/14/25 1801 -- -- -- 55 27 95 % -- --   01/14/25 1800 118/44 -- -- (!) 48 26 95 % -- --   01/14/25 1755 -- -- -- (!) 46 25 96 % -- --   01/14/25 1754 -- -- -- 53 25 94 % -- --   01/14/25 1753 -- -- -- 55 21 95 % -- --   01/14/25 1752 -- -- -- (!) 47 28 94 % -- --   01/14/25 1751 -- -- -- 55 24 94 % -- --   01/14/25 1750 -- -- -- 50 24 95 % -- --   01/14/25 1749 -- -- -- (!) 47 24 96 % -- --   01/14/25 1748 -- -- -- 52 28 98 % -- --   01/14/25 1745 (!) 116/38 -- -- 52 24 95 % -- --   01/14/25 1730 126/41 -- -- 53 21 93 % -- --   01/14/25 1715 115/45 98.1  F (36.7  C) -- 53 26 -- -- --   01/14/25 1647 -- -- -- (!) 47 22 93 % -- --   01/14/25 1645 (!) 116/38 -- -- (!) 45 (!) 0 97 % -- --   01/14/25 1630 117/41 -- -- (!) 49 25 95 % -- --   01/14/25 1620 121/43 -- -- (!) 49 25 95 % -- --   01/14/25 1614 -- -- -- (!) 36 28 96 % -- --   01/14/25 1600 118/45 -- -- 56 28 93 % -- --   01/14/25 1530 130/45 -- -- 54 17 96 % -- --   01/14/25 1527 -- -- -- 61 13 95 % -- --   01/14/25 1526 (!) 121/35 --  "-- 69 24 96 % -- --   01/14/25 1525 -- -- -- 71 23 97 % -- --   01/14/25 1524 -- -- -- -- -- 95 % -- --   01/14/25 1523 (!) 121/35 -- -- 65 -- 94 % -- --   01/14/25 1520 -- 97.5  F (36.4  C) Oral 60 18 96 % 1.626 m (5' 4\") 61.2 kg (135 lb)     Physical Exam  Constitutional: Vital signs reviewed as above.   Eyes: PEERL, EOMI B/L  Neck: No JVD noted. FROM   Cardiovascular: normal rate, Irregular rhythm and normal heart sounds.  Systolic murmur heard. Equal B/L peripheral pulses.  Pulmonary/Chest: Effort normal and breath sounds normal. No respiratory distress. Patient has no wheezes. Patient has no rales.   Gastrointestinal: Soft. There is no tenderness.   Musculoskeletal/Extremities: B/L nonpitting ankle edema noted. Normal tone.  Skin: Skin is warm and dry. There is no diaphoresis noted.   Psychiatric: The patient appears calm.   Rectal (Chaperoned)   Normal tone   No gross blood   Black, tarry colored stool noted   No external hemorrhoids noted   No anal fissures noted   Stool is palpated in rectal vault     Diagnostics     Laboratory: Imaging:   Labs Ordered and Resulted from Time of ED Arrival to Time of ED Departure   BASIC METABOLIC PANEL - Abnormal       Result Value    Sodium 133 (*)     Potassium 4.8      Chloride 101      Carbon Dioxide (CO2) 18 (*)     Anion Gap 14      Urea Nitrogen 75.2 (*)     Creatinine 1.54 (*)     GFR Estimate 32 (*)     Calcium 9.3      Glucose 121 (*)    INR - Abnormal    INR 1.23 (*)    CBC WITH PLATELETS AND DIFFERENTIAL - Abnormal    WBC Count 1.8 (*)     RBC Count 1.44 (*)     Hemoglobin 5.2 (*)     Hematocrit 16.6 (*)      (*)     MCH 36.1 (*)     MCHC 31.3 (*)     RDW        Platelet Count 21 (*)    OCCULT BLOOD STOOL - Abnormal    Occult Blood Positive (*)    MANUAL DIFFERENTIAL - Abnormal    % Neutrophils 57      % Lymphocytes 26      % Monocytes 15      % Eosinophils 1      % Basophils 1      Absolute Neutrophils 1.0 (*)     Absolute Lymphocytes 0.5 (*)     Absolute " Monocytes 0.3      Absolute Eosinophils 0.0      Absolute Basophils 0.0      NRBCs per 100 WBC 3      Absolute NRBCs 0.1      RBC Morphology Confirmed RBC Indices      Platelet Assessment        Value: Automated Count Confirmed. Platelet morphology is normal.    Elliptocytes Slight (*)     Reactive Lymphocytes Present (*)     Teardrop Cells Slight (*)    TYPE AND SCREEN, ADULT    ABO/RH(D) A POS      Antibody Screen Negative      SPECIMEN EXPIRATION DATE [id number]     PREPARE RED BLOOD CELLS (UNIT)    Blood Component Type Red Blood Cells      Product Code E4920C27      Unit Status Transfused      Unit Number [id number]      CROSSMATCH Compatible      CODING SYSTEM LBNJ829      ISSUE DATE AND TIME [id number]      UNIT ABO/RH A+      UNIT TYPE ISBT 6200     PREPARE RED BLOOD CELLS (UNIT)    Blood Component Type Red Blood Cells      Product Code R9495M99      Unit Status Transfused      Unit Number [id number]      CROSSMATCH Compatible      CODING SYSTEM [id number]      ISSUE DATE AND TIME [id number]      UNIT ABO/RH A+      UNIT TYPE ISBT 6200     PREPARE PHERESED PLATELETS (UNIT)    Blood Component Type Platelets      Product Code F7156W70      Unit Status Transfused      Unit Number [id number]      CODING SYSTEM [id number]      ISSUE DATE AND TIME [id number]      UNIT ABO/RH A+      UNIT TYPE ISBT 6200     PREPARE RED BLOOD CELLS (UNIT)   PREPARE PHERESED PLATELETS (UNIT)   TRANSFUSE RED BLOOD CELLS (UNIT)   TRANSFUSE RED BLOOD CELLS (UNIT)   TRANSFUSE PHERESED PLATELETS (UNIT)   ABO/RH TYPE AND SCREEN     No orders to display         EKG   ECG results from 01/14/25   EKG 12-lead, tracing only     Value    Systolic Blood Pressure     Diastolic Blood Pressure     Ventricular Rate 55    Atrial Rate     CA Interval     QRS Duration 98        QTc 443    P Axis     R AXIS -46    T Axis 33    Interpretation ECG      Atrial fibrillation with slow ventricular response  Left anterior fascicular  block  Minimal voltage criteria for LVH, may be normal variant ( [place] product )  Nonspecific ST abnormality  Abnormal ECG  When compared with ECG of 21-Feb-2020 21:21,  T wave inversion no longer evident in Lateral leads  Interpreted by me at 1531      ECG  ECG taken at 1635, ECG read at 1646  Junctional bradycardia  Left anterior fascicular block  Abnormal ECG    Junctional bradycardia has replaced A-fib as compared to prior today  Rate 35 bpm. SC interval * ms. QRS duration 92 ms. QT/QTc 504/384 ms. P-R-T axes * -45 -20.     Independent Interpretation  See ED course    ED Course    Medications Administered  Medications   pantoprazole (PROTONIX) IV push injection 40 mg (has no administration in time range)   atorvastatin (LIPITOR) tablet 20 mg (20 mg Oral $Given 1/14/25 2107)   furosemide (LASIX) tablet 40 mg (40 mg Oral $Given 1/14/25 2024)   phytonadione (MEPHYTON/VITAMIN K) 1 MG/ML oral solution 5 mg (5 mg Oral $Given 1/14/25 2028)   lidocaine 1 % 0.1-1 mL (has no administration in time range)   lidocaine (LMX4) cream (has no administration in time range)   sodium chloride (PF) 0.9% PF flush 3 mL (3 mLs Intracatheter Not Given 1/14/25 2021)   sodium chloride (PF) 0.9% PF flush 3 mL (has no administration in time range)   senna-docusate (SENOKOT-S/PERICOLACE) 8.6-50 MG per tablet 1 tablet (has no administration in time range)     Or   senna-docusate (SENOKOT-S/PERICOLACE) 8.6-50 MG per tablet 2 tablet (has no administration in time range)   calcium carbonate (TUMS) chewable tablet 1,000 mg (has no administration in time range)   acetaminophen (TYLENOL) tablet 975 mg (has no administration in time range)   ondansetron (ZOFRAN ODT) ODT tab 4 mg (has no administration in time range)     Or   ondansetron (ZOFRAN) injection 4 mg (has no administration in time range)   letrozole (FEMARA) tablet 2.5 mg (has no administration in time range)   spironolactone (ALDACTONE) tablet 25 mg (has no administration in time  range)   pantoprazole (PROTONIX) IV push injection 40 mg (40 mg Intravenous $Given 1/14/25 1725)     Discussion of Management  D/w Dr. Resendez. Ok for med-tele admit. Blood transfusion (2U PRBS) and PLT transfusion (1 Unit ordered)    ED Course  ED Course as of 01/14/25 2142 Tue Jan 14, 2025   1524 I obtained history and examined the patient as noted above.      1537 Norton. No plt txfer unless active bleeding.    1645 Nursing notified me that the patient was bradycardic with rates in the 30s.  She performed an EKG and handed that to one of my partners at 1635.  I evaluated the patient at 1645.  The heart rate is now in the 50s.  The patient does not feel poorly.  We discussed her wishes and she wishes to be DNR.   1706 D/W Dr. Vilchis (cardiology). Pacemaker can be considered if the patient is symptomatic.     Optional/Additional Documentation: None    Medical Decision Making / Diagnosis     MIPS  None    Medical Decision Making:  This 89-year-old presents due to abnormal labs.  Please see the HPI and exam for specifics.  The patient also has noted lightheadedness and shortness of breath.  She has also been seeing some dark stools.  Her differential was broad but does include GI bleed as well as anemia given her known myelodysplastic syndrome.  Thrombocytopenia was also considered likely given her prior history of the same.    Labs are notable for pancytopenia.  Her hemoglobin was confirmed to be quite low here and I think this is likely due to what is probably an upper GI bleed.    I did not feel that x-ray or CT imaging was needed.    The patient was given a transfusion of both red blood cells and platelets.    She also had an episode of bradycardia which looks like a junctional rhythm here though the patient was not symptomatic.     I discussed the case with cardiology and hospitalist service.  Will hold the patient's metoprolol but because she is not symptomatic we will not pursue pacemaker placement at this  point.  The patient did confirm to me that she wishes to be DNR.  She was otherwise admitted for ongoing care    Critical Care:      Organ systems at risk for life threatening failure: Cardiovascular, GI, and Hematologic  Associated problems: Arrhythmia and Bleeding  Critical Interventions: Blood Products    Total Time: 35 minutes (excluding separately billed procedures)     Includes: Bedside management, Case discussion related to critical care, Documentation, Multiple re-evaluations, Record review, and Test review.      Excludes: EKG Interpretation         Disposition:  See ED Course and MDM    ICD-10 Codes:    ICD-10-CM    1. Anemia, unspecified type  D64.9       2. ALEXEI (acute kidney injury)  N17.9       3. Bradycardia  R00.1       4. Junctional bradycardia  R00.1       5. Gastrointestinal hemorrhage with melena  K92.1         1/14/2025   Phil Agee DO     [employer]          Phil Agee DO  01/14/25 2149

## 2025-01-14 NOTE — ED TRIAGE NOTES
Pt arrives via EMS from home. Blood transfusion done last week, [facility] redrew labs today and hgb 5.0. Pt reports fatigue, dizziness and SOB. Pt has port. Hx A fib.

## 2025-01-15 LAB
ALBUMIN SERPL BCG-MCNC: 3.9 G/DL (ref 3.5–5.2)
ALP SERPL-CCNC: 86 U/L (ref 40–150)
ALT SERPL W P-5'-P-CCNC: 9 U/L (ref 0–50)
ANION GAP SERPL CALCULATED.3IONS-SCNC: 14 MMOL/L (ref 7–15)
AST SERPL W P-5'-P-CCNC: 10 U/L (ref 0–45)
ATRIAL RATE - MUSE: NORMAL BPM
BILIRUB SERPL-MCNC: 1.2 MG/DL
BUN SERPL-MCNC: 67.4 MG/DL (ref 8–23)
BURR CELLS BLD QL SMEAR: SLIGHT
CALCIUM SERPL-MCNC: 9.1 MG/DL (ref 8.8–10.4)
CHLORIDE SERPL-SCNC: 99 MMOL/L (ref 98–107)
CREAT SERPL-MCNC: 1.57 MG/DL (ref 0.51–0.95)
DIASTOLIC BLOOD PRESSURE - MUSE: NORMAL MMHG
EGFRCR SERPLBLD CKD-EPI 2021: 31 ML/MIN/1.73M2
ELLIPTOCYTES BLD QL SMEAR: SLIGHT
ERYTHROCYTE [DISTWIDTH] IN BLOOD BY AUTOMATED COUNT: 26.6 % (ref 10–15)
FRAGMENTS BLD QL SMEAR: ABNORMAL
GLUCOSE SERPL-MCNC: 94 MG/DL (ref 70–99)
HCO3 SERPL-SCNC: 21 MMOL/L (ref 22–29)
HCT VFR BLD AUTO: 22.7 % (ref 35–47)
HGB BLD-MCNC: 7.6 G/DL (ref 11.7–15.7)
HGB BLD-MCNC: 7.7 G/DL (ref 11.7–15.7)
HGB BLD-MCNC: 8.2 G/DL (ref 11.7–15.7)
INTERPRETATION ECG - MUSE: NORMAL
MAGNESIUM SERPL-MCNC: 1.7 MG/DL (ref 1.7–2.3)
MCH RBC QN AUTO: 34.1 PG (ref 26.5–33)
MCHC RBC AUTO-ENTMCNC: 33.5 G/DL (ref 31.5–36.5)
MCV RBC AUTO: 102 FL (ref 78–100)
P AXIS - MUSE: NORMAL DEGREES
PHOSPHATE SERPL-MCNC: 3.5 MG/DL (ref 2.5–4.5)
PLAT MORPH BLD: ABNORMAL
PLATELET # BLD AUTO: 19 10E3/UL (ref 150–450)
POLYCHROMASIA BLD QL SMEAR: SLIGHT
POTASSIUM SERPL-SCNC: 4.7 MMOL/L (ref 3.4–5.3)
PR INTERVAL - MUSE: NORMAL MS
PROT SERPL-MCNC: 6 G/DL (ref 6.4–8.3)
QRS DURATION - MUSE: 92 MS
QT - MUSE: 504 MS
QTC - MUSE: 384 MS
R AXIS - MUSE: -45 DEGREES
RBC # BLD AUTO: 2.23 10E6/UL (ref 3.8–5.2)
RBC MORPH BLD: ABNORMAL
SODIUM SERPL-SCNC: 134 MMOL/L (ref 135–145)
SYSTOLIC BLOOD PRESSURE - MUSE: NORMAL MMHG
T AXIS - MUSE: -20 DEGREES
VENTRICULAR RATE- MUSE: 35 BPM
WBC # BLD AUTO: 1.4 10E3/UL (ref 4–11)

## 2025-01-15 PROCEDURE — 36415 COLL VENOUS BLD VENIPUNCTURE: CPT | Performed by: INTERNAL MEDICINE

## 2025-01-15 PROCEDURE — 36591 DRAW BLOOD OFF VENOUS DEVICE: CPT | Performed by: INTERNAL MEDICINE

## 2025-01-15 PROCEDURE — 99233 SBSQ HOSP IP/OBS HIGH 50: CPT | Performed by: INTERNAL MEDICINE

## 2025-01-15 PROCEDURE — 84100 ASSAY OF PHOSPHORUS: CPT | Performed by: INTERNAL MEDICINE

## 2025-01-15 PROCEDURE — 250N000011 HC RX IP 250 OP 636: Performed by: INTERNAL MEDICINE

## 2025-01-15 PROCEDURE — 250N000009 HC RX 250: Performed by: INTERNAL MEDICINE

## 2025-01-15 PROCEDURE — 85014 HEMATOCRIT: CPT | Performed by: INTERNAL MEDICINE

## 2025-01-15 PROCEDURE — 120N000001 HC R&B MED SURG/OB

## 2025-01-15 PROCEDURE — 250N000013 HC RX MED GY IP 250 OP 250 PS 637: Performed by: INTERNAL MEDICINE

## 2025-01-15 PROCEDURE — 85018 HEMOGLOBIN: CPT | Performed by: INTERNAL MEDICINE

## 2025-01-15 PROCEDURE — 83735 ASSAY OF MAGNESIUM: CPT | Performed by: INTERNAL MEDICINE

## 2025-01-15 PROCEDURE — 80053 COMPREHEN METABOLIC PANEL: CPT | Performed by: INTERNAL MEDICINE

## 2025-01-15 RX ORDER — HEPARIN SODIUM,PORCINE 10 UNIT/ML
5-10 VIAL (ML) INTRAVENOUS
Status: DISCONTINUED | OUTPATIENT
Start: 2025-01-15 | End: 2025-01-18 | Stop reason: HOSPADM

## 2025-01-15 RX ORDER — HEPARIN SODIUM (PORCINE) LOCK FLUSH IV SOLN 100 UNIT/ML 100 UNIT/ML
5-10 SOLUTION INTRAVENOUS
Status: DISCONTINUED | OUTPATIENT
Start: 2025-01-15 | End: 2025-01-18 | Stop reason: HOSPADM

## 2025-01-15 RX ORDER — HEPARIN SODIUM,PORCINE 10 UNIT/ML
5-10 VIAL (ML) INTRAVENOUS EVERY 24 HOURS
Status: DISCONTINUED | OUTPATIENT
Start: 2025-01-15 | End: 2025-01-18 | Stop reason: HOSPADM

## 2025-01-15 RX ADMIN — ACETAMINOPHEN 975 MG: 325 TABLET, FILM COATED ORAL at 01:56

## 2025-01-15 RX ADMIN — SPIRONOLACTONE 25 MG: 25 TABLET ORAL at 08:28

## 2025-01-15 RX ADMIN — PANTOPRAZOLE SODIUM 40 MG: 40 INJECTION, POWDER, FOR SOLUTION INTRAVENOUS at 19:49

## 2025-01-15 RX ADMIN — PANTOPRAZOLE SODIUM 40 MG: 40 INJECTION, POWDER, FOR SOLUTION INTRAVENOUS at 08:28

## 2025-01-15 RX ADMIN — HEPARIN, PORCINE (PF) 10 UNIT/ML INTRAVENOUS SYRINGE 5 ML: at 14:06

## 2025-01-15 RX ADMIN — FUROSEMIDE 40 MG: 40 TABLET ORAL at 08:27

## 2025-01-15 RX ADMIN — ATORVASTATIN CALCIUM 20 MG: 20 TABLET, FILM COATED ORAL at 22:09

## 2025-01-15 RX ADMIN — ACETAMINOPHEN 975 MG: 325 TABLET, FILM COATED ORAL at 23:28

## 2025-01-15 RX ADMIN — LETROZOLE 2.5 MG: 2.5 TABLET ORAL at 08:28

## 2025-01-15 RX ADMIN — PHYTONADIONE 5 MG: 10 INJECTION, EMULSION INTRAMUSCULAR; INTRAVENOUS; SUBCUTANEOUS at 08:28

## 2025-01-15 ASSESSMENT — ACTIVITIES OF DAILY LIVING (ADL)
ADLS_ACUITY_SCORE: 34
ADLS_ACUITY_SCORE: 49
ADLS_ACUITY_SCORE: 49
ADLS_ACUITY_SCORE: 34
ADLS_ACUITY_SCORE: 35
ADLS_ACUITY_SCORE: 34
ADLS_ACUITY_SCORE: 49
ADLS_ACUITY_SCORE: 34
ADLS_ACUITY_SCORE: 34
ADLS_ACUITY_SCORE: 49
ADLS_ACUITY_SCORE: 34
ADLS_ACUITY_SCORE: 34
ADLS_ACUITY_SCORE: 35
ADLS_ACUITY_SCORE: 49
ADLS_ACUITY_SCORE: 34
ADLS_ACUITY_SCORE: 34
ADLS_ACUITY_SCORE: 49

## 2025-01-15 NOTE — PROGRESS NOTES
[facility]    PROGRESS NOTE - Hospitalist Service    ASSESSMENT AND PLAN     Active Problems:    Bradycardia    Junctional bradycardia    ALEXEI (acute kidney injury)    Gastrointestinal hemorrhage with melena    Anemia, unspecified type    Kayley Putnam is a 89 year old female with history of MDS and breast cancer who was directed to the ED at [facility] due to concern for severe, recurrent anemia with ALEXEI noted on lab draw today at [facility] in [place] (Dr. Vince Norton).      Acute on chronic Anemia   In the setting of known MDS  Concern for UGI bleed- GI evaluated and recommended no scoping at this time but to optimize Coaguloapthy status   Melenic stool based on Dr. Agee' description.   Received 2 units PRBCs in the ED with improvement of HGB from 5.2 to 7.6  Trend HGB and replace for HGB <7.0  CLD advance a tolerated   PPI    Pancytopenia related to MDS.   PLT of 21 on arrival   Received 1 unit PLT  Oncology recommending transfusion for PLT <10 or for active bleeding     Atrial fibrillation  Bradycardia noted   Monitoring   Holding home metoprolol     Acute hypoxic respiratory insufficiency  Wean oxygen  Likely related to above    ALEXEI  Baseline creat is about 1.0.    Creat of 1.57   Trend- may improve after 2 units PRBCs administerd     VHD   CHF   Mod Aortic regurgitation and moderate to severe Mitral calcification with mild mitral stenosis, Mod TR and pulm HTN based on Echo from 2021  No signs of acute heart failure  Continue home lasix     Barriers to discharge: HGB stabilization    Anticipated length of stay: 1 more night     Discussed case with GI and oncology. Oncology recommended no scoping. Stated it would be reasonable to discharge if patient's HGB  remained stable tomorrow. GI with no plans to scope at this time    Medically Ready for Discharge: Anticipated Tomorrow    Clinically Significant Risk Factors Present on Admission         # Hyponatremia: Lowest Na = 133 mmol/L in last 2  days, will monitor as appropriate        # Coagulation Defect: INR = 1.23 (Ref range: 0.85 - 1.15) and/or PTT = N/A, will monitor for bleeding  # Thrombocytopenia: Lowest platelets = 19 in last 2 days, will monitor for bleeding   # Hypertension: Noted on problem list      # Anemia: based on hgb <11  # Anemia: based on hgb <11                  Subjective:  Patient resting in bed. Hard of hearing. Originally not interested in scoping. States she feels less weak after receiving blood transfusion. No other complaints.     PHYSICAL EXAM  Temp:  [97.2  F (36.2  C)-98.5  F (36.9  C)] 98.1  F (36.7  C)  Pulse:  [36-71] 58  Resp:  [0-33] 22  BP: (115-146)/(35-69) 144/51  SpO2:  [91 %-99 %] 97 %  Wt Readings from Last 1 Encounters:   01/15/25 59.9 kg (132 lb 0.9 oz)       Intake/Output Summary (Last 24 hours) at 1/15/2025 1256  Last data filed at 1/15/2025 1025  Gross per 24 hour   Intake 350 ml   Output 1600 ml   Net -1250 ml      Body mass index is 22.32 kg/m .    GENRL: Alert and answering questions appropriately. Not in acute distress. Lying in bed   CHEST: Clear to auscultation bilaterally. No wheezes, rhonchi or crackles. Breathing easily   HEART: Bradycardic. + murmur   ABDMN: Soft. Non-tender, Bowel sounds present   EXTRM: No pedal edema  NEURO: No focal neurological deficit. No involuntary movements. Normal mentation  PSYCH: Normal affect and mood.   INTGM: No skin rash    Medical Decision Making       55 MINUTES SPENT BY ME on the date of service doing chart review, history, exam, documentation & further activities per the note.      PERTINENT LABS/IMAGING:  No results found for this visit on 01/14/25.  Most Recent 3 CBC's:  Recent Labs   Lab Test 01/15/25  0800 01/14/25  1555 11/22/24  1136   WBC 1.4* 1.8* 1.3*   HGB 7.6* 5.2* 7.6*   * 115* 118*   PLT 19* 21* 26*     Most Recent 3 BMP's:  Recent Labs   Lab Test 01/15/25  0800 01/14/25  1555 02/24/20  0806   * 133* 136   POTASSIUM 4.7 4.8 4.2   CHLORIDE  "99 101 105   CO2 21* 18* 26   BUN 67.4* 75.2* 22   CR 1.57* 1.54* 0.97   ANIONGAP 14 14 5   NATALIA 9.1 9.3 8.5   GLC 94 121* 97     Most Recent 2 LFT's:  Recent Labs   Lab Test 01/15/25  0800   AST 10   ALT 9   ALKPHOS 86   BILITOTAL 1.2       No results for input(s): \"CHOL\", \"HDL\", \"LDL\", \"TRIG\", \"CHOLHDLRATIO\" in the last 68793 hours.  No results for input(s): \"LDL\" in the last 89829 hours.  Recent Labs   Lab Test 01/15/25  0800   *   POTASSIUM 4.7   CHLORIDE 99   CO2 21*   GLC 94   BUN 67.4*   CR 1.57*   GFRESTIMATED 31*   NATALIA 9.1     No results for input(s): \"A1C\" in the last 67310 hours.  Recent Labs   Lab Test 01/15/25  0800 01/14/25  1555 11/22/24  1136   HGB 7.6* 5.2* 7.6*     No results for input(s): \"TROPONINI\" in the last 07455 hours.  No results for input(s): \"BNP\", \"NTBNPI\", \"NTBNP\" in the last 18520 hours.  No results for input(s): \"TSH\" in the last 47624 hours.  Recent Labs   Lab Test 01/14/25  1555 11/22/24  1136 05/24/22  0912   INR 1.23* 1.14 1.40*       Hilda Albert, DO  Hospitalist Service  [facility]      "

## 2025-01-15 NOTE — PLAN OF CARE
[facility]    ED Boarding Nurse Handoff Addendum Report:    Date/time: 1/15/2025, 2:10 AM    Neuro: Alert and Oriented x4  Activity: have not been out of bed yet  Telemetry Monitoring: Yes - bradycardic and A-fib  Pain: complaining of 4/10 pain in their shoulder.  Tylenol given for pain.  Labs / Tests: 5.2 Hbg, 21 Plts  O2: Oxymask 2L  LDA's: Port  Fluids: is Saline locked.  Diet: NPO  Consults: GI    Plan of Care:    2 units RBCs and 1 unit Plts given. GI consult. Q12 Protonix. Discussed low HRs with [place], recommended oxygen support now on Oxymask 2L.     Vital signs (within last 30 minutes):    Vitals:    01/14/25 2306 01/14/25 2323 01/15/25 0152 01/15/25 0344   BP: 129/55 136/64 (!) 146/61 130/50   BP Location:   Right arm Right arm   Patient Position:   Supine Supine   Cuff Size:   Adult Regular Adult Regular   Pulse: 55 55 61 52   Resp: 18 20 22 18   Temp: 98.1  F (36.7  C) 98.4  F (36.9  C) 98.5  F (36.9  C) 98.2  F (36.8  C)   TempSrc: Oral Oral Oral Oral   SpO2: 94% 94% 93% 97%   Weight:       Height:            ED Boarding Nurse name: Jackelyn Mejia RN

## 2025-01-15 NOTE — CONSULTS
GASTROENTEROLOGY CONSULTATION      Kayley Putnam  [address]   [place] [id number]  89 year old female     Admission Date/Time: 1/14/2025  Primary Care Provider: Taj Gordillo  Referring / Attending Physician:  Dr. Resendez     We were asked to see the patient in consultation by Dr. Resendez for evaluation of melena        HPI:  Kayley Putnam is a 89 year old female with medical history of MDS of breast cancer, directly admitted to the hospital for concern of severe, recurrent anemia with acute kidney injury seen on lab draw at [facility].  Gastroenterology was contacted given anemia and melena.    Patient lives independently.  She found that she was very short of breath with activity and was experiencing some dizziness.  Hemoglobin on November 22, 2024 was 7.6.  Pancytopenic at that time as well.  On admission hemoglobin was 5.2, , WBC 1.8, platelet count 21.  Patient denies any abdominal pain.  No nausea or vomiting.  Rectal exam in the emergency room did indicate melanotic stool.  She was given 2 units of packed red cells and 1 unit of platelets. No further bowel movement since admission to the hospital.  Blood pressure stable.  Movement has improved to 1.6.  PLTs are now 19K.    Patient has no history of GI bleeding.  She has not been using NSAIDs nor aspirin.  Not smoking and does not drink alcohol.  She is somewhat apprehensive to go through with any type of procedure would like further conversation if an upper endoscopy would be indicated.       PAST MEDICAL HISTORY:  Patient Active Problem List    Diagnosis Date Noted    Bradycardia 01/14/2025     Priority: Medium    Junctional bradycardia 01/14/2025     Priority: Medium    ALEXEI (acute kidney injury) 01/14/2025     Priority: Medium    Gastrointestinal hemorrhage with melena 01/14/2025     Priority: Medium    Anemia, unspecified type 01/14/2025     Priority: Medium    MDS (myelodysplastic syndrome) (H) 07/24/2023     Priority:  Medium    Breast hematoma 02/24/2020     Priority: Medium    Hematoma of breast 02/21/2020     Priority: Medium    Malignant neoplasm of right female breast, unspecified estrogen receptor status, unspecified site of breast (H) 02/04/2020     Priority: Medium     Added automatically from request for surgery [id number]      Anemia 04/25/2018     Priority: Medium    Diarrhea 03/15/2018     Priority: Medium    AF (atrial fibrillation) (H)      Priority: Medium    Atherosclerosis of aorta      Priority: Medium    Hyperlipidemia      Priority: Medium    HTN (hypertension)      Priority: Medium    CAD (coronary artery disease)      Priority: Medium    Overweight and obesity(278.0)      Priority: Medium          ROS: A comprehensive ten point review of systems was negative aside from those in mentioned in the HPI.       MEDICATIONS:   Prior to Admission medications    Medication Sig Start Date End Date Taking? Authorizing Provider   acetaminophen (TYLENOL) 500 MG tablet Take 500 mg by mouth every 6 hours as needed for mild pain.   Yes Unknown, Entered By History   amoxicillin (AMOXIL) 500 MG capsule Take 2,000 mg by mouth as needed (Dental Appointments). 3/4/14  Yes Reported, Patient   atorvastatin (LIPITOR) 20 MG tablet Take 20 mg by mouth At Bedtime    Yes Reported, Patient   furosemide (LASIX) 20 MG tablet Take 40 mg by mouth daily.   Yes Reported, Patient   letrozole (FEMARA) 2.5 MG tablet Take 1 tablet by mouth daily. 11/13/24  Yes Unknown, Entered By History   luspatercept-aamt (REBLOZYL) 25 MG injection Inject 1 mg/kg subcutaneously every 21 days.   Yes Unknown, Entered By History   metoprolol (TOPROL-XL) 25 MG 24 hr tablet Take 25 mg by mouth 2 times daily   Yes Reported, Patient   spironolactone (ALDACTONE) 25 MG tablet Take 1 tablet by mouth daily. 5/29/24  Yes Unknown, Entered By History        ALLERGIES:   Allergies   Allergen Reactions    Amlodipine Dizziness    Codeine Nausea    Flecainide Dizziness     "Hydrochlorothiazide Other (See Comments)     Other reaction(s): Hyponatremia    Meperidine Nausea and Vomiting    Promethazine      Jittery    Ceftin [Cefuroxime] Itching and Rash    Tetracycline Rash    Vancomycin Itching and Rash        SOCIAL HISTORY:  Social History     Tobacco Use    Smoking status: Never    Smokeless tobacco: Never   Substance Use Topics    Alcohol use: Yes     Alcohol/week: 0.0 standard drinks of alcohol     Comment: occasional - wine    Drug use: Never        FAMILY HISTORY:  Family History   Problem Relation Age of Onset    Cerebrovascular Disease Father         PHYSICAL EXAM:     /50   Pulse (!) 47   Temp 98.2  F (36.8  C) (Oral)   Resp 18   Ht 1.626 m (5' 4\")   Wt 61.2 kg (135 lb)   SpO2 94%   BMI 23.17 kg/m       PHYSICAL EXAM:  GENERAL:  Elderly female, NAD  SKIN: no suspicious lesions, rashes, jaundice  HEAD: Normocephalic. Atraumatic.  NECK: Neck supple. No adenopathy.   EYES: No scleral icterus  GASTROINTESTINAL: soft, non tender, non distended, no guarding/rebound  JOINT/EXTREMITIES:  no gross deformities noted, normal muscle tone  NEURO: CN 2-12 grossly intact, no focal deficits  PSYCH: Normal affect        ADDITIONAL COMMENTS:   I reviewed the patient's new clinical lab test results.     Recent Labs   Lab 01/15/25  0800 01/14/25  1555   WBC 1.4* 1.8*   RBC 2.23* 1.44*   HGB 7.6* 5.2*   HCT 22.7* 16.6*   * 115*   MCH 34.1* 36.1*   MCHC 33.5 31.3*   RDW 26.6*  --    PLT 19* 21*     Recent Labs   Lab Test 01/15/25  0800 01/14/25  1555 02/24/20  0806   POTASSIUM 4.7 4.8 4.2   CHLORIDE 99 101 105   CO2 21* 18* 26   BUN 67.4* 75.2* 22   ANIONGAP 14 14 5     Recent Labs   Lab Test 01/15/25  0800   ALBUMIN 3.9   BILITOTAL 1.2   ALT 9   AST 10       Recent Labs   Lab 01/14/25  1555   INR 1.23*        CONSULTATION ASSESSMENT AND PLAN:    Kayley Putnam is a 89 year old female with medical history of MDS of breast cancer, directly admitted to the hospital for concern of " severe, recurrent anemia with acute kidney injury seen on lab draw at [facility].  Gastroenterology was contacted given anemia and melena.    1.  Melanotic stool: Hemoglobin has responded well to 2 units of packed red cells and is currently 7.6.  She received 1 unit of platelets however her platelets have actually decreased to 19K.  INR is 1.23 (given oral vitamin K). No melanotic stool since admission.  BUN is elevated. She is somewhat apprehensive to go through with any type of procedure would like further conversation if an upper endoscopy would be indicated.    -- Given good response of hemoglobin, will continue to monitor for overt GI bleeding today.  Continue to monitor closely for need for EGD although quite high risk given her comorbidities/pancytopenia  -- Suggest working with oncology for patient to receive another unit of platelets.  -- Serial Hemoglobin, transfusions per medicine team  -- PPI twice a day  -- Document stool output and color.      2.  Pancytopenia: Patient has MDS and is troubled with pancytopenia for a number of months.  She is followed by [facility].    I discussed the patient plan with Dr. Arango, GI staff physician. Thank you for asking us to participate in the care of this patient.    70 min of total time was spent providing patient care, including patient evaluation, reviewing documentation/ test results, and .     Florida Lopez PA-C  [facility] ( [facility])

## 2025-01-15 NOTE — H&P
"[facility] History and Physical    Kayley Putnam MRN# [id number]   Age: 89 year old YOB: 1933     Date of Admission:  1/14/2025    Home clinic: [facility]  Primary care provider: Taj Gordillo          Assessment and Plan:   Assessment:   Kayley Putnam is a 89 year old woman with history of MDS and breast cancer who was directed to the ED at [facility] due to concern for severe, recurrent anemia with ALEXEI noted on lab draw today at [facility] in [place] (Dr. Vince Norton).     On presentation to the ED, VS: /35, HR 65, RR 20 with oxygen saturation 94% breathing room air.  Afebrile.  Over time, patient's heart rate drifted down as low as the high 30s but she did not appear to have significant hypotension with those episodes.  Examination:alert, pleasantly interactive and fully appropriate. I note that Dr. Agee describes \"black and sticky stool\" noted with digital rectal exam. Slightly IRRIR with coarse sounding murmur radiating to the left axilla.  Labs: Creatinine 1.54/BUN 75, sodium 133, CO2 18.  Stool guaiac is positive.  WBC 1.8 with ANC 1.0, Hgb 5.2, PLT 21.  INR 1.23.  EKG shows atrial fibrillation with slow ventricular response.  Interventions in the emergency department: Patient was typed and screened for 2 units packed red blood cells.    DX:  Acute on chronic, now symptomatic Anemia in the setting of suspected UGI bleed and known MDS. Evidence of melenic stool based on Dr. Agee' description.   Pancytopenia (including thrombocytopenia) related to MDS.   Atrial fibrillation, now with observed and (unclear whether this is symptomatic) bradycardia.   ALEXEI.  Baseline creat is about 1.0.  Question whether the creatinine accurately reflects the GFR.  Suspect a component of hypovolemia.  Heart Murmur: mod Aortic regurgitation and moderate to severe Mitral calcification with mild mitral stenosis, Mod TR and pulm HTN based on Echo from 2021.    " "  Plan:   Admit to medical telemetry.  Transfuse 2 units PRBC at this time. I contacted Dr. Norton and confirmed that 1 unit of Platelets are appropriate in the setting of acute active bleeding.   GI consultation.  Pt will be NPO after midnight for possible EGD, though I recognize that GI would prefer to avoid exam if possible.   Protonix 40 mg IV q 12.   Usual home meds reviewed and resumed with Metoprolol XL held at this time. Will continue Lasix orally as at home, due to the big solute load she is being given with two units of PRBC.             Chief Complaint:     Symptomatic anemia     History is obtained from the patient, electronic health record, and emergency department physician     Kayley L Putnam was directed to the ED today from her residence, where she is in Independent Living.  She has been feeling very dyspneic with activity in the past couple of weeks and has noted several episodes when she had orthostatic dizziness.     Although she had incidentally noted that she has had \"darker BMs\" in the past couple of weeks, she did not think it reflected GI bleeding. Her appetite has been poor for months now and she indicates she has lost about 50 lbs in the past year. She reports early satiety, but without epigastric pain or discomfort. No N/V.      She denies orthopnea and PND. No cough or SOB at rest. No Chest pain, but she has noted that her heartbeat is \"pounding\".    Although the pt has been thrombocytopenic and anemic requiring PRBC transfusion multiple times in the past couple of years, she has not had platelet transfusion. She has been off of anticoagulation (for A fib) for the past several years.     She reports she ambulates independently at baseline with a walker and sometimes uses an electric wheel chair.        Past Medical History:     Past Medical History:   Diagnosis Date    AF (atrial fibrillation) (H)     Atherosclerosis of aorta     CAD (coronary artery disease)     History of blood " transfusion 02/22/2020    after and I&D - blood clot in breast after a biopsy    HTN (hypertension)     Hyperlipidemia     Myelodysplastic syndrome (H)     Overweight and obesity(278.0)     PONV (postoperative nausea and vomiting)              Past Surgical History:      Past Surgical History:   Procedure Laterality Date    APPENDECTOMY  1958    ENT SURGERY      tonsillectomy and adenoidectomy as a child    GYN SURGERY  1974    total hysterectomy     IR CHEST PORT PLACEMENT > 5 YRS OF AGE  5/24/2022    IRRIGATION AND DEBRIDEMENT BREAST Right 2/22/2020    Procedure: IRRIGATION AND DEBRIDEMENT, BREAST;  Surgeon: Avery Gloria MD;  Location: [facility] OR    IRRIGATION AND DEBRIDEMENT BREAST Right 2/22/2020    Procedure: IRRIGATION AND DEBRIDEMENT, BREAST;  Surgeon: Avery Gloria MD;  Location: [facility] OR    LUMPECTOMY BREAST, SEED LOCALIZATION, SENTINEL NODE Right 3/4/2020    Procedure: RIGHT BREAST SEED LOCALIZED LUMPECTOMY WITH RIGHT SENTINEL NODE BIOPSY;  Surgeon: Lali Pope MD;  Location: [facility] OR    ORTHOPEDIC SURGERY  2003, 2009    has had both knees replaced             Social History:     Social History     Tobacco Use    Smoking status: Never    Smokeless tobacco: Never   Substance Use Topics    Alcohol use: Yes     Alcohol/week: 0.0 standard drinks of alcohol     Comment: occasional - wine            Immunizations:     Immunization History   Administered Date(s) Administered    COVID-19 Bivalent 12+ (Pfizer) 09/15/2022    COVID-19 MONOVALENT 12+ (Pfizer) 02/08/2021, 03/01/2021, 01/04/2022             Allergies:     Allergies   Allergen Reactions    Amlodipine Dizziness    Codeine Nausea    Flecainide Dizziness    Hydrochlorothiazide Other (See Comments)     Other reaction(s): Hyponatremia    Meperidine Nausea and Vomiting    Promethazine      Jittery    Ceftin [Cefuroxime] Itching and Rash    Tetracycline Rash    Vancomycin Itching and Rash             Medications:   Letrozole 2.5 mg daily  Spironolactone 25  mg daily  Atorvastatin 20 mg daily  Furosemide 40 mg daily  Metoprolol XL 25 mg BID         Review of Systems:     A comprehensive review of systems was performed and found to be negative except as described in this note           Physical Exam:   Vitals were reviewed  Temp: 98  F (36.7  C) Temp src: Temporal BP: 126/47 Pulse: 60   Resp: 20 SpO2: 95 % O2 Device: None (Room air)    Constitutional: Awake, alert, cooperative, no apparent distress, and appears stated age.  Very pleasant and calm.   Eyes: Lids and lashes normal, pupils equal, round and reactive to light, extra ocular muscles intact, sclera clear, conjunctiva normal.  ENT: Normocephalic, without obvious abnormality, atraumatic. No facial muscular asymmetry.  Neck: Supple, symmetrical, trachea midline, no adenopathy, thyroid symmetric, not enlarged and no tenderness, skin normal.  Hematologic / Lymphatic: No cervical lymphadenopathy and no supraclavicular lymphadenopathy.  Back: Symmetric, no curvature, spinous processes are non-tender on palpation, paraspinous muscles are non-tender on palpation, no costal vertebral tenderness.  Kyphosis noted.   Lungs: No increased work of breathing, good air exchange, clear to auscultation bilaterally, no crackles or wheezing.  Cardiovascular: slow, but mildly irregularly irregular HR with a coarse-sounding murmur heard widely over the precordium and radiating in to the left axilla.   Abdomen: Normal bowel sounds, soft, non-distended, non-tender, no masses palpated, no hepatosplenomegaly.  Musculoskeletal: No redness, warmth, or swelling of the joints. Trace, if any, edema.  Tone is normal.  Neurologic: Awake, alert, oriented to name, place and time.  Cranial nerves II-XII are grossly intact.  Motor is 5 out of 5 bilaterally.    Neuropsychiatric: Normal affect, mood, orientation, memory and insight.  Skin: No rashes, erythema, pallor, petechia or purpura.          Data:     Results for orders placed or performed during  the hospital encounter of 01/14/25 (from the past 24 hours)   EKG 12-lead, tracing only   Result Value Ref Range    Systolic Blood Pressure  mmHg    Diastolic Blood Pressure  mmHg    Ventricular Rate 55 BPM    Atrial Rate  BPM    CO Interval  ms    QRS Duration 98 ms     ms    QTc 443 ms    P Axis  degrees    R AXIS -46 degrees    T Axis 33 degrees    Interpretation ECG       Atrial fibrillation with slow ventricular response  Left anterior fascicular block  Minimal voltage criteria for LVH, may be normal variant ( Axel product )  Nonspecific ST abnormality  Abnormal ECG  When compared with ECG of 21-Feb-2020 21:21,  T wave inversion no longer evident in Lateral leads  Unconfirmed report - interpretation of this ECG is computer generated - see medical record for final interpretation  Confirmed by - EMERGENCY ROOM, PHYSICIAN ([id number]),  Porfirio Linder ([id number]) on 1/14/2025 3:49:55 PM     Prepare red blood cells (unit)   Result Value Ref Range    Blood Component Type Red Blood Cells     Product Code U2825I00     Unit Status Transfused     Unit Number [id number]     CROSSMATCH Compatible     CODING SYSTEM [id number]     ISSUE DATE AND TIME 81992450310384     UNIT ABO/RH A+     UNIT TYPE ISBT 6200    Prepare red blood cells (unit)   Result Value Ref Range    Blood Component Type Red Blood Cells     Product Code X9904S32     Unit Status Issued     Unit Number [id number]     CROSSMATCH Compatible     CODING SYSTEM [id number]     ISSUE DATE AND TIME [id number]     UNIT ABO/RH A+     UNIT TYPE ISBT 6200    CBC + differential    Narrative    The following orders were created for panel order CBC + differential.  Procedure                               Abnormality         Status                     ---------                               -----------         ------                     CBC with platelets and d...[[id number]]  Abnormal            Final result               Manual Differential[[id number]]           Abnormal            Final result                 Please view results for these tests on the individual orders.   Basic metabolic panel (BMP)   Result Value Ref Range    Sodium 133 (L) 135 - 145 mmol/L    Potassium 4.8 3.4 - 5.3 mmol/L    Chloride 101 98 - 107 mmol/L    Carbon Dioxide (CO2) 18 (L) 22 - 29 mmol/L    Anion Gap 14 7 - 15 mmol/L    Urea Nitrogen 75.2 (H) 8.0 - 23.0 mg/dL    Creatinine 1.54 (H) 0.51 - 0.95 mg/dL    GFR Estimate 32 (L) >60 mL/min/1.73m2    Calcium 9.3 8.8 - 10.4 mg/dL    Glucose 121 (H) 70 - 99 mg/dL   ABO/Rh type and screen    Narrative    The following orders were created for panel order ABO/Rh type and screen.  Procedure                               Abnormality         Status                     ---------                               -----------         ------                     Adult Type and Screen[[id number]]                            Final result                 Please view results for these tests on the individual orders.   Extra Tube ([place] Draw)    Narrative    The following orders were created for panel order Extra Tube ([place] Draw).  Procedure                               Abnormality         Status                     ---------                               -----------         ------                     Extra Blue Top Tube[[id number]]                                                         Extra Red Top Tube[[id number]]                               Final result                 Please view results for these tests on the individual orders.   INR   Result Value Ref Range    INR 1.23 (H) 0.85 - 1.15   CBC with platelets and differential   Result Value Ref Range    WBC Count 1.8 (L) 4.0 - 11.0 10e3/uL    RBC Count 1.44 (L) 3.80 - 5.20 10e6/uL    Hemoglobin 5.2 (LL) 11.7 - 15.7 g/dL    Hematocrit 16.6 (L) 35.0 - 47.0 %     (H) 78 - 100 fL    MCH 36.1 (H) 26.5 - 33.0 pg    MCHC 31.3 (L) 31.5 - 36.5 g/dL    RDW      Platelet Count 21 (LL) 150 - 450 10e3/uL   Adult Type  and Screen   Result Value Ref Range    ABO/RH(D) A POS     Antibody Screen Negative Negative    SPECIMEN EXPIRATION DATE [id number]    Extra Red Top Tube   Result Value Ref Range    Hold Specimen JIC    Manual Differential   Result Value Ref Range    % Neutrophils 57 %    % Lymphocytes 26 %    % Monocytes 15 %    % Eosinophils 1 %    % Basophils 1 %    Absolute Neutrophils 1.0 (L) 1.6 - 8.3 10e3/uL    Absolute Lymphocytes 0.5 (L) 0.8 - 5.3 10e3/uL    Absolute Monocytes 0.3 0.0 - 1.3 10e3/uL    Absolute Eosinophils 0.0 0.0 - 0.7 10e3/uL    Absolute Basophils 0.0 0.0 - 0.2 10e3/uL    NRBCs per 100 WBC 3 %    Absolute NRBCs 0.1 10e3/uL    RBC Morphology Confirmed RBC Indices     Platelet Assessment  Automated Count Confirmed. Platelet morphology is normal.     Automated Count Confirmed. Platelet morphology is normal.    Elliptocytes Slight (A) None Seen    Reactive Lymphocytes Present (A) None Seen    Teardrop Cells Slight (A) None Seen   WBC and differential *Canceled*    Narrative    The following orders were created for panel order WBC and differential.  Procedure                               Abnormality         Status                     ---------                               -----------         ------                     WBC and Differential[[id number]]                                                          Please view results for these tests on the individual orders.   Occult blood stool   Result Value Ref Range    Occult Blood Positive (A) Negative   EKG 12 lead   Result Value Ref Range    Systolic Blood Pressure  mmHg    Diastolic Blood Pressure  mmHg    Ventricular Rate 35 BPM    Atrial Rate  BPM    IA Interval  ms    QRS Duration 92 ms     ms    QTc 384 ms    P Axis  degrees    R AXIS -45 degrees    T Axis -20 degrees    Interpretation ECG       Junctional bradycardia  Left anterior fascicular block  Abnormal ECG  When compared with ECG of 14-Jan-2025 15:27,  Junctional rhythm has replaced Atrial  fibrillation  Vent. rate has decreased by  20 bpm  Inverted T waves have replaced nonspecific T wave abnormality in Inferior leads  QT has shortened     WBC and differential *Canceled*    Narrative    The following orders were created for panel order WBC and differential.  Procedure                               Abnormality         Status                     ---------                               -----------         ------                       Please view results for these tests on the individual orders.   Prepare pheresed platelets (unit)   Result Value Ref Range    Blood Component Type Platelets     Product Code A1457B72     Unit Status Ready for issue     Unit Number [id number]     CODING SYSTEM ZZUD232       All cardiac studies reviewed by me.       Attestation:  I have reviewed today's vital signs, notes, medications, labs and imaging.     Kaushal Resendez MD

## 2025-01-15 NOTE — PHARMACY-ADMISSION MEDICATION HISTORY
Pharmacy Intern Admission Medication History    Admission medication history is complete. The information provided in this note is only as accurate as the sources available at the time of the update.    Information Source(s): Patient and CareEverywhere/SureScripts via in-person    Pertinent Information: Patient takes     Changes made to PTA medication list:  Added: Letrozole and spironolactone  Deleted: ARANESP, Vi C, CALTRATE, senna s, warfarin  Changed:   Tylenol 325 mg-->500 mg    Allergies reviewed with patient and updates made in EHR: yes    Medication History Completed By: Taz Herzog 1/14/2025 7:28 PM    PTA Med List   Medication Sig Last Dose/Taking    acetaminophen (TYLENOL) 500 MG tablet Take 500 mg by mouth every 6 hours as needed for mild pain. 1/13/2025 Bedtime    amoxicillin (AMOXIL) 500 MG capsule Take 2,000 mg by mouth as needed (Dental Appointments). Taking As Needed    atorvastatin (LIPITOR) 20 MG tablet Take 20 mg by mouth At Bedtime  1/13/2025 Bedtime    furosemide (LASIX) 20 MG tablet Take 40 mg by mouth daily. 1/13/2025 Morning    letrozole (FEMARA) 2.5 MG tablet Take 1 tablet by mouth daily. 1/14/2025 Morning    luspatercept-aamt (REBLOZYL) 25 MG injection Inject 1 mg/kg subcutaneously every 21 days. 1/7/2025    metoprolol (TOPROL-XL) 25 MG 24 hr tablet Take 25 mg by mouth 2 times daily 1/14/2025 Morning    spironolactone (ALDACTONE) 25 MG tablet Take 1 tablet by mouth daily. 1/14/2025 Morning

## 2025-01-15 NOTE — PLAN OF CARE
"Goal Outcome Evaluation:      Plan of Care Reviewed With: patient    Overall Patient Progress: improvingOverall Patient Progress: improving    Outcome Evaluation: Weaned off O2. Denies pain. Hgb up to 8.2. Port HL. Tolerated clear liquids, advanced to fulls for dinner. Voiding. Up with A1 walker gait belt. GI and hem/onc following. Tele - AFib HR 50s.    /49 (BP Location: Left arm)   Pulse 52   Temp 97.6  F (36.4  C) (Oral)   Resp 23   Ht 1.638 m (5' 4.5\")   Wt 59.9 kg (132 lb 0.9 oz)   SpO2 96%   BMI 22.32 kg/m       Problem: Adult Inpatient Plan of Care  Goal: Plan of Care Review  Description: The Plan of Care Review/Shift note should be completed every shift.  The Outcome Evaluation is a brief statement about your assessment that the patient is improving, declining, or no change.  This information will be displayed automatically on your shift  note.  Outcome: Progressing  Flowsheets (Taken 1/15/2025 5127)  Outcome Evaluation: Weaned off O2. Denies pain. Hgb up to 8.2. Port HL. Tolerated clear liquids, advanced to fulls for dinner. Voiding. Up with A1 walker gait belt. GI and hem/onc following. Tele - AFib HR 50s.  Plan of Care Reviewed With: patient  Overall Patient Progress: improving  Goal: Patient-Specific Goal (Individualized)  Description: You can add care plan individualizations to a care plan. Examples of Individualization might be:  \"Parent requests to be called daily at 9am for status\", \"I have a hard time hearing out of my right ear\", or \"Do not touch me to wake me up as it startles  me\".  Outcome: Progressing  Goal: Absence of Hospital-Acquired Illness or Injury  Outcome: Progressing  Intervention: Identify and Manage Fall Risk  Recent Flowsheet Documentation  Taken 1/15/2025 1200 by Ericka Roberts RN  Safety Promotion/Fall Prevention: safety round/check completed  Intervention: Prevent Skin Injury  Recent Flowsheet Documentation  Taken 1/15/2025 1200 by Ericka Roberts, RN  Body Position: " supine, head elevated  Intervention: Prevent and Manage VTE (Venous Thromboembolism) Risk  Recent Flowsheet Documentation  Taken 1/15/2025 1200 by Ericka Roberts RN  VTE Prevention/Management: SCDs off (sequential compression devices)  Goal: Optimal Comfort and Wellbeing  Outcome: Progressing  Goal: Readiness for Transition of Care  Outcome: Progressing  Intervention: Mutually Develop Transition Plan  Recent Flowsheet Documentation  Taken 1/15/2025 1200 by Ericka Roberts RN  Equipment Currently Used at Home:   walker, rolling   walker, standard   raised toilet seat

## 2025-01-15 NOTE — ED NOTES
[facility]  ED Nurse Handoff Report    ED Chief complaint: Abnormal Labs  . ED Diagnosis: anemia  Final diagnoses:   Anemia, unspecified type   ALEXEI (acute kidney injury)   Bradycardia   Junctional bradycardia   Gastrointestinal hemorrhage with melena       Allergies:   Allergies   Allergen Reactions    Amlodipine Dizziness    Codeine Nausea    Flecainide Dizziness    Hydrochlorothiazide Other (See Comments)     Other reaction(s): Hyponatremia    Meperidine Nausea and Vomiting    Promethazine      Jittery    Ceftin [Cefuroxime] Itching and Rash    Tetracycline Rash    Vancomycin Itching and Rash       Code Status: Full Code    Activity level - Baseline/Home:  independent.  Activity Level - Current:   assist of 1.   Lift room needed: No.   Bariatric: No   Needed: No   Isolation: No.   Infection: Not Applicable.     Respiratory status: Room air    Vital Signs (within 30 minutes):   Vitals:    01/14/25 1755 01/14/25 1800 01/14/25 1801 01/14/25 1830   BP:  118/44  123/40   Pulse: (!) 46 (!) 48 55 55   Resp: 25 26 27 (!) 33   Temp:       TempSrc:       SpO2: 96% 95% 95% 91%   Weight:       Height:           Cardiac Rhythm:  ,      Pain level:    Patient confused: No.   Patient Falls Risk: nonskid shoes/slippers when out of bed, patient and family education, assistive device/personal items within reach, activity supervised, mobility aid in reach, room door open, and toileting schedule implemented.   Elimination Status:  has not had urge to void yet.      Patient Report - Initial Complaint: abnormal labs.   Focused Assessment: A/Ox4, on RA, independent at home, assist x1 BSC here s/t gen weakness, skin intact, power port RINA accessed/infusing. Denies CP, SOB, N/V/D.     Abnormal Results:   Labs Ordered and Resulted from Time of ED Arrival to Time of ED Departure   BASIC METABOLIC PANEL - Abnormal       Result Value    Sodium 133 (*)     Potassium 4.8      Chloride 101      Carbon Dioxide  (CO2) 18 (*)     Anion Gap 14      Urea Nitrogen 75.2 (*)     Creatinine 1.54 (*)     GFR Estimate 32 (*)     Calcium 9.3      Glucose 121 (*)    INR - Abnormal    INR 1.23 (*)    CBC WITH PLATELETS AND DIFFERENTIAL - Abnormal    WBC Count 1.8 (*)     RBC Count 1.44 (*)     Hemoglobin 5.2 (*)     Hematocrit 16.6 (*)      (*)     MCH 36.1 (*)     MCHC 31.3 (*)     RDW        Platelet Count 21 (*)    OCCULT BLOOD STOOL - Abnormal    Occult Blood Positive (*)    MANUAL DIFFERENTIAL - Abnormal    % Neutrophils 57      % Lymphocytes 26      % Monocytes 15      % Eosinophils 1      % Basophils 1      Absolute Neutrophils 1.0 (*)     Absolute Lymphocytes 0.5 (*)     Absolute Monocytes 0.3      Absolute Eosinophils 0.0      Absolute Basophils 0.0      NRBCs per 100 WBC 3      Absolute NRBCs 0.1      RBC Morphology Confirmed RBC Indices      Platelet Assessment        Value: Automated Count Confirmed. Platelet morphology is normal.    Elliptocytes Slight (*)     Reactive Lymphocytes Present (*)     Teardrop Cells Slight (*)    TYPE AND SCREEN, ADULT    ABO/RH(D) A POS      Antibody Screen Negative      SPECIMEN EXPIRATION DATE [id number]     PREPARE RED BLOOD CELLS (UNIT)    Blood Component Type Red Blood Cells      Product Code R0311A71      Unit Status Transfused      Unit Number [id number]      CROSSMATCH Compatible      CODING SYSTEM [id number]      ISSUE DATE AND TIME 20250114170700      UNIT ABO/RH A+      UNIT TYPE ISBT 6200     PREPARE RED BLOOD CELLS (UNIT)    Blood Component Type Red Blood Cells      Product Code X8777A78      Unit Status Ready for issue      Unit Number [id number]      CROSSMATCH Compatible      CODING SYSTEM JPXN936     PREPARE RED BLOOD CELLS (UNIT)   TRANSFUSE RED BLOOD CELLS (UNIT)   ABO/RH TYPE AND SCREEN        No orders to display       Treatments provided: power port accessed, PRBC transfusion, IV Protonix   Family Comments: daughter lives in [place].  OBS brochure/video  discussed/provided to patient:  N/A  ED Medications:   Medications   pantoprazole (PROTONIX) IV push injection 40 mg (has no administration in time range)   pantoprazole (PROTONIX) IV push injection 40 mg (40 mg Intravenous $Given 1/14/25 7830)       Drips infusing:  No  For the majority of the shift this patient was Green.   Interventions performed were n/a.    Sepsis treatment initiated: No    Cares/treatment/interventions/medications to be completed following ED care: GI consult, poss scope. Trend H/H.    ED Nurse Name: Kaley Fields RN  6:39 PM

## 2025-01-15 NOTE — PROGRESS NOTES
"[facility] Follow Up Note:    Assessment and Plan:  Kayley Putnam is a 89 year old female patient admitted with Anemia.    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 (1% blast present and stable)  -Receiving Luspatercept every 3 weeks  -Most recently possible increase in blasts, peripheral smear performed as outpatient, pending results    PLAN:  -Continue transfusion support  -Luspatercept  -No plans for chemotherapy or aggressive cares    2. GI bleed  -Dark stool was reported in the ER, stool occult positive.  Currently no active bleeding  -GI has been consulted and would recommend against an endoscopy    PLAN:  - GI keeping age and goals in mind would not recommend endoscopy at this point  -Continue Protonix    3.Pancytopenia related to MDS   -ANC is low, no evidence of neutropenic symptoms, antibiotics of neutropenic fever  -PRBC if hemoglobin less than 7  -PLT transfusion if less than 10 or actively bleeding    4. DNR/ DNI    Discharge home or back to living facility tomorrow if counts stable.  Discussed with primary team  Subjective:    Feeling back to baseline, no evidence of confusion, notices no further bleeding.      Labs:  All labs reviewed    CBC  Recent Labs   Lab 01/15/25  0800 01/14/25  1555   WBC 1.4* 1.8*   HGB 7.6* 5.2*   * 115*   PLT 19* 21*       CMP  Recent Labs   Lab 01/15/25  0800 01/14/25  1555   * 133*   POTASSIUM 4.7 4.8   CHLORIDE 99 101   CO2 21* 18*   ANIONGAP 14 14   GLC 94 121*   BUN 67.4* 75.2*   CR 1.57* 1.54*   GFRESTIMATED 31* 32*   NATALIA 9.1 9.3   MAG 1.7 1.7   PHOS 3.5 3.4   PROTTOTAL 6.0*  --    ALBUMIN 3.9  --    BILITOTAL 1.2  --    ALKPHOS 86  --    AST 10  --    ALT 9  --        INR  Recent Labs   Lab 01/14/25  1555   INR 1.23*       Blood CultureNo results for input(s): \"CULT\" in the last 168 hours.      Vince Norton MD  [facility]  1/15/2025 1:12 " PM     Total time 52 minutes, spent between patient, did reviewing chart, discussion with team

## 2025-01-16 ENCOUNTER — APPOINTMENT (OUTPATIENT)
Dept: PHYSICAL THERAPY | Facility: CLINIC | Age: OVER 89
DRG: 808 | End: 2025-01-16
Attending: INTERNAL MEDICINE
Payer: MEDICARE

## 2025-01-16 VITALS
TEMPERATURE: 97.7 F | HEART RATE: 77 BPM | WEIGHT: 132.06 LBS | BODY MASS INDEX: 22 KG/M2 | SYSTOLIC BLOOD PRESSURE: 108 MMHG | RESPIRATION RATE: 21 BRPM | DIASTOLIC BLOOD PRESSURE: 60 MMHG | OXYGEN SATURATION: 91 % | HEIGHT: 65 IN

## 2025-01-16 LAB
ANION GAP SERPL CALCULATED.3IONS-SCNC: 15 MMOL/L (ref 7–15)
BASOPHILS # BLD AUTO: 0 10E3/UL (ref 0–0.2)
BASOPHILS # BLD MANUAL: 0 10E3/UL (ref 0–0.2)
BASOPHILS NFR BLD AUTO: 0 %
BASOPHILS NFR BLD MANUAL: 0 %
BLASTS # BLD MANUAL: 0.1 10E3/UL
BLASTS NFR BLD MANUAL: 5 %
BUN SERPL-MCNC: 63.4 MG/DL (ref 8–23)
CALCIUM SERPL-MCNC: 9.3 MG/DL (ref 8.8–10.4)
CHLORIDE SERPL-SCNC: 96 MMOL/L (ref 98–107)
CREAT SERPL-MCNC: 1.57 MG/DL (ref 0.51–0.95)
DACRYOCYTES BLD QL SMEAR: SLIGHT
EGFRCR SERPLBLD CKD-EPI 2021: 31 ML/MIN/1.73M2
ELLIPTOCYTES BLD QL SMEAR: SLIGHT
EOSINOPHIL # BLD AUTO: 0 10E3/UL (ref 0–0.7)
EOSINOPHIL # BLD MANUAL: 0 10E3/UL (ref 0–0.7)
EOSINOPHIL NFR BLD AUTO: 1 %
EOSINOPHIL NFR BLD MANUAL: 0 %
ERYTHROCYTE [DISTWIDTH] IN BLOOD BY AUTOMATED COUNT: 25.8 % (ref 10–15)
GLUCOSE SERPL-MCNC: 138 MG/DL (ref 70–99)
HCO3 SERPL-SCNC: 21 MMOL/L (ref 22–29)
HCT VFR BLD AUTO: 24.9 % (ref 35–47)
HGB BLD-MCNC: 8.2 G/DL (ref 11.7–15.7)
HGB BLD-MCNC: 8.2 G/DL (ref 11.7–15.7)
IMM GRANULOCYTES # BLD: 0.1 10E3/UL
IMM GRANULOCYTES NFR BLD: 8 %
LYMPHOCYTES # BLD AUTO: 0.7 10E3/UL (ref 0.8–5.3)
LYMPHOCYTES # BLD MANUAL: 0.9 10E3/UL (ref 0.8–5.3)
LYMPHOCYTES NFR BLD AUTO: 47 %
LYMPHOCYTES NFR BLD MANUAL: 58 %
MAGNESIUM SERPL-MCNC: 1.6 MG/DL (ref 1.7–2.3)
MCH RBC QN AUTO: 34.3 PG (ref 26.5–33)
MCHC RBC AUTO-ENTMCNC: 32.9 G/DL (ref 31.5–36.5)
MCV RBC AUTO: 104 FL (ref 78–100)
MONOCYTES # BLD AUTO: 0.3 10E3/UL (ref 0–1.3)
MONOCYTES # BLD MANUAL: 0.1 10E3/UL (ref 0–1.3)
MONOCYTES NFR BLD AUTO: 18 %
MONOCYTES NFR BLD MANUAL: 6 %
MYELOCYTES # BLD MANUAL: 0 10E3/UL
MYELOCYTES NFR BLD MANUAL: 2 %
NEUTROPHILS # BLD AUTO: 0.4 10E3/UL (ref 1.6–8.3)
NEUTROPHILS # BLD MANUAL: 0.4 10E3/UL (ref 1.6–8.3)
NEUTROPHILS NFR BLD AUTO: 27 %
NEUTROPHILS NFR BLD MANUAL: 29 %
NRBC # BLD AUTO: 0 10E3/UL
NRBC # BLD AUTO: 0.1 10E3/UL
NRBC BLD AUTO-RTO: 7 /100
NRBC BLD MANUAL-RTO: 2 %
PATH REV: ABNORMAL
PHOSPHATE SERPL-MCNC: 3.6 MG/DL (ref 2.5–4.5)
PLAT MORPH BLD: ABNORMAL
PLATELET # BLD AUTO: 21 10E3/UL (ref 150–450)
POTASSIUM SERPL-SCNC: 3.9 MMOL/L (ref 3.4–5.3)
POTASSIUM SERPL-SCNC: 4.2 MMOL/L (ref 3.4–5.3)
RBC # BLD AUTO: 2.39 10E6/UL (ref 3.8–5.2)
RBC MORPH BLD: ABNORMAL
SMUDGE CELLS BLD QL SMEAR: PRESENT
SODIUM SERPL-SCNC: 132 MMOL/L (ref 135–145)
WBC # BLD AUTO: 1.5 10E3/UL (ref 4–11)

## 2025-01-16 PROCEDURE — 85025 COMPLETE CBC W/AUTO DIFF WBC: CPT | Performed by: INTERNAL MEDICINE

## 2025-01-16 PROCEDURE — 250N000011 HC RX IP 250 OP 636: Performed by: INTERNAL MEDICINE

## 2025-01-16 PROCEDURE — 250N000013 HC RX MED GY IP 250 OP 250 PS 637: Performed by: INTERNAL MEDICINE

## 2025-01-16 PROCEDURE — 84132 ASSAY OF SERUM POTASSIUM: CPT | Performed by: INTERNAL MEDICINE

## 2025-01-16 PROCEDURE — 97530 THERAPEUTIC ACTIVITIES: CPT | Mod: GP

## 2025-01-16 PROCEDURE — 85018 HEMOGLOBIN: CPT | Performed by: INTERNAL MEDICINE

## 2025-01-16 PROCEDURE — 85007 BL SMEAR W/DIFF WBC COUNT: CPT | Performed by: INTERNAL MEDICINE

## 2025-01-16 PROCEDURE — 80048 BASIC METABOLIC PNL TOTAL CA: CPT | Performed by: INTERNAL MEDICINE

## 2025-01-16 PROCEDURE — 120N000001 HC R&B MED SURG/OB

## 2025-01-16 PROCEDURE — 99233 SBSQ HOSP IP/OBS HIGH 50: CPT | Performed by: INTERNAL MEDICINE

## 2025-01-16 PROCEDURE — 97162 PT EVAL MOD COMPLEX 30 MIN: CPT | Mod: GP

## 2025-01-16 PROCEDURE — 250N000009 HC RX 250: Performed by: INTERNAL MEDICINE

## 2025-01-16 PROCEDURE — 83735 ASSAY OF MAGNESIUM: CPT | Performed by: INTERNAL MEDICINE

## 2025-01-16 PROCEDURE — 97116 GAIT TRAINING THERAPY: CPT | Mod: GP

## 2025-01-16 PROCEDURE — 84100 ASSAY OF PHOSPHORUS: CPT | Performed by: INTERNAL MEDICINE

## 2025-01-16 RX ORDER — PANTOPRAZOLE SODIUM 40 MG/1
40 TABLET, DELAYED RELEASE ORAL
Status: DISCONTINUED | OUTPATIENT
Start: 2025-01-16 | End: 2025-01-18 | Stop reason: HOSPADM

## 2025-01-16 RX ADMIN — PANTOPRAZOLE SODIUM 40 MG: 40 TABLET, DELAYED RELEASE ORAL at 08:12

## 2025-01-16 RX ADMIN — ACETAMINOPHEN 975 MG: 325 TABLET, FILM COATED ORAL at 21:28

## 2025-01-16 RX ADMIN — PHYTONADIONE 5 MG: 10 INJECTION, EMULSION INTRAMUSCULAR; INTRAVENOUS; SUBCUTANEOUS at 08:12

## 2025-01-16 RX ADMIN — ATORVASTATIN CALCIUM 20 MG: 20 TABLET, FILM COATED ORAL at 20:22

## 2025-01-16 RX ADMIN — SPIRONOLACTONE 25 MG: 25 TABLET ORAL at 08:12

## 2025-01-16 RX ADMIN — LETROZOLE 2.5 MG: 2.5 TABLET ORAL at 08:12

## 2025-01-16 RX ADMIN — HEPARIN, PORCINE (PF) 10 UNIT/ML INTRAVENOUS SYRINGE 5 ML: at 08:11

## 2025-01-16 ASSESSMENT — ACTIVITIES OF DAILY LIVING (ADL)
ADLS_ACUITY_SCORE: 43
ADLS_ACUITY_SCORE: 49
ADLS_ACUITY_SCORE: 34
ADLS_ACUITY_SCORE: 53
ADLS_ACUITY_SCORE: 49
ADLS_ACUITY_SCORE: 34
ADLS_ACUITY_SCORE: 34
ADLS_ACUITY_SCORE: 53
ADLS_ACUITY_SCORE: 34
ADLS_ACUITY_SCORE: 49
ADLS_ACUITY_SCORE: 34
ADLS_ACUITY_SCORE: 49
ADLS_ACUITY_SCORE: 49
ADLS_ACUITY_SCORE: 53
ADLS_ACUITY_SCORE: 34
ADLS_ACUITY_SCORE: 43
ADLS_ACUITY_SCORE: 53
ADLS_ACUITY_SCORE: 49
DEPENDENT_IADLS:: TRANSPORTATION
ADLS_ACUITY_SCORE: 43
ADLS_ACUITY_SCORE: 43
ADLS_ACUITY_SCORE: 49
ADLS_ACUITY_SCORE: 53
ADLS_ACUITY_SCORE: 34

## 2025-01-16 NOTE — PLAN OF CARE
"Goal Outcome Evaluation:      Plan of Care Reviewed With: patient        Pt A&O, 1 assist with gait belt, walker for mobility, denied pain, ate 100% of her meal, has PORT on right upper chest intact, CMS intact, [place], later diet was changed to low fiber diet    /46 (BP Location: Right arm)   Pulse 63   Temp 98  F (36.7  C) (Oral)   Resp 18   Ht 1.638 m (5' 4.5\")   Wt 59.9 kg (132 lb 0.9 oz)   SpO2 94%   BMI 22.32 kg/m     Problem: Adult Inpatient Plan of Care  Goal: Plan of Care Review  Description: The Plan of Care Review/Shift note should be completed every shift.  The Outcome Evaluation is a brief statement about your assessment that the patient is improving, declining, or no change.  This information will be displayed automatically on your shift  note.  Outcome: Not Progressing  Flowsheets (Taken 1/16/2025 1118)  Plan of Care Reviewed With: patient  Goal: Patient-Specific Goal (Individualized)  Description: You can add care plan individualizations to a care plan. Examples of Individualization might be:  \"Parent requests to be called daily at 9am for status\", \"I have a hard time hearing out of my right ear\", or \"Do not touch me to wake me up as it startles  me\".  Outcome: Not Progressing  Goal: Absence of Hospital-Acquired Illness or Injury  Outcome: Not Progressing  Intervention: Identify and Manage Fall Risk  Recent Flowsheet Documentation  Taken 1/16/2025 1000 by Porsche Obrien RN  Safety Promotion/Fall Prevention: safety round/check completed  Intervention: Prevent Skin Injury  Recent Flowsheet Documentation  Taken 1/16/2025 1000 by Porsche Obrien RN  Body Position: supine, head elevated  Intervention: Prevent and Manage VTE (Venous Thromboembolism) Risk  Recent Flowsheet Documentation  Taken 1/16/2025 1000 by Porsche Obrien RN  VTE Prevention/Management: SCDs off (sequential compression devices)  Intervention: Prevent Infection  Recent Flowsheet Documentation  Taken 1/16/2025 1000 " by Porsche Obrien RN  Infection Prevention:   rest/sleep promoted   single patient room provided  Goal: Optimal Comfort and Wellbeing  Outcome: Not Progressing  Goal: Readiness for Transition of Care  Outcome: Not Progressing  Flowsheets (Taken 1/16/2025 1118)  Anticipated Changes Related to Illness: inability to care for self  Transportation Anticipated: family or friend will provide  Concerns to be Addressed:   coping/stress   decision making  Barriers to Discharge: labs resolts  Intervention: Mutually Develop Transition Plan  Recent Flowsheet Documentation  Taken 1/16/2025 1118 by Porsche Obrien RN  Anticipated Changes Related to Illness: inability to care for self  Transportation Anticipated: family or friend will provide  Concerns to be Addressed:   coping/stress   decision making     Problem: Gastrointestinal Bleeding  Goal: Optimal Coping with Acute Illness  Outcome: Not Progressing  Goal: Hemostasis  Outcome: Not Progressing

## 2025-01-16 NOTE — PLAN OF CARE
Goal Outcome Evaluation:      Plan of Care Reviewed With: patient    Overall Patient Progress: improvingOverall Patient Progress: improving    Outcome Evaluation: Patient anticipates returning to her apartment at [facility] when discharged.

## 2025-01-16 NOTE — PROGRESS NOTES
[facility]    Medicine Progress Note - Hospitalist Service    Date of Admission:  1/14/2025    Assessment & Plan     Kayley Putnam is a 89 year old female with history of MDS and breast cancer who was directed to the ED at [facility] due to concern for severe, recurrent anemia with ALEXEI noted on lab draw at [facility] in [place] (Dr. Vince Norton).      Acute on chronic Anemia     In the setting of known MDS  There was initial concern for UGI bleed- GI evaluated and recommended no scoping at this time but to optimize Coaguloapthy status   Melenic stool based on Dr. Agee' description--no stools since hospital admission    Received 2 units PRBCs in the ED with improvement of HGB from 5.2 to 7.6  Hgb has remained stable - 8.2 this am    No clear s/s of active GI bleeding  Will advance diet to low fiber today as has been tolerating full liquid without difficulty     2.  Pancytopenia related to MDS.   PLT of 21 on arrival   Received 1 unit PLT  Oncology recommending transfusion for PLT <10 or for active bleeding     Low WBC and low absolute ANC - oncology to provide recommendations     3.   Hyponatremia        Hypochloremia        Acute on top of chronic kidney injury    Sodium slightly lower this am - noted  Chloride low this am    Creat similar at 1.57   Last creat in chart review 1.45 (5/2024)    Will hold daily scheduled po lasix, for now, and encourage po intake - suspect may be getting slightly intravascularly dry  BMP in the am    4. Atrial fibrillation  Bradycardia noted on admission  PTA metoprolol has been held on admission - HR has improved and bradyarrythmia appears to have resolved     5. Acute respiratory difficulty/SOB  Symptoms have improved with PRBC's - although pt states that she has not been up out of bed for >24 hrs to trial her SOB symptoms with exertion    Now on RA - unclear if she had clear hypoxia documented or was given supplemental oxygen in the ED d/t bravo  Likely related  "to above  No CXR imaging      6. ? Acute on top of chronic renal insufficiency  Baseline creat is about 1.0.    Creat of 1.57 (1.57) (1.54)    7. VHD   CHF   Mod Aortic regurgitation and moderate to severe Mitral calcification with mild mitral stenosis, Mod TR and pulm HTN based on Echo from 2021  No signs of acute heart failure and today appears slightly more on the dry side  As above - will hold po lasix today   Reassess volume status in am 1/17/25    8.  Weakness and deconditioning    Pt states that she has not been up out of bed >24 hours (using puriwick)   PT consulted and is pending  Up to chair tid for meals and up to bathroom.         Barriers to discharge: weakness               PPE Used:  Mask, gloves          Diet: Advance Diet as Tolerated: Full Liquid Diet; Regular Diet Adult    DVT Prophylaxis: Pneumatic Compression Devices  Lee Catheter: Not present  Lines: PRESENT      Port a Cath 07/27/23 Single Lumen Left Chest wall-Site Assessment: WDL      Cardiac Monitoring: ACTIVE order. Indication: Bradycardias (48 hours)  Code Status: No CPR- Do NOT Intubate      Clinically Significant Risk Factors         # Hyponatremia: Lowest Na = 133 mmol/L in last 2 days, will monitor as appropriate        # Coagulation Defect: INR = 1.23 (Ref range: 0.85 - 1.15) and/or PTT = N/A, will monitor for bleeding  # Thrombocytopenia: Lowest platelets = 19 in last 2 days, will monitor for bleeding   # Hypertension: Noted on problem list                       Disposition Plan     Medically Ready for Discharge: Anticipated Tomorrow             Malena Pollard DO  Hospitalist Service  [facility]  Securely message with [place] (more info)  Text page via [facility] Paging/Directory   ______________________________________________________________________    Interval History     \"I am not feeling very well\".  Very weak and tired.  Has not really been up out of bed in the last day or so - has not walked the " halls.  Still SOB at times but thinks it is somewhat improved since PTA.  No HA, N/V, F/C.  Tolerating full liquid diet.  No bloody stools or melena observed since admission.    Not sure she can discharge from the hospital when she is so weak    Physical Exam   Vital Signs: Temp: 97.8  F (36.6  C) Temp src: Oral BP: 132/43 Pulse: 63   Resp: 18 SpO2: 94 % O2 Device: None (Room air) Oxygen Delivery: 1 LPM  Weight: 132 lbs .89 oz    GEN:  sleeping comfortably when I enter the room but easily arousable to my voice.  Pale, elderly female  Appears comfortable, no overt respiratory distress.  HEENT:  Normocephalic/atraumatic, no scleral icterus, no nasal discharge  CV:  somewhat distant heart sounds but regular rate and rhythm, S1+S2 asuc  LUNGS:  Clear to auscultation ant/lat bilaterally without rales/rhonchi/wheezing/retractions.  Symmetric chest rise on inhalation noted.  ABD:  Active bowel sounds, soft, non-tender/non-distended to light palpation throughout.  No clear rebound/guarding/rigidity.  EXT:  No significant pretibial edema bilaterally      Medical Decision Making       50 MINUTES SPENT BY ME on the date of service doing chart review, history, exam, documentation & further activities per the note.    This included time spent in room conferencing with daughter on the phone regarding plan of care, review of lab trends, GI notes and plan.    Data   Medications   Current Facility-Administered Medications   Medication Dose Route Frequency Provider Last Rate Last Admin     Current Facility-Administered Medications   Medication Dose Route Frequency Provider Last Rate Last Admin    atorvastatin (LIPITOR) tablet 20 mg  20 mg Oral At Bedtime Kaushal Resendez MD   20 mg at 01/15/25 2209    furosemide (LASIX) tablet 40 mg  40 mg Oral Daily Kaushal Resendez MD   40 mg at 01/15/25 0827    heparin lock flush 10 unit/mL injection 5-10 mL  5-10 mL Intracatheter Q24H Hilda Albert, DO   5 mL at 01/15/25 1406    heparin  lock flush 100 unit/mL injection 5-10 mL  5-10 mL Intracatheter Q28 Days Hilda Albert DO        letrozole (FEMARA) tablet 2.5 mg  2.5 mg Oral Daily Kaushal Resendez MD   2.5 mg at 01/15/25 0828    pantoprazole (PROTONIX) IV push injection 40 mg  40 mg Intravenous Q12H Kaushal Resendez MD   40 mg at 01/15/25 1949    phytonadione (MEPHYTON/VITAMIN K) 1 MG/ML oral solution 5 mg  5 mg Oral Daily Kaushal Resendez MD   5 mg at 01/15/25 0828    sodium chloride (PF) 0.9% PF flush 3 mL  3 mL Intracatheter Q8H Kaushal Resendez MD        spironolactone (ALDACTONE) tablet 25 mg  25 mg Oral Daily Kaushal Resendez MD   25 mg at 01/15/25 0828     Labs and Imaging results below reviewed today.  Recent Labs   Lab 01/16/25  0644 01/15/25  2215 01/15/25  1407 01/15/25  0800 01/14/25  1555   WBC 1.5*  --   --  1.4* 1.8*   HGB 8.2*  8.2* 7.7* 8.2* 7.6* 5.2*   HCT 24.9*  --   --  22.7* 16.6*   *  --   --  102* 115*   PLT 21*  --   --  19* 21*     Recent Labs   Lab 01/16/25  0810 01/16/25 0644 01/15/25  0800 01/14/25  1555   *  --  134* 133*   POTASSIUM 3.9 4.2 4.7 4.8   CHLORIDE 96*  --  99 101   CO2 21*  --  21* 18*   ANIONGAP 15  --  14 14   *  --  94 121*   BUN 63.4*  --  67.4* 75.2*   CR 1.57*  --  1.57* 1.54*   GFRESTIMATED 31*  --  31* 32*   NATALIA 9.3  --  9.1 9.3     7-Day Micro Results       No results found for the last 168 hours.          Recent Labs   Lab 01/16/25  0810 01/16/25  0644 01/15/25  0800 01/14/25  1555   *  --  134* 133*   POTASSIUM 3.9 4.2 4.7 4.8   CHLORIDE 96*  --  99 101   CO2 21*  --  21* 18*   ANIONGAP 15  --  14 14   *  --  94 121*   BUN 63.4*  --  67.4* 75.2*   CR 1.57*  --  1.57* 1.54*   GFRESTIMATED 31*  --  31* 32*   NATALIA 9.3  --  9.1 9.3   MAG  --  1.6* 1.7 1.7   PHOS  --  3.6 3.5 3.4   PROTTOTAL  --   --  6.0*  --    ALBUMIN  --   --  3.9  --    BILITOTAL  --   --  1.2  --    ALKPHOS  --   --  86  --    AST  --   --  10  --    ALT  --   --  9  --      Recent  "Labs   Lab 01/14/25  1555   INR 1.23*     No results for input(s): \"COLOR\", \"APPEARANCE\", \"URINEGLC\", \"URINEBILI\", \"URINEKETONE\", \"SG\", \"UBLD\", \"URINEPH\", \"PROTEIN\", \"UROBILINOGEN\", \"NITRITE\", \"LEUKEST\", \"RBCU\", \"WBCU\" in the last 168 hours.    No results found for this or any previous visit (from the past 24 hours).    "

## 2025-01-16 NOTE — PLAN OF CARE
"Orientation: AAOx4, VSS  Pain: pain is managed with PRN pain medication  O2: RA  GI/: Purewick in placed  Activity: A1 GB with walker. Stand by assist.  Diet: full liquid diet  Protocols: K, Mg, Phos  Major shift events:  Plan: continue with POC  Problem: Adult Inpatient Plan of Care  Goal: Plan of Care Review  Description: The Plan of Care Review/Shift note should be completed every shift.  The Outcome Evaluation is a brief statement about your assessment that the patient is improving, declining, or no change.  This information will be displayed automatically on your shift  note.  Outcome: Progressing  Flowsheets (Taken 1/16/2025 0329)  Plan of Care Reviewed With: patient  Overall Patient Progress: improving  Goal: Patient-Specific Goal (Individualized)  Description: You can add care plan individualizations to a care plan. Examples of Individualization might be:  \"Parent requests to be called daily at 9am for status\", \"I have a hard time hearing out of my right ear\", or \"Do not touch me to wake me up as it startles  me\".  Outcome: Progressing  Goal: Absence of Hospital-Acquired Illness or Injury  Outcome: Progressing  Intervention: Identify and Manage Fall Risk  Recent Flowsheet Documentation  Taken 1/15/2025 1947 by Carlos Hernandez RN  Safety Promotion/Fall Prevention: safety round/check completed  Intervention: Prevent Skin Injury  Recent Flowsheet Documentation  Taken 1/15/2025 1947 by Carlos Hernandez RN  Body Position: supine, head elevated  Intervention: Prevent and Manage VTE (Venous Thromboembolism) Risk  Recent Flowsheet Documentation  Taken 1/15/2025 1947 by Carlos Hernandez RN  VTE Prevention/Management: SCDs off (sequential compression devices)  Goal: Optimal Comfort and Wellbeing  Outcome: Progressing  Goal: Readiness for Transition of Care  Outcome: Progressing     Problem: Gastrointestinal Bleeding  Goal: Optimal Coping with Acute Illness  Outcome: Progressing  Goal: Hemostasis  Outcome: " Progressing   Goal Outcome Evaluation:      Plan of Care Reviewed With: patient    Overall Patient Progress: improvingOverall Patient Progress: improving

## 2025-01-16 NOTE — PROGRESS NOTES
01/16/25 1500   Appointment Info   Signing Clinician's Name / Credentials (PT) Magdalene Carroll, PT, DPT   Rehab Comments (PT) Monitor platelets   Living Environment   People in Home alone   Current Living Arrangements independent living facility   Home Accessibility no concerns   Living Environment Comments Patient lives alone in at [facility].  She lives on the third floor.  There is an elevator in the building.  Patient has a walk-in shower with grab bars.  She has a commode seat with armrests over her toilet.   Self-Care   Usual Activity Tolerance moderate   Current Activity Tolerance moderate   Regular Exercise No   Equipment Currently Used at Home wheelchair, power;walker, rolling   Fall history within last six months no   Activity/Exercise/Self-Care Comment Patient is typically independent for ADLs and ambulates in her apartment with a 4WW.  She has a power wheelchair which she uses to transfer to the dining room on the first floor.  Patient receives one meal per day.  She does her own laundry.   General Information   Onset of Illness/Injury or Date of Surgery 01/14/25   Referring Physician Malena Pollard, DO   Patient/Family Therapy Goals Statement (PT) Patient does not state specific goals.   Pertinent History of Current Problem (include personal factors and/or comorbidities that impact the POC) 89 year old female with history of MDS and breast cancer who was directed to the ED at [facility] due to concern for severe, recurrent anemia with ALEXEI noted on lab draw today at [facility] in [place] (Dr. Vince Norton).   Existing Precautions/Restrictions fall   Cognition   Affect/Mental Status (Cognition) WFL   Orientation Status (Cognition) oriented x 3   Follows Commands (Cognition) follows one-step commands;over 90% accuracy;delayed response/completion;increased processing time needed  ([place])   Pain Assessment   Patient Currently in Pain No   Integumentary/Edema   Integumentary/Edema Comments  Age-related skin changes   Posture    Posture Forward head position;Protracted shoulders   Range of Motion (ROM)   Range of Motion ROM is WFL   Strength (Manual Muscle Testing)   Strength (Manual Muscle Testing) Able to perform R SLR;Able to perform L SLR;Deficits observed during functional mobility   Strength Comments Patient notes a progressive declining in strength over the past 6 months.   Bed Mobility   Comment, (Bed Mobility) Mod I supine > sit with HOB elevated.   Transfers   Comment, (Transfers) SBA sit <> stand from bed.   Gait/Stairs (Locomotion)   Comment, (Gait/Stairs) SBA 10 ft ambulation with FWW, demonstrates forward flexed posture and downward gaze.   Balance   Balance Comments Impaired dynamic balance, ambulates with a 4WW in her apartment.   Sensory Examination   Sensory Perception Comments Reports baseline BLE neuropathy up to ankles.   Clinical Impression   Criteria for Skilled Therapeutic Intervention Yes, treatment indicated   PT Diagnosis (PT) Impaired functional mobility   Influenced by the following impairments Generalized weakness and deconditioning, impaired balance, exertional dyspnea, decreased activity tolerance   Functional limitations due to impairments Impaired independence with bed mobility, transfers, and gait   Clinical Presentation (PT Evaluation Complexity) evolving   Clinical Presentation Rationale Clinical judgement, PM, social support   Clinical Decision Making (Complexity) moderate complexity   Planned Therapy Interventions (PT) balance training;bed mobility training;gait training;home exercise program;neuromuscular re-education;patient/family education;postural re-education;strengthening;transfer training;progressive activity/exercise   Risk & Benefits of therapy have been explained evaluation/treatment results reviewed;care plan/treatment goals reviewed;risks/benefits reviewed;participants voiced agreement with care plan;participants included;patient   PT Total Evaluation  Time   PT Eval, Moderate Complexity Minutes (41984) 10   Physical Therapy Goals   PT Frequency Daily   PT Predicted Duration/Target Date for Goal Attainment 01/20/25   PT Goals Bed Mobility;Transfers;Gait   PT: Bed Mobility Independent;Supine to/from sit;Rolling   PT: Transfers Modified independent;Sit to/from stand;Bed to/from chair;Assistive device   PT: Gait Modified independent;100 feet;Rolling walker   Interventions   Interventions Quick Adds Gait Training;Therapeutic Activity   Therapeutic Activity   Therapeutic Activities: dynamic activities to improve functional performance Minutes (55241) 8   Symptoms Noted During/After Treatment Fatigue   Treatment Detail/Skilled Intervention Patient asleep in bed on arrival, wakes and is agreeable to PT session.  Retrieved 4WW for session as this is device patient uses at baseline. Patient IND for sitting balance.  Assisted to don brief, patient able to pull up with SBA for standing balance.  Patient requires x1 seated rest on 4WW while ambulating in loco secondary to fatigue and shortness of breath.  Appropriately applies brakes on 4WW and CGA to turn to sit on chair.  Patient requesting to use bathroom upon return to room.  CGA for stand > sit due to low toilet height, UE support on grab bar.  IND for pericares.  MinAx1 needed for sit > stand from low toilet.  Patient returning to bed at end of session, IND.  NST arriving to record vitals, call light in reach.   Gait Training   Gait Training Minutes (81194) 14   Symptoms Noted During/After Treatment (Gait Training) fatigue;shortness of breath   Treatment Detail/Skilled Intervention Patient ambulates an additional 70' + 80' with 4WW and SBA.  Patient initially with 4WW too close to body and hitting knees on walker seat during swing phase of gait.  Instructed for upright posture and pursed lip breathing.  Requires ~5 minute seated rest between bouts of balance secondary to exertional dyspnea and LE fatigue. Encouraged  walking program to progress activity tolerance.   Distance in Feet 2 x 80' (10' as part of eval)   PT Discharge Planning   PT Plan Bring 4WW to use for transfers and ambulation   PT Discharge Recommendation (DC Rec) home with assist;home with home care physical therapy   PT Rationale for DC Rec Patient presents slightly below her prior level of function.  Anticipate she will progress to mod I for household mobility with use of 4WW.  She uses a power wheelchair for longer distances, such as to the dining room.  Patient would likely benefit from HHPT to promote safety and independence with mobility at [facility].   PT Brief overview of current status Ax1 walker   PT Total Distance Amb During Session (feet) 160   Physical Therapy Time and Intention   Timed Code Treatment Minutes 22   Total Session Time (sum of timed and untimed services) 32

## 2025-01-16 NOTE — CONSULTS
Care Management Initial Consult    General Information  Assessment completed with: Patient,    Type of CM/SW Visit: Initial Assessment    Primary Care Provider verified and updated as needed: Yes   Readmission within the last 30 days: no previous admission in last 30 days      Reason for Consult: other (see comments) (elevated URR)  Advance Care Planning:            Communication Assessment  Patient's communication style: spoken language (English or Bilingual)    Hearing Difficulty or Deaf: yes   Wear Glasses or Blind: yes    Cognitive  Cognitive/Neuro/Behavioral: WDL                      Living Environment:   People in home: alone     Current living Arrangements: apartment, independent living facility      Able to return to prior arrangements: yes       Family/Social Support:  Care provided by: self  Provides care for: no one     Support system: Children          Description of Support System: Supportive         Current Resources:   Patient receiving home care services: No        Community Resources: None  Equipment currently used at home: walker, rolling, walker, standard, raised toilet seat  Supplies currently used at home:      Employment/Financial:  Employment Status:          Financial Concerns:             Does the patient's insurance plan have a 3 day qualifying hospital stay waiver?  No    Lifestyle & Psychosocial Needs:  Social Drivers of Health     Food Insecurity: Low Risk  (1/15/2025)    Food Insecurity     Within the past 12 months, did you worry that your food would run out before you got money to buy more?: No     Within the past 12 months, did the food you bought just not last and you didn t have money to get more?: No   Depression: Not at risk (5/29/2024)    Received from [employer]    PHQ-2     PHQ-2 Score: 2   Housing Stability: Low Risk  (1/15/2025)    Housing Stability     Do you have housing? : Yes     Are you worried about losing your housing?: No   Tobacco Use: Low Risk  (11/22/2024)     Patient History     Smoking Tobacco Use: Never     Smokeless Tobacco Use: Never     Passive Exposure: Not on file   Financial Resource Strain: Low Risk  (1/15/2025)    Financial Resource Strain     Within the past 12 months, have you or your family members you live with been unable to get utilities (heat, electricity) when it was really needed?: No   Alcohol Use: Not on file   Transportation Needs: Low Risk  (1/15/2025)    Transportation Needs     Within the past 12 months, has lack of transportation kept you from medical appointments, getting your medicines, non-medical meetings or appointments, work, or from getting things that you need?: No   Physical Activity: Not on file   Interpersonal Safety: Low Risk  (1/15/2025)    Interpersonal Safety     Do you feel physically and emotionally safe where you currently live?: Yes     Within the past 12 months, have you been hit, slapped, kicked or otherwise physically hurt by someone?: No     Within the past 12 months, have you been humiliated or emotionally abused in other ways by your partner or ex-partner?: No   Stress: Not on file   Social Connections: Unknown (4/30/2023)    [facility]    Social Connections     Frequency of Communication with Friends and Family: Not on file   Health Literacy: Not on file       Functional Status:  Prior to admission patient needed assistance:   Dependent ADLs:: Ambulation-walker  Dependent IADLs:: Transportation       Mental Health Status:          Chemical Dependency Status:                Values/Beliefs:  Spiritual, Cultural Beliefs, Christian Practices, Values that affect care:                 Discussed  Partnership in Safe Discharge Planning  document with patient/family: No    Additional Information:  DOMINIQUE met with patient due to a consult placed for an elevated unplanned readmission risk score of 22%. She resides in an apartment alone at  [facility]. Patient uses a walker for mobility. She uses the facility bus or family for transportation. Patient anticipates returning to [facility] when discharged. She voiced she will need a wheelchair ride arranged & is aware from her previous ride that it is private pay. SW reviewed estimated costs for a wheelchair ride & patient voiced agreement. She denied any additional questions or concerns related to discharge planning.     Next Steps: SW will continue to follow to assist prn with discharge planning.     LUIGI Delcid

## 2025-01-16 NOTE — CONSULTS
"CLINICAL NUTRITION SERVICES - ASSESSMENT NOTE    Malnutrition Status:    % Intake: < 75% for >/= 1 month (moderate)  % Weight Loss: Weight loss does not meet criteria   Subcutaneous Fat Loss: Triceps: Moderate --> not using as indicator with 1 region present  Muscle Loss: Wasting of the temples (temporalis muscle): Mild, Clavicles (pectoralis and deltoids): Moderate, and Shoulders (deltoids): Moderate --> not clear what is baseline sarcopenia/age-related losses vs any acute changes  Fluid Accumulation/Edema: None documented    Malnutrition Diagnosis: Unable to determine due to insufficient information  Malnutrition Present on Admission: Unclear     Registered Dietitian Interventions:  - Diet per provider teams.    - Encouraged Kayley to add in a protein containing snack vs oral supplement throughout the day.  Sent her a 1x trial of Ensure as she did not want scheduled supplements at this time.         REASON FOR ASSESSMENT  Provider order - \"malnutrition? Poor po intake\"  Malnutrition screening tool (MST)     PMH of: MDS, breast CA, CHF.    Admit 2/2: Anemia, ALEXEI.    SUBJECTIVE INFORMATION  Information obtained from Kayley who was in room + chart.    NUTRITION HISTORY  - Diet at home: Regular.  Receives 1 meal/day from her living facility.  - Usual intakes: Meals TID  Breakfast: English muffin and orange juice.  Lunch: Cup of milk.  Dinner: Dining room, likes fruit + soup.  HS: Another cup of milk.  - Barriers to PO intakes: Describes chronically low appetite for period of years, feels this has worsened recently (weeks to months).  - Use of oral supplements: None PTA.  Kayley states her daughter has been wanting her to take supplements and she has tried them in the past, but hasn't had much success.  She feels like her daughter thinks she does not eat enough.  - Allergies: NKFA.    CURRENT NUTRITION ORDERS AND INTAKE/TOLERANCE  Diet: Fulls, ADAT to regular    Limited timeframe of admission, diet advanced from clears to " "fulls yesterday evening.      Kayley would be open to an oral supplement while admitted.      CONSULTS  GI following  Oncology following    LABS  Nutrition-relevant labs: Reviewed.   - Noted Na low at 132    MEDICATIONS  Nutrition-relevant medications: Reviewed.     SYSTEM FINDINGS    - Skin: No current documentation of PI.   - GI symptoms: No recorded BM acutely.     ANTHROPOMETRICS  Height: 163.8 cm (5' 4.5\")  Most Recent Weight: 59.9 kg (132 lb 0.9 oz)  Body mass index is 22.32 kg/m .:   Normal BMI  Wt Readings from Last 10 Encounters:   01/15/25 59.9 kg (132 lb 0.9 oz)   11/22/24 61.2 kg (135 lb)   10/29/24 61.8 kg (136 lb 3.9 oz)   05/24/22 66.7 kg (147 lb)   03/20/20 71.7 kg (158 lb)   03/04/20 71.7 kg (158 lb)   02/26/20 76.2 kg (168 lb)   02/24/20 76.7 kg (169 lb 1.6 oz)   02/21/20 74.5 kg (164 lb 3.2 oz)   02/04/20 76.2 kg (168 lb)     - No current documentation of edema.   - Kayley states she used to weigh ?190# \"a year and a half ago\".  This is not seen in wt trending above and question accuracy.  Did clarify wt of 190 and she confirmed.    - Above wt trending shows 3% wt loss in the past >2 months.    ASSESSED NUTRITION NEEDS  Dosing Weight: 60 kg, based on admit wt/bed scale  Estimated Energy Needs: >/=25 kcal/kg  Justification: Minimum maintenance  Estimated Protein Needs: >/=1.2 grams of pro/kg  Justification: preservation of lean body mass, advanced age  Estimated Fluid Needs: per provider    NUTRITION DIAGNOSIS  Inadequate oral intake related to suspect chronically decreased oral intake as evidenced by diet recall suggesting <75% of nutrition needs met daily.    INTERVENTIONS  Collaboration by nutrition professional with other providers: Checked-in w/ RN on unit.     Medical food supplement therapy     Goals  Diet advancement to solids w/in 48 hrs.   PO intakes of at least 75% meals, snacks, or supplements TID.      Monitoring/Evaluation  Progress toward goals will be monitored and evaluated per " "policy.      Julieta Hamilton RDN, , LD  Clinical Dietitian  [facility] Message Group: \"Dietitian [Milena]\"  Office: [phone]  Pagers:  3rd floor/ICU: [phone]  All other floors: [phone]  Weekend/holiday: [phone]    "

## 2025-01-17 ENCOUNTER — APPOINTMENT (OUTPATIENT)
Dept: PHYSICAL THERAPY | Facility: CLINIC | Age: OVER 89
DRG: 808 | End: 2025-01-17
Payer: MEDICARE

## 2025-01-17 ENCOUNTER — APPOINTMENT (OUTPATIENT)
Dept: OCCUPATIONAL THERAPY | Facility: CLINIC | Age: OVER 89
DRG: 808 | End: 2025-01-17
Attending: INTERNAL MEDICINE
Payer: MEDICARE

## 2025-01-17 ENCOUNTER — APPOINTMENT (OUTPATIENT)
Dept: GENERAL RADIOLOGY | Facility: CLINIC | Age: OVER 89
DRG: 808 | End: 2025-01-17
Attending: STUDENT IN AN ORGANIZED HEALTH CARE EDUCATION/TRAINING PROGRAM
Payer: MEDICARE

## 2025-01-17 ENCOUNTER — APPOINTMENT (OUTPATIENT)
Dept: CARDIOLOGY | Facility: CLINIC | Age: OVER 89
DRG: 808 | End: 2025-01-17
Attending: STUDENT IN AN ORGANIZED HEALTH CARE EDUCATION/TRAINING PROGRAM
Payer: MEDICARE

## 2025-01-17 LAB
ANION GAP SERPL CALCULATED.3IONS-SCNC: 12 MMOL/L (ref 7–15)
BUN SERPL-MCNC: 60 MG/DL (ref 8–23)
CALCIUM SERPL-MCNC: 9.1 MG/DL (ref 8.8–10.4)
CHLORIDE SERPL-SCNC: 99 MMOL/L (ref 98–107)
CREAT SERPL-MCNC: 1.6 MG/DL (ref 0.51–0.95)
EGFRCR SERPLBLD CKD-EPI 2021: 30 ML/MIN/1.73M2
GLUCOSE SERPL-MCNC: 100 MG/DL (ref 70–99)
HCO3 SERPL-SCNC: 21 MMOL/L (ref 22–29)
HGB BLD-MCNC: 7.9 G/DL (ref 11.7–15.7)
LVEF ECHO: NORMAL
MAGNESIUM SERPL-MCNC: 1.6 MG/DL (ref 1.7–2.3)
PHOSPHATE SERPL-MCNC: 3.2 MG/DL (ref 2.5–4.5)
POTASSIUM SERPL-SCNC: 4.1 MMOL/L (ref 3.4–5.3)
SODIUM SERPL-SCNC: 132 MMOL/L (ref 135–145)

## 2025-01-17 PROCEDURE — 71045 X-RAY EXAM CHEST 1 VIEW: CPT

## 2025-01-17 PROCEDURE — 80048 BASIC METABOLIC PNL TOTAL CA: CPT | Performed by: INTERNAL MEDICINE

## 2025-01-17 PROCEDURE — 250N000013 HC RX MED GY IP 250 OP 250 PS 637: Performed by: INTERNAL MEDICINE

## 2025-01-17 PROCEDURE — 84132 ASSAY OF SERUM POTASSIUM: CPT | Performed by: INTERNAL MEDICINE

## 2025-01-17 PROCEDURE — 97535 SELF CARE MNGMENT TRAINING: CPT | Mod: GO

## 2025-01-17 PROCEDURE — 97110 THERAPEUTIC EXERCISES: CPT | Mod: GP | Performed by: PHYSICAL THERAPIST

## 2025-01-17 PROCEDURE — 93306 TTE W/DOPPLER COMPLETE: CPT | Mod: 26 | Performed by: INTERNAL MEDICINE

## 2025-01-17 PROCEDURE — 84100 ASSAY OF PHOSPHORUS: CPT | Performed by: INTERNAL MEDICINE

## 2025-01-17 PROCEDURE — 85018 HEMOGLOBIN: CPT | Performed by: INTERNAL MEDICINE

## 2025-01-17 PROCEDURE — 250N000011 HC RX IP 250 OP 636: Performed by: INTERNAL MEDICINE

## 2025-01-17 PROCEDURE — 97165 OT EVAL LOW COMPLEX 30 MIN: CPT | Mod: GO

## 2025-01-17 PROCEDURE — 120N000001 HC R&B MED SURG/OB

## 2025-01-17 PROCEDURE — 99232 SBSQ HOSP IP/OBS MODERATE 35: CPT | Performed by: STUDENT IN AN ORGANIZED HEALTH CARE EDUCATION/TRAINING PROGRAM

## 2025-01-17 PROCEDURE — 83735 ASSAY OF MAGNESIUM: CPT | Performed by: INTERNAL MEDICINE

## 2025-01-17 PROCEDURE — 93306 TTE W/DOPPLER COMPLETE: CPT

## 2025-01-17 RX ADMIN — PANTOPRAZOLE SODIUM 40 MG: 40 TABLET, DELAYED RELEASE ORAL at 08:08

## 2025-01-17 RX ADMIN — HEPARIN, PORCINE (PF) 10 UNIT/ML INTRAVENOUS SYRINGE 5 ML: at 14:06

## 2025-01-17 RX ADMIN — ATORVASTATIN CALCIUM 20 MG: 20 TABLET, FILM COATED ORAL at 20:06

## 2025-01-17 RX ADMIN — LETROZOLE 2.5 MG: 2.5 TABLET ORAL at 08:08

## 2025-01-17 ASSESSMENT — ACTIVITIES OF DAILY LIVING (ADL)
ADLS_ACUITY_SCORE: 52
ADLS_ACUITY_SCORE: 53
ADLS_ACUITY_SCORE: 52
ADLS_ACUITY_SCORE: 53
ADLS_ACUITY_SCORE: 52
ADLS_ACUITY_SCORE: 53
ADLS_ACUITY_SCORE: 52
ADLS_ACUITY_SCORE: 52
ADLS_ACUITY_SCORE: 53
ADLS_ACUITY_SCORE: 52
ADLS_ACUITY_SCORE: 53
ADLS_ACUITY_SCORE: 52
ADLS_ACUITY_SCORE: 53
ADLS_ACUITY_SCORE: 52
ADLS_ACUITY_SCORE: 52
ADLS_ACUITY_SCORE: 53
ADLS_ACUITY_SCORE: 52
ADLS_ACUITY_SCORE: 53

## 2025-01-17 NOTE — PLAN OF CARE
"  Problem: Adult Inpatient Plan of Care  Goal: Plan of Care Review  Description: The Plan of Care Review/Shift note should be completed every shift.  The Outcome Evaluation is a brief statement about your assessment that the patient is improving, declining, or no change.  This information will be displayed automatically on your shift  note.  Outcome: Progressing  Flowsheets (Taken 1/17/2025 0507)  Outcome Evaluation: PRN tylenol given for shoulder pain, A-fib CVR on telemetry monitoring.  Plan of Care Reviewed With: patient  Overall Patient Progress: improving  Goal: Patient-Specific Goal (Individualized)  Description: You can add care plan individualizations to a care plan. Examples of Individualization might be:  \"Parent requests to be called daily at 9am for status\", \"I have a hard time hearing out of my right ear\", or \"Do not touch me to wake me up as it startles  me\".  Outcome: Progressing  Goal: Absence of Hospital-Acquired Illness or Injury  Outcome: Progressing  Intervention: Identify and Manage Fall Risk  Recent Flowsheet Documentation  Taken 1/16/2025 2042 by Melba Chacko RN  Safety Promotion/Fall Prevention: safety round/check completed  Goal: Optimal Comfort and Wellbeing  Outcome: Progressing  Goal: Readiness for Transition of Care  Outcome: Progressing     Problem: Gastrointestinal Bleeding  Goal: Optimal Coping with Acute Illness  Outcome: Progressing  Goal: Hemostasis  Outcome: Progressing   Goal Outcome Evaluation:      Plan of Care Reviewed With: patient    Overall Patient Progress: improvingOverall Patient Progress: improving    Outcome Evaluation: Denied pain, SR on telemetry monitoring.      "

## 2025-01-17 NOTE — PROGRESS NOTES
01/17/25 0800   Appointment Info   Signing Clinician's Name / Credentials (OT) Carlene Felisha, OTD, OTR/L   Living Environment   People in Home alone   Current Living Arrangements independent living facility   Home Accessibility no concerns   Living Environment Comments Patient lives alone in at [facility]. She lives on the third floor. There is an elevator in the building. Patient has a walk-in shower with grab bars. She has a commode seat with armrests over her toilet.   Self-Care   Usual Activity Tolerance moderate   Current Activity Tolerance moderate   Regular Exercise No   Equipment Currently Used at Home shower chair;grab bar, tub/shower;grab bar, toilet;wheelchair, power;walker, rolling;raised toilet seat   Fall history within last six months no   Activity/Exercise/Self-Care Comment Patient is typically independent for ADLs and ambulates in her apartment with a 4WW.  She has a power wheelchair which she uses to transfer to the dining room on the first floor.  Does own laundry, med management, bathing.   Instrumental Activities of Daily Living (IADL)   IADL Comments Pt gets assist for cleaning, and has dining room for meals from facility, and transportation.   General Information   Onset of Illness/Injury or Date of Surgery 01/14/25   Referring Physician Malena Pollard, DO   Patient/Family Therapy Goal Statement (OT) Return home   Additional Occupational Profile Info/Pertinent History of Current Problem Kayley Putnam is a 89 year old female with history of MDS and breast cancer who was directed to the ED at [facility] due to concern for severe, recurrent anemia with ALEXEI noted on lab draw at [facility] in [place] (Dr. Vince Norton).   Existing Precautions/Restrictions fall;oxygen therapy device and L/min   General Observations and Info Pt agreeable to therapy, fatigued following activity.   Cognitive Status Examination   Orientation Status orientation to person, place and time   Visual  Perception   Visual Impairment/Limitations WFL   Sensory   Sensory Quick Adds sensation intact   Pain Assessment   Patient Currently in Pain No   Posture   Posture forward head position;kyphosis   Range of Motion Comprehensive   Comment, General Range of Motion BUE WFL   Strength Comprehensive (MMT)   Comment, General Manual Muscle Testing (MMT) Assessment BUE WFL, general weakness and decreased activity tolerance   Bed Mobility   Comment (Bed Mobility) SBA   Transfers   Transfer Comments SBA and FWW   Balance   Balance Comments Overall steady with FWW, no overt LOB   Activities of Daily Living   BADL Assessment/Intervention bathing;lower body dressing;grooming;toileting   Bathing Assessment/Intervention   Comment, (Bathing) CGA   Lower Body Dressing Assessment/Training   Comment, (Lower Body Dressing) Ind   Grooming Assessment/Training   Comment, (Grooming) ind   Toileting   Comment, (Toileting) CGA   Clinical Impression   Criteria for Skilled Therapeutic Interventions Met (OT) Yes, treatment indicated   OT Diagnosis Impaired ADLS   Influenced by the following impairments Bradycardia, weakness   OT Problem List-Impairments impacting ADL problems related to;activity tolerance impaired;mobility;strength   Assessment of Occupational Performance 1-3 Performance Deficits   Identified Performance Deficits bathing, activity tolerance for ADLS   Planned Therapy Interventions (OT) ADL retraining;strengthening;transfer training;progressive activity/exercise   Clinical Decision Making Complexity (OT) problem focused assessment/low complexity   Risk & Benefits of therapy have been explained evaluation/treatment results reviewed;care plan/treatment goals reviewed;risks/benefits reviewed;current/potential barriers reviewed;participants voiced agreement with care plan;participants included;patient   OT Total Evaluation Time   OT Eval, Low Complexity Minutes (25113) 5   OT Goals   Therapy Frequency (OT) One time eval and treatment    OT Predicted Duration/Target Date for Goal Attainment 01/17/25   OT Goals Hygiene/Grooming;Lower Body Dressing;Lower Body Bathing;Toilet Transfer/Toileting   OT: Hygiene/Grooming modified independent;Goal Met   OT: Lower Body Dressing Modified independent;Goal Met   OT: Lower Body Bathing Modified independent;Goal Met   OT: Toilet Transfer/Toileting Modified independent;Goal Met   Interventions   Interventions Quick Adds Self-Care/Home Management   Self-Care/Home Management   Self-Care/Home Mgmt/ADL, Compensatory, Meal Prep Minutes (81149) 16   Symptoms Noted During/After Treatment (Meal Preparation/Planning Training) fatigue;increase work of breath;significant change in vital signs   Treatment Detail/Skilled Intervention Pt greeted supine in bed and agreeable to therapy session, just completed ambulation in hallway so fatigued. On 2L O2 NC, no O2 at baseline. Pt able to complete all txs with SBA. While EOB pt completed LB dressing to doff/don socks with ind level and figure 4 technique also demonstrates good functional reaching for FB dressing. Pt then STS with sBA and FWW and ambualted short functioal distance within room and into bathroom SBA and FWW. Able to demo ability to complete short stadning sink side g/h cares with ind level, shower tx and toilet tx all with CGA progressing to ind level. Pt has AE/DME at home to icnrease independence, currently has no concerns for ADLs at home. Returneed to seated on EOB, SpO2 spot checked with SpO2 desatted to 78%, encouraged PLB technique and O2 placed back on 2L with SpO2 returning to >90% after ~1 min. Throughout session pt edu on activity tolerance and ECWS strategies at home, with possible O2 needs. Pt with all questiosn and concerns answered. Left supine in bed with all immediate needs met and call light within reach, bed alarm on.   OT Discharge Planning   OT Plan D/C   OT Discharge Recommendation (DC Rec) home with assist   OT Rationale for DC Rec Pt below  functional baseline mostly in activity tolerance for ADLs. Does have assist from facility for IADLs. Anticiapte once medically ready pt able to return to ILF with possible O2 needs. Will defer to PT for activity tolerance while IP. Otherwise all OT IP goals met.   OT Brief overview of current status Ind for ADLs, desatted with no O2 during activity   OT Total Distance Amb During Session (feet) 45   Total Session Time   Timed Code Treatment Minutes 16   Total Session Time (sum of timed and untimed services) 21

## 2025-01-17 NOTE — PROGRESS NOTES
[facility] Follow Up Note:    Assessment and Plan:  Kayley Putnam is a 89 year old female patient admitted with Anemia.     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 (1% blast present and stable)  -Receiving Luspatercept every 3 weeks  -Most recently possible increase in blasts, peripheral smear performed as outpatient, shows minimal increase in blasts but lower counts.     PLAN:  -Continue transfusion support  -Luspatercept  -No plans for chemotherapy or aggressive cares     2. GI bleed  -Dark stool was reported in the ER, stool occult positive.  Currently no active bleeding  -GI has been consulted and would recommend against an endoscopy     PLAN:  - GI keeping age and goals in mind would not recommend endoscopy at this point  -Continue Protonix     3.Pancytopenia related to MDS     PLAN:  -ANC is low, no evidence of neutropenic symptoms, antibiotics if neutropenic fever  -PRBC if hemoglobin less than 7  -PLT transfusion if less than 10 or actively bleeding     4. DNR/ DNI     Discharge home or back to living facility tomorrow if counts stable.  Discussed with primary team    Subjective:    Alert oriented, has a hard time hearing, has not had a bowel movement since she has been here.      Labs:  All labs reviewed    CBC  Recent Labs   Lab 01/17/25  0611 01/16/25  0644 01/15/25  2215 01/15/25  1407 01/15/25  0800 01/14/25  1555   WBC  --  1.5*  --   --  1.4* 1.8*   HGB 7.9* 8.2*  8.2* 7.7*   < > 7.6* 5.2*   MCV  --  104*  --   --  102* 115*   PLT  --  21*  --   --  19* 21*    < > = values in this interval not displayed.       CMP  Recent Labs   Lab 01/17/25  0611 01/16/25  0810 01/16/25  0644 01/15/25  0800 01/14/25  1555   * 132*  --  134* 133*   POTASSIUM 4.1 3.9 4.2 4.7 4.8   CHLORIDE 99 96*  --  99 101   CO2 21* 21*  --  21* 18*   ANIONGAP 12 15  --  14 14   * 138*  --  94 121*   BUN 60.0*  "63.4*  --  67.4* 75.2*   CR 1.60* 1.57*  --  1.57* 1.54*   GFRESTIMATED 30* 31*  --  31* 32*   NATALIA 9.1 9.3  --  9.1 9.3   MAG 1.6*  --  1.6* 1.7 1.7   PHOS 3.2  --  3.6 3.5 3.4   PROTTOTAL  --   --   --  6.0*  --    ALBUMIN  --   --   --  3.9  --    BILITOTAL  --   --   --  1.2  --    ALKPHOS  --   --   --  86  --    AST  --   --   --  10  --    ALT  --   --   --  9  --        INR  Recent Labs   Lab 01/14/25  1555   INR 1.23*       Blood CultureNo results for input(s): \"CULT\" in the last 168 hours.      Vince Norton MD  [facility]  1/17/2025 4:56 PM       "

## 2025-01-17 NOTE — PLAN OF CARE
"Goal Outcome Evaluation:      Plan of Care Reviewed With: patient    Overall Patient Progress: improvingOverall Patient Progress: improving    Outcome Evaluation: Denies pain. FERRERA, desat to 82% on RA during ambulation. Placed on 2L O2. Provider ordered echo. Hgb 7.9. Tolerating low fiber diet. Port HL. CHG completed. Tele - AFib CVR. GI and hem/onc. Plan for discharge home tomorrow with wheelchair transport [phone].    /50 (BP Location: Left arm)   Pulse 62   Temp 98  F (36.7  C) (Oral)   Resp 24   Ht 1.638 m (5' 4.5\")   Wt 59.9 kg (132 lb 0.9 oz)   SpO2 95%   BMI 22.32 kg/m       Problem: Adult Inpatient Plan of Care  Goal: Plan of Care Review  Description: The Plan of Care Review/Shift note should be completed every shift.  The Outcome Evaluation is a brief statement about your assessment that the patient is improving, declining, or no change.  This information will be displayed automatically on your shift  note.  Outcome: Progressing  Flowsheets (Taken 1/17/2025 1422)  Outcome Evaluation: Denies pain. FERRERA, desat to 82% on RA during ambulation. Placed on 2L O2. Provider ordered echo. Hgb 7.9. Tolerating low fiber diet. Port HL. CHG completed. Tele - AFib CVR. GI and hem/onc. Plan for discharge home tomorrow with wheelchair transport [phone].  Plan of Care Reviewed With: patient  Overall Patient Progress: improving  Goal: Patient-Specific Goal (Individualized)  Description: You can add care plan individualizations to a care plan. Examples of Individualization might be:  \"Parent requests to be called daily at 9am for status\", \"I have a hard time hearing out of my right ear\", or \"Do not touch me to wake me up as it startles  me\".  Outcome: Progressing  Goal: Absence of Hospital-Acquired Illness or Injury  Outcome: Progressing  Intervention: Identify and Manage Fall Risk  Recent Flowsheet Documentation  Taken 1/17/2025 0816 by Ericka Roberts RN  Safety Promotion/Fall Prevention: safety round/check " completed  Intervention: Prevent Skin Injury  Recent Flowsheet Documentation  Taken 1/17/2025 0807 by Ericka Roberts RN  Body Position: position changed independently  Intervention: Prevent and Manage VTE (Venous Thromboembolism) Risk  Recent Flowsheet Documentation  Taken 1/17/2025 0816 by Ericka Roberts RN  VTE Prevention/Management: SCDs off (sequential compression devices)  Goal: Optimal Comfort and Wellbeing  Outcome: Progressing  Goal: Readiness for Transition of Care  Outcome: Progressing

## 2025-01-17 NOTE — PROGRESS NOTES
Care Management Follow Up    Length of Stay (days): 3    Expected Discharge Date: 01/18/2025     Concerns to be Addressed: coping/stress, decision making     Patient plan of care discussed at interdisciplinary rounds: Yes    Anticipated Discharge Disposition: Home              Anticipated Discharge Services:    Anticipated Discharge DME:      Patient/family educated on Medicare website which has current facility and service quality ratings:    Education Provided on the Discharge Plan:    Patient/Family in Agreement with the Plan:      Referrals Placed by CM/SW:    Private pay costs discussed: transportation costs    Discussed  Partnership in Safe Discharge Planning  document with patient/family: No     Handoff Completed: No, handoff not indicated or clinically appropriate    Additional Information:  Patient is expected to discharge tomorrow. CM met with patient and she will need  w/c transport home. CM set up transport for Sat, 1/18, [phone]. Patient, RN and NST informed.    Next Steps: Monitor for medical readiness tomorrow.    Wendy Smith, RN, BSN, CM  Inpatient Care Coordination  [facility]  [phone]

## 2025-01-17 NOTE — PLAN OF CARE
Occupational Therapy Discharge Summary    Reason for therapy discharge:    All goals and outcomes met, no further needs identified.    Progress towards therapy goal(s). See goals on Care Plan in [facility] electronic health record for goal details.  Goals met    Therapy recommendation(s):    No further therapy is recommended. Pt below functional baseline mostly in activity tolerance for ADLs. Does have assist from facility for IADLs. Anticiapte once medically ready pt able to return to ILF with possible O2 needs. Will defer to PT for activity tolerance while IP. Otherwise all OT IP goals met.

## 2025-01-17 NOTE — PROGRESS NOTES
[facility]    Medicine Progress Note - Hospitalist Service  Date of Admission: 1/14/2025     Assessment & Plan         Kayley Putnam is a 89 year old female with past medical history significant for MDS and breast cancer who presented to [facility] on 1/14/2025 with severe, recurrent anemia and acute kidney injury.    Acute on Chronic Anemia  Initially presented with possible concern for GI bleeding in setting of melanotic stools. Discussed with GI earlier in admission, who recommended improving patient's coagulopathy status first prior to considering endoscopic evaluation. Now without recurrent evidence of GI bleeding. Hgb initially 5.2--> 7.6 after 2U PRBC. Now tolerating low fiber diet. Continuing to monitor Hgb.    Pancytopenia  Myelodysplastic Syndrome  Pancytopenia in setting of MDS. Received 2U PRBC + 1U PLT on admission. Hematology consulted and recommending transfusion for Hgb <7 or PLT <10, or in evidence of active bleeding.    Acute on Chronic Kidney Disease  Creatinine stable at 1.5; most recent previous lab 1.4 from 05/2024. Monitoring.    Acute Hypoxic Respiratory Failure  Initially placed on 2L on admission, now weaned to room air. Morning of 1/17/2025 noted to have desaturations during ambulation requiring restarting supplemental O2. Repeat TTE obtained in setting of clear lungs but very loud murmur, showing severe MR which I wonder if it is contributing. Will also obtain CXR.    Severe Mitral Regurgitation  Possible Torn Chordae  Moderate Aortic Regurgitation  Pulmonary Hypertension  TTE showing moderate mitral calcification with mobile echodensity consistent with possible torn chordae and severe mitral regurgitation. Also with moderate to mode-severe tricuspid regurgitation and moderate aortic stenosis. Obviously consulting cardiology for further guidance, though given her age and general baseline health I suspect she would not be a cardiothoracic surgery  "candidate.    Atrial Fibrillation: Previously on metoprolol but with bradycardia on admission. Holding metoprolol for now.  Hyponatremia / Hypochloridemia: Replace PRN.  Weakness: PT recommending TCU. Could be related to baseline health vs anemia vs valvular heart disease.         Clinically Significant Risk Factors         # Hyponatremia: Lowest Na = 132 mmol/L in last 2 days, will monitor as appropriate  # Hypochloremia: Lowest Cl = 96 mmol/L in last 2 days, will monitor as appropriate    # Hypomagnesemia: Lowest Mg = 1.6 mg/dL in last 2 days, will replace as needed     # Thrombocytopenia: Lowest platelets = 21 in last 2 days, will monitor for bleeding   # Hypertension: Noted on problem list                         Diet: Orders Placed This Encounter      Low Fiber Diet     DVT Prophylaxis: Pneumatic Compression Devices  Lee: None  Code Status: No CPR- Do NOT Intubate    Medically Ready for Discharge: Anticipated Tomorrow       The patient's care was discussed with the Bedside Nurse and Patient.  I personally spent 45 minutes on chart review, documentation, coordination, and bedside management of patient.    Ori Albright MD, S  Hospitalist Service  [facility]  ______________________________________________________________________    Interval History   Nursing notes reviewed. Patient noted to desaturate with movement. Otherwise doing well. Stated she was having trouble breathing with walking and felt like her legs were \"giving out\" but feels better at rest.    A full 10+ point review of systems was performed and found to be negative with the exception of those items noted here.    Physical Exam   Temp: 98  F (36.7  C) Temp src: Oral BP: 124/50 Pulse: 62   Resp: 24 SpO2: 95 % O2 Device: Nasal cannula Oxygen Delivery: 2 LPM Height: 163.8 cm (5' 4.5\") Weight: 59.9 kg (132 lb 0.9 oz)  Estimated body mass index is 22.32 kg/m  as calculated from the following:    Height as of this encounter: 1.638 " "m (5' 4.5\").    Weight as of this encounter: 59.9 kg (132 lb 0.9 oz).    General: Very pleasant female resting comfortably in hospital bed.  Awake, alert, interactive.  HEENT: Normocephalic, atraumatic.  PERRL, EOMI.  Conjunctiva clear, sclerae anicteric.  Mucous membranes moist.  Cardiac: Very loud holosystolic murmur (slightly crescendo) heart loudest at RUSB and apex, less so at LUSB.  Trace peripheral edema.  Respiratory: Normal work of breathing.  Clear to auscultation bilaterally without wheezing, rales, or rhonchi.  GI: Normal, active bowel sounds.  Abdomen soft, nontender, nondistended.  : Deferred.  Musculoskeletal: Moving all extremities appropriately.  Skin: No rashes or abrasions on exposed skin.  Neurologic: Alert and oriented x4.  Cranial nerves II through XII grossly intact.  Psychologic: Appropriate mood and affect.    Data   All laboratory results and other diagnostic data from the past 24 hours is available in Epic and has been personally reviewed.    Recent Labs   Lab 01/17/25  0611 01/16/25  0810 01/16/25  0644 01/15/25  2215 01/15/25  1407 01/15/25  0800 01/14/25  1555   WBC  --   --  1.5*  --   --  1.4* 1.8*   HGB 7.9*  --  8.2*  8.2* 7.7*   < > 7.6* 5.2*   MCV  --   --  104*  --   --  102* 115*   PLT  --   --  21*  --   --  19* 21*   INR  --   --   --   --   --   --  1.23*   * 132*  --   --   --  134* 133*   POTASSIUM 4.1 3.9 4.2  --   --  4.7 4.8   CHLORIDE 99 96*  --   --   --  99 101   CO2 21* 21*  --   --   --  21* 18*   BUN 60.0* 63.4*  --   --   --  67.4* 75.2*   CR 1.60* 1.57*  --   --   --  1.57* 1.54*   ANIONGAP 12 15  --   --   --  14 14   NATALIA 9.1 9.3  --   --   --  9.1 9.3   * 138*  --   --   --  94 121*   ALBUMIN  --   --   --   --   --  3.9  --    PROTTOTAL  --   --   --   --   --  6.0*  --    BILITOTAL  --   --   --   --   --  1.2  --    ALKPHOS  --   --   --   --   --  86  --    ALT  --   --   --   --   --  9  --    AST  --   --   --   --   --  10  --     < > = " values in this interval not displayed.       Imaging results reviewed over the past 24 hrs:   Recent Results (from the past 24 hours)   Echocardiogram Complete   Result Value    LVEF  60-65%    Narrative    [id number]  [id number]  [id number]  [id number]^SHAHRAM^VALENTÍN     [facility]  Echocardiography Laboratory  [address] [place] [address]     Name: AIDA WILSON  MRN: [id number]  : 1933  Study Date: 2025 01:18 PM  Age: 89 yrs  Gender: Female  Patient Location: [facility]  Reason For Study: Murmur  Ordering Physician: VALENTÍN OMALLEY  Performed By: KAREL Dacosta     BSA: 1.6 m2  Height: 64 in  Weight: 132 lb  HR: 71  BP: 124/50 mmHg  ______________________________________________________________________________  Procedure  Echocardiogram with two-dimensional, color and spectral Doppler.  ______________________________________________________________________________  Interpretation Summary     The left ventricle is normal in structure, function and size.  There is borderline concentric left ventricular hypertrophy.  The visual ejection fraction is 60-65%.  There is moderate mitral annular calcification.  Mobile echodensity c/w possible torn chordae  There is severe (4+) mitral regurgitation.  There is moderate to mod-severe (2-3+) tricuspid regurgitation.  Moderate valvular aortic stenosis.  There is mild (1+) aortic regurgitation.  The rhythm was atrial fibrillation.  There is no comparison study available.  ______________________________________________________________________________  Left Ventricle  The left ventricle is normal in structure, function and size. There is  borderline concentric left ventricular hypertrophy. The visual ejection  fraction is 60-65%. No regional wall motion abnormalities noted.     Right Ventricle  The right ventricle is normal in structure, function and size.     Atria  There is severe biatrial enlargement.     Mitral Valve  There is moderate mitral annular  calcification. The mitral valve chordae are  thickened and/or calcified. Mobile echodensity c/w possible torn chordae.  There is severe (4+) mitral regurgitation. The mitral regurgitant jet is  eccentrically directed.     Tricuspid Valve  The tricuspid valve is not well visualized, but is grossly normal. There is  moderate to mod-severe (2-3+) tricuspid regurgitation.     Aortic Valve  There is mild (1+) aortic regurgitation. The mean AoV pressure gradient is  21.5 mmHg. The calculated aortic valve are is 0.91 cm^2. Moderate valvular  aortic stenosis.     Pulmonic Valve  The pulmonic valve is not well visualized.     Vessels  Normal size aorta.     Pericardium  There is no pericardial effusion.     Rhythm  The rhythm was atrial fibrillation.  ______________________________________________________________________________  MMode/2D Measurements & Calculations     IVSd: 1.1 cm  LVIDd: 4.1 cm  LVIDs: 2.8 cm  LVPWd: 1.1 cm  FS: 30.6 %  LV mass(C)d: 150.3 grams  LV mass(C)dI: 91.7 grams/m2  Ao root diam: 2.8 cm  LVOT diam: 1.9 cm  LVOT area: 2.9 cm2  Ao root diam index Ht(cm/m): 1.7  Ao root diam index BSA (cm/m2): 1.7  LA Volume (BP): 116.0 ml  LA Volume Index (BP): 70.7 ml/m2     RV Base: 4.7 cm  RWT: 0.53  TAPSE: 2.2 cm     Doppler Measurements & Calculations  MV max P.1 mmHg  MV mean P.5 mmHg  MV V2 VTI: 36.0 cm  MVA(VTI): 1.9 cm2  Ao V2 max: 307.7 cm/sec  Ao max P.9 mmHg  Ao V2 mean: 215.7 cm/sec  Ao mean P.5 mmHg  Ao V2 VTI: 74.9 cm  EVER(I,D): 0.91 cm2  EVER(V,D): 1.1 cm2  AI P1/2t: 396.5 msec  LV V1 max P.7 mmHg  LV V1 max: 119.0 cm/sec  LV V1 VTI: 23.7 cm  SV(LVOT): 68.1 ml  SI(LVOT): 41.6 ml/m2  PA V2 max: 134.7 cm/sec  PA max P.3 mmHg  PA acc time: 0.08 sec  TR max bg: 259.6 cm/sec  TR max P.2 mmHg  AV Bg Ratio (DI): 0.39  EVER Index (cm2/m2): 0.55  RV S Bg: 9.0 cm/sec     ______________________________________________________________________________  Report approved by: Fernando  MD Sav on 01/17/2025 02:59 PM           I personally reviewed: no images or EKG's today.

## 2025-01-18 VITALS
BODY MASS INDEX: 22 KG/M2 | SYSTOLIC BLOOD PRESSURE: 122 MMHG | RESPIRATION RATE: 24 BRPM | TEMPERATURE: 98.1 F | OXYGEN SATURATION: 91 % | HEART RATE: 62 BPM | WEIGHT: 132.06 LBS | HEIGHT: 65 IN | DIASTOLIC BLOOD PRESSURE: 44 MMHG

## 2025-01-18 LAB
ANION GAP SERPL CALCULATED.3IONS-SCNC: 10 MMOL/L (ref 7–15)
BUN SERPL-MCNC: 51.9 MG/DL (ref 8–23)
CALCIUM SERPL-MCNC: 9 MG/DL (ref 8.8–10.4)
CHLORIDE SERPL-SCNC: 101 MMOL/L (ref 98–107)
CREAT SERPL-MCNC: 1.59 MG/DL (ref 0.51–0.95)
EGFRCR SERPLBLD CKD-EPI 2021: 30 ML/MIN/1.73M2
ERYTHROCYTE [DISTWIDTH] IN BLOOD BY AUTOMATED COUNT: 25 % (ref 10–15)
GLUCOSE SERPL-MCNC: 98 MG/DL (ref 70–99)
HCO3 SERPL-SCNC: 21 MMOL/L (ref 22–29)
HCT VFR BLD AUTO: 24.2 % (ref 35–47)
HGB BLD-MCNC: 8.1 G/DL (ref 11.7–15.7)
MAGNESIUM SERPL-MCNC: 1.6 MG/DL (ref 1.7–2.3)
MCH RBC QN AUTO: 34.6 PG (ref 26.5–33)
MCHC RBC AUTO-ENTMCNC: 33.5 G/DL (ref 31.5–36.5)
MCV RBC AUTO: 103 FL (ref 78–100)
PHOSPHATE SERPL-MCNC: 3.1 MG/DL (ref 2.5–4.5)
PLATELET # BLD AUTO: 21 10E3/UL (ref 150–450)
POTASSIUM SERPL-SCNC: 4 MMOL/L (ref 3.4–5.3)
POTASSIUM SERPL-SCNC: 4.1 MMOL/L (ref 3.4–5.3)
RBC # BLD AUTO: 2.34 10E6/UL (ref 3.8–5.2)
SODIUM SERPL-SCNC: 132 MMOL/L (ref 135–145)
WBC # BLD AUTO: 1.4 10E3/UL (ref 4–11)

## 2025-01-18 PROCEDURE — 80048 BASIC METABOLIC PNL TOTAL CA: CPT | Performed by: STUDENT IN AN ORGANIZED HEALTH CARE EDUCATION/TRAINING PROGRAM

## 2025-01-18 PROCEDURE — 250N000011 HC RX IP 250 OP 636: Performed by: INTERNAL MEDICINE

## 2025-01-18 PROCEDURE — 84100 ASSAY OF PHOSPHORUS: CPT | Performed by: STUDENT IN AN ORGANIZED HEALTH CARE EDUCATION/TRAINING PROGRAM

## 2025-01-18 PROCEDURE — 85014 HEMATOCRIT: CPT | Performed by: STUDENT IN AN ORGANIZED HEALTH CARE EDUCATION/TRAINING PROGRAM

## 2025-01-18 PROCEDURE — 250N000013 HC RX MED GY IP 250 OP 250 PS 637: Performed by: INTERNAL MEDICINE

## 2025-01-18 PROCEDURE — 99222 1ST HOSP IP/OBS MODERATE 55: CPT | Performed by: INTERNAL MEDICINE

## 2025-01-18 PROCEDURE — 83735 ASSAY OF MAGNESIUM: CPT | Performed by: STUDENT IN AN ORGANIZED HEALTH CARE EDUCATION/TRAINING PROGRAM

## 2025-01-18 RX ORDER — FUROSEMIDE 40 MG/1
20 TABLET ORAL DAILY
Qty: 30 TABLET | Refills: 0 | Status: SHIPPED | OUTPATIENT
Start: 2025-01-18

## 2025-01-18 RX ORDER — METOPROLOL TARTRATE 25 MG/1
6.25 TABLET, FILM COATED ORAL 2 TIMES DAILY
Qty: 15 TABLET | Refills: 0 | Status: SHIPPED | OUTPATIENT
Start: 2025-01-18 | End: 2025-02-17

## 2025-01-18 RX ADMIN — LETROZOLE 2.5 MG: 2.5 TABLET ORAL at 08:15

## 2025-01-18 RX ADMIN — HEPARIN 5 ML: 100 SYRINGE at 14:16

## 2025-01-18 RX ADMIN — ACETAMINOPHEN 975 MG: 325 TABLET, FILM COATED ORAL at 00:08

## 2025-01-18 ASSESSMENT — ACTIVITIES OF DAILY LIVING (ADL)
ADLS_ACUITY_SCORE: 49

## 2025-01-18 NOTE — CONSULTS
[facility]    CARDIOLOGY CONSULT    Date of Admission:  1/14/2025  Date of Consult: January 18, 2025    ASSESSMENT:  89-year-old female seen for mitral regurgitation.  She also has rate controlled A-fib and other moderate valve issues.  Fortunately she has no significant cardiac symptoms.  Suspect anemia is her main symptom currently.  She appears euvolemic on exam with a dry weight around 135 points.  Her A-fib is well-controlled.  She probably has a torn cord of the posterior mitral leaflet causing severe eccentric mitral regurgitation.  There was significant MAC and a mean mitral gradient of 3, previously 4 to 5 mmHg.  At her advanced age and with her pancytopenia, she would certainly not be a surgical candidate.  Mitral valve is also not favorable for MitraClip with her significant mitral calcification and at least mild stenosis.  Only option therefore is ongoing medical therapy with rate control of her A-fib and diuretics.    RECOMMENDATIONS:  1.  Severe mitral regurgitation  -Medical therapy with rate control and diuretics    2.  A-fib  -Continue metoprolol for rate control  -No anticoagulation due to pancytopenia    Will arrange cardiology follow-up within about 2 months for monitoring medical management.    Chucho Oro MD  Cardiology - [facility]  Pager:  [phone]  Text Page  January 18, 2025    CODE STATUS:  No CPR- Do NOT Intubate    REASON FOR CONSULT: Mitral regurgitation    PRIMARY CARE PHYSICIAN:  Taj Gordillo    HISTORY OF PRESENT ILLNESS:  89 year old female seen for mitral regurgitation.  She has myelodysplastic syndrome, breast cancer, atrial fibrillation    Echo 2021 showed EF 65%, mild aortic stenosis, 2+ AI, 2+ TR, significant MAC with mitral mean 5 to 6 mmHg.    At baseline she lives in independent living.  Dry weight is about 135 pounds.  She is on metoprolol, spironolactone, and furosemide.  She denies any recent lower extremity edema, chest pain.  She  does have some occasional palpitations.    She is currently admitted for progressive anemia, initial hemoglobin of 5.  She may have had some mild GI bleeding, however hemoglobin has been stable after transfusion.  She feels comfortable resting in bed today.    PAST MEDICAL HISTORY:  I have reviewed this patient's medical history and updated it with pertinent information if needed.   Past Medical History:   Diagnosis Date    AF (atrial fibrillation) (H)     Atherosclerosis of aorta     CAD (coronary artery disease)     History of blood transfusion 02/22/2020    after and I&D - blood clot in breast after a biopsy    HTN (hypertension)     Hyperlipidemia     Myelodysplastic syndrome (H)     Overweight and obesity(278.0)     PONV (postoperative nausea and vomiting)        PAST SURGICAL HISTORY:  I have reviewed this patient's surgical history and updated it with pertinent information if needed.  Past Surgical History:   Procedure Laterality Date    APPENDECTOMY  1958    ENT SURGERY      tonsillectomy and adenoidectomy as a child    GYN SURGERY  1974    total hysterectomy     IR CHEST PORT PLACEMENT > 5 YRS OF AGE  5/24/2022    IRRIGATION AND DEBRIDEMENT BREAST Right 2/22/2020    Procedure: IRRIGATION AND DEBRIDEMENT, BREAST;  Surgeon: Avery Gloria MD;  Location: [facility] OR    IRRIGATION AND DEBRIDEMENT BREAST Right 2/22/2020    Procedure: IRRIGATION AND DEBRIDEMENT, BREAST;  Surgeon: Avery Gloria MD;  Location: [facility] OR    LUMPECTOMY BREAST, SEED LOCALIZATION, SENTINEL NODE Right 3/4/2020    Procedure: RIGHT BREAST SEED LOCALIZED LUMPECTOMY WITH RIGHT SENTINEL NODE BIOPSY;  Surgeon: Lali Pope MD;  Location: [facility] OR    ORTHOPEDIC SURGERY  2003, 2009    has had both knees replaced       HOME MEDICATIONS:  Prior to Admission Medications   Prescriptions Last Dose Informant Patient Reported? Taking?   acetaminophen (TYLENOL) 500 MG tablet 1/13/2025 Bedtime  Yes Yes   Sig: Take 500 mg by mouth every 6 hours as needed  for mild pain.   amoxicillin (AMOXIL) 500 MG capsule   Yes Yes   Sig: Take 2,000 mg by mouth as needed (Dental Appointments).   atorvastatin (LIPITOR) 20 MG tablet 1/13/2025 Bedtime  Yes Yes   Sig: Take 20 mg by mouth At Bedtime    furosemide (LASIX) 20 MG tablet 1/13/2025 Morning  Yes No   Sig: Take 40 mg by mouth daily.   letrozole (FEMARA) 2.5 MG tablet 1/14/2025 Morning  Yes Yes   Sig: Take 1 tablet by mouth daily.   luspatercept-aamt (REBLOZYL) 25 MG injection 1/7/2025  Yes Yes   Sig: Inject 1 mg/kg subcutaneously every 21 days.   metoprolol (TOPROL-XL) 25 MG 24 hr tablet 1/14/2025 Morning  Yes No   Sig: Take 25 mg by mouth 2 times daily   spironolactone (ALDACTONE) 25 MG tablet 1/14/2025 Morning  Yes No   Sig: Take 1 tablet by mouth daily.      Facility-Administered Medications: None       ALLERGIES:  Allergies   Allergen Reactions    Amlodipine Dizziness    Codeine Nausea    Flecainide Dizziness    Hydrochlorothiazide Other (See Comments)     Other reaction(s): Hyponatremia    Meperidine Nausea and Vomiting    Promethazine      Jittery    Ceftin [Cefuroxime] Itching and Rash    Tetracycline Rash    Vancomycin Itching and Rash       SOCIAL HISTORY:  I have reviewed this patient's social history and updated it with pertinent information if needed. Kayley Putnam  reports that she has never smoked. She has never used smokeless tobacco. She reports current alcohol use. She reports that she does not use drugs.    FAMILY HISTORY:  I have reviewed this patient's family history and updated it with pertinent information if needed.   Family History   Problem Relation Age of Onset    Cerebrovascular Disease Father        REVIEW OF SYSTEMS:  Constitutional:  No weight loss, fever, chills  HEENT:  Eyes:  No visual loss, blurred vision, double vision or yellow sclerae. No hearing loss, sneezing, congestion, runny nose or sore throat.  Skin:  No rash or itching.  Cardiovascular: per HPI  Respiratory: per HPI  GI:  No  anorexia, nausea, vomiting or diarrhea. No abdominal pain or blood.  :  No dysurea, hematuria  Neurologic:  No headache, paralysis, ataxia, numbness or tingling in the extremities. No change in bowel or bladder control.  Musculoskeletal:  No muscle pain  Hematologic:  No bleeding or bruising.  Lymphatics:  No enlarged nodes. No history of splenectomy.  Endocrine:  No reports of sweating, cold or heat intolerance. No polyuria or polydipsia.  Allergies:  No history of asthma, hives, eczema or rhinitis.    PHYSICAL EXAM:  Temp: 98.1  F (36.7  C) Temp src: Oral BP: 122/44       SpO2: 91 % O2 Device: None (Room air) Oxygen Delivery: 2 LPM  Vital Signs with Ranges  Temp:  [98  F (36.7  C)-98.2  F (36.8  C)] 98.1  F (36.7  C)  BP: (119-122)/(44-45) 122/44  SpO2:  [91 %-96 %] 91 %  132 lbs .89 oz    Constitutional: awake, alert, no distress  Eyes: PERRL, sclera nonicteric  ENT: trachea midline  Respiratory: Lungs clear  Cardiovascular: Regular rate, totally irregular rhythm, 3/6 mid peaking systolic murmur at the upper sternal border, 3/6 holosystolic murmur at the apex, no edema  GI: nondistended, nontender, bowel sounds present  Lymph/Hematologic: no lymphadenopathy  Skin: dry, no rash  Musculoskeletal: good muscle tone, strength 5/5 in upper and lower extremities  Neurologic: no focal deficits  Neuropsychiatric: appropriate affact    No intake or output data in the 24 hours ending 01/18/25 1114    Clinically Significant Risk Factors         # Hyponatremia: Lowest Na = 132 mmol/L in last 2 days, will monitor as appropriate     # Hypomagnesemia: Lowest Mg = 1.6 mg/dL in last 2 days, will replace as needed     # Thrombocytopenia: Lowest platelets = 21 in last 2 days, will monitor for bleeding   # Hypertension: Noted on problem list                      Cardiac Arrhythmia: Atrial fibrillation: Chronic  Non-Rheumatic Valve Disease: Aortic valve stenosis  Mitral valve insufficiency  Tricuspid valve  insufficiency      Thrombocytopenia Including Purpuric, HIT, & Other Platelet Defects: Thrombocytopenia, unspecified    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

## 2025-01-18 NOTE — PROGRESS NOTES
Oncology Chart Check     No new recommendations at this time.   See note from 1/17/25   Blood counts stable from day prior     Patrick Dreyer, DO  [facility]

## 2025-01-18 NOTE — PLAN OF CARE
"Goal Outcome Evaluation:      Plan of Care Reviewed With: patient          Outcome Evaluation: A&Ox4, Tele afib with CVR. VSS with exception of flucuating O2 saturations. Pt weaned off O2 on initial assessment - maintaining 97-99% on 2L. At rest pt O2 saturation on room air flucuating from 86-94%. Pt denying SOB. FERRERA present with activity however pt recovering with rest. Tolerating low fiber diet - continues without any obvious source of bleeding or bowel movement. Up with assistance of 1 and walker. Pt receiving chest xray this evening. Expecting cardiology consult tomrorow. Of note, pt has arranged discharge transporation tomorrow afternoon.    Problem: Adult Inpatient Plan of Care  Goal: Plan of Care Review  Description: The Plan of Care Review/Shift note should be completed every shift.  The Outcome Evaluation is a brief statement about your assessment that the patient is improving, declining, or no change.  This information will be displayed automatically on your shift  note.  Outcome: Progressing  Flowsheets (Taken 1/17/2025 2012)  Outcome Evaluation: A&Ox4, Tele afib with CVR. VSS with exception of flucuating O2 saturations. Pt weaned off O2 on initial assessment - maintaining 97-99% on 2L. At rest pt O2 saturation on room air flucuating from 86-94%. Pt denying SOB. FERRERA present with activity however pt recovering with rest. Tolerating low fiber diet - continues without any obvious source of bleeding or bowel movement. Up with assistance of 1 and walker. Pt receiving chest xray this evening. Expecting cardiology consult tomrorow. Of note, pt has arranged discharge transporation tomorrow.  Plan of Care Reviewed With: patient  Goal: Patient-Specific Goal (Individualized)  Description: You can add care plan individualizations to a care plan. Examples of Individualization might be:  \"Parent requests to be called daily at 9am for status\", \"I have a hard time hearing out of my right ear\", or \"Do not touch me to wake " "me up as it startles  me\".  Outcome: Progressing  Goal: Absence of Hospital-Acquired Illness or Injury  Outcome: Progressing  Intervention: Identify and Manage Fall Risk  Recent Flowsheet Documentation  Taken 1/17/2025 1600 by Philip Morales, RN  Safety Promotion/Fall Prevention:   activity supervised   assistive device/personal items within reach   increase visualization of patient   mobility aid in reach   nonskid shoes/slippers when out of bed   patient and family education  Intervention: Prevent and Manage VTE (Venous Thromboembolism) Risk  Recent Flowsheet Documentation  Taken 1/17/2025 1600 by Philip Morales, RN  VTE Prevention/Management: SCDs off (sequential compression devices)  Goal: Optimal Comfort and Wellbeing  Outcome: Progressing  Goal: Readiness for Transition of Care  Outcome: Progressing       "

## 2025-01-18 NOTE — PLAN OF CARE
"Goal Outcome Evaluation:      Plan of Care Reviewed With: patient    Overall Patient Progress: improvingOverall Patient Progress: improving    Outcome Evaluation: Pt AOx4, [employer] [place] [place], Denies pain. Up assist of 1. Voided and passed gas this AM but no BM. CHG done for accessed port. SL. Labs drawn. Pt desats to 82-84% with activity, but able to recover to 90%+ over a few minutes with rest. Refusing home oxygen / oxygen here. Pending results of CBC. Plan is to discharge home today 1349-1196 WC transport.      Problem: Adult Inpatient Plan of Care  Goal: Plan of Care Review  Description: The Plan of Care Review/Shift note should be completed every shift.  The Outcome Evaluation is a brief statement about your assessment that the patient is improving, declining, or no change.  This information will be displayed automatically on your shift  note.  Outcome: Progressing  Flowsheets (Taken 1/18/2025 1039)  Outcome Evaluation: Pt AOx4, [employer] [place], Denies pain. Up assist of 1. Voided and passed gas this AM but no BM. CHG done for accessed port. SL. Labs drawn. Pt desats to 82-84% with activity, but able to recover to 90%+ over a few minutes with rest. Refusing home oxygen / oxygen here. Pending results of CBC. Plan is to discharge home today 5090-0280 WC transport.  Plan of Care Reviewed With: patient  Overall Patient Progress: improving  Goal: Patient-Specific Goal (Individualized)  Description: You can add care plan individualizations to a care plan. Examples of Individualization might be:  \"Parent requests to be called daily at 9am for status\", \"I have a hard time hearing out of my right ear\", or \"Do not touch me to wake me up as it startles  me\".  Outcome: Progressing  Goal: Absence of Hospital-Acquired Illness or Injury  Outcome: Progressing  Intervention: Identify and Manage Fall Risk  Recent Flowsheet Documentation  Taken 1/18/2025 7587 by Solitario Wren RN  Safety Promotion/Fall Prevention:   activity supervised   safety " round/check completed  Intervention: Prevent Skin Injury  Recent Flowsheet Documentation  Taken 1/18/2025 0822 by Solitario Wren, RN  Body Position:   position changed independently   supine, head elevated  Goal: Optimal Comfort and Wellbeing  Outcome: Progressing  Goal: Readiness for Transition of Care  Outcome: Progressing     Problem: Gastrointestinal Bleeding  Goal: Optimal Coping with Acute Illness  Outcome: Progressing  Goal: Hemostasis  Outcome: Progressing     Problem: Fall Injury Risk  Goal: Absence of Fall and Fall-Related Injury  Outcome: Progressing  Intervention: Promote Injury-Free Environment  Recent Flowsheet Documentation  Taken 1/18/2025 0822 by Solitario Wren, RN  Safety Promotion/Fall Prevention:   activity supervised   safety round/check completed

## 2025-01-18 NOTE — PLAN OF CARE
"Pt alert and oriented. [place]. C/o headache, PRN Tylenol given with relief. Up with 1 assits, gait belt and walker. On RA, desats to mid 80's with exertion. Pt refused oxygen. Port saline locked. Awaiting cardiology consult.   Problem: Adult Inpatient Plan of Care  Goal: Plan of Care Review  Description: The Plan of Care Review/Shift note should be completed every shift.  The Outcome Evaluation is a brief statement about your assessment that the patient is improving, declining, or no change.  This information will be displayed automatically on your shift  note.  Outcome: Progressing  Flowsheets (Taken 1/18/2025 0728)  Outcome Evaluation: Pt alert and oriented. [place]. C/o headache, PRN Tylenol given with relief. Up with 1 assits, gait belt and walker. On RA, desats to mid 80's with exertion. Pt refused oxygen. Port saline locked.  Plan of Care Reviewed With: patient  Overall Patient Progress: improving  Goal: Patient-Specific Goal (Individualized)  Description: You can add care plan individualizations to a care plan. Examples of Individualization might be:  \"Parent requests to be called daily at 9am for status\", \"I have a hard time hearing out of my right ear\", or \"Do not touch me to wake me up as it startles  me\".  Outcome: Progressing  Goal: Absence of Hospital-Acquired Illness or Injury  Outcome: Progressing  Intervention: Identify and Manage Fall Risk  Recent Flowsheet Documentation  Taken 1/18/2025 0120 by Maeve Eric RN  Safety Promotion/Fall Prevention:   activity supervised   assistive device/personal items within reach   increase visualization of patient   mobility aid in reach   nonskid shoes/slippers when out of bed   patient and family education  Intervention: Prevent Skin Injury  Recent Flowsheet Documentation  Taken 1/18/2025 0120 by Maeve Eric RN  Body Position: position changed independently  Skin Protection:   tubing/devices free from skin contact   transparent dressing " maintained  Intervention: Prevent and Manage VTE (Venous Thromboembolism) Risk  Recent Flowsheet Documentation  Taken 1/18/2025 0120 by Maeve Eric, RN  VTE Prevention/Management: SCDs off (sequential compression devices)  Intervention: Prevent Infection  Recent Flowsheet Documentation  Taken 1/18/2025 0120 by Maeve Eric, RN  Infection Prevention:   cohorting utilized   environmental surveillance performed   equipment surfaces disinfected   hand hygiene promoted   rest/sleep promoted  Goal: Optimal Comfort and Wellbeing  Outcome: Progressing  Intervention: Monitor Pain and Promote Comfort  Recent Flowsheet Documentation  Taken 1/18/2025 0008 by Maeve Eric, RN  Pain Management Interventions: medication (see MAR)  Goal: Readiness for Transition of Care  Outcome: Progressing     Problem: Gastrointestinal Bleeding  Goal: Optimal Coping with Acute Illness  Outcome: Progressing  Goal: Hemostasis  Outcome: Progressing     Problem: Fall Injury Risk  Goal: Absence of Fall and Fall-Related Injury  Outcome: Progressing  Intervention: Identify and Manage Contributors  Recent Flowsheet Documentation  Taken 1/18/2025 0120 by Maeve Eric, RN  Medication Review/Management: medications reviewed  Intervention: Promote Injury-Free Environment  Recent Flowsheet Documentation  Taken 1/18/2025 0120 by Maeve Eric, RN  Safety Promotion/Fall Prevention:   activity supervised   assistive device/personal items within reach   increase visualization of patient   mobility aid in reach   nonskid shoes/slippers when out of bed   patient and family education   Goal Outcome Evaluation:      Plan of Care Reviewed With: patient    Overall Patient Progress: improvingOverall Patient Progress: improving    Outcome Evaluation: Pt alert and oriented. [place]. C/o headache, PRN Tylenol given with relief. Up with 1 assits, gait belt and walker. On RA, desats to mid 80's with exertion. Pt refused oxygen. Port  saline locked.

## 2025-01-19 NOTE — PROGRESS NOTES
Physical Therapy Discharge Summary    Reason for therapy discharge:    Discharged to home.    Progress towards therapy goal(s). See goals on Care Plan in [facility] electronic health record for goal details.  Goals partially met.  Barriers to achieving goals:   discharge from facility.    Therapy recommendation(s):    Continued therapy is recommended.  Rationale/Recommendations:  Anticipate she will progress to mod I for household mobility with use of 4WW. She uses a power wheelchair for longer distances, such as to the dining room. Patient would likely benefit from HHPT to promote safety and independence with mobility at [facility]..

## 2025-01-20 ENCOUNTER — PATIENT OUTREACH (OUTPATIENT)
Dept: CARE COORDINATION | Facility: CLINIC | Age: OVER 89
End: 2025-01-20
Payer: MEDICARE

## 2025-01-20 ENCOUNTER — TELEPHONE (OUTPATIENT)
Dept: CARDIOLOGY | Facility: CLINIC | Age: OVER 89
End: 2025-01-20
Payer: MEDICARE

## 2025-01-20 DIAGNOSIS — E78.5 HYPERLIPIDEMIA, UNSPECIFIED HYPERLIPIDEMIA TYPE: ICD-10-CM

## 2025-01-20 DIAGNOSIS — I10 HYPERTENSION, UNSPECIFIED TYPE: ICD-10-CM

## 2025-01-20 DIAGNOSIS — I25.10 CAD (CORONARY ARTERY DISEASE): ICD-10-CM

## 2025-01-20 DIAGNOSIS — I34.0 SEVERE MITRAL REGURGITATION: Primary | ICD-10-CM

## 2025-01-20 NOTE — PROGRESS NOTES
"  [facility]: Transitions of Care Outreach  Chief Complaint   Patient presents with    Clinic Care Coordination - Post Hospital       Most Recent Admission Date: 1/14/2025   Most Recent Admission Diagnosis: Bradycardia - R00.1  Junctional bradycardia - R00.1  ALEXEI (acute kidney injury) - N17.9  Gastrointestinal hemorrhage with melena - K92.1  Anemia, unspecified type - D64.9     Most Recent Discharge Date: 1/18/2025   Most Recent Discharge Diagnosis: Anemia, unspecified type - D64.9  ALEXEI (acute kidney injury) - N17.9  Bradycardia - R00.1  Junctional bradycardia - R00.1  Gastrointestinal hemorrhage with melena - K92.1  Persistent atrial fibrillation (H) - I48.19     Transitions of Care Assessment    Discharge Assessment  How are you doing now that you are home?: \" I am doing ok, taking things very slow \"  How are your symptoms? (Red Flag symptoms escalate to triage hotline per guidelines): Improved  Do you know how to contact your clinic care team if you have future questions or changes to your health status? : Yes  Does the patient have their discharge instructions? : Yes  Does the patient have questions regarding their discharge instructions? : No  Were you started on any new medications or were there changes to any of your previous medications? : Yes  Does the patient have all of their medications?: Yes  Do you have questions regarding any of your medications? : No  Do you have all of your needed medical supplies or equipment (DME)?  (i.e. oxygen tank, CPAP, cane, etc.): Yes    Post-op ([facility] CTA Only)  If the patient had a surgery or procedure, do they have any questions for a nurse?: No        Follow up Plan     Discharge Follow-Up  Discharge follow up appointment scheduled in alignment with recommended follow up timeframe or Transitions of Risk Category? (Low = within 30 days; Moderate= within 14 days; High= within 7 days): Yes  Discharge Follow Up Appointment Date: 01/28/25  Discharge Follow " Up Appointment Scheduled with?: Specialty Care Provider    No future appointments.    Outpatient Plan as outlined on AVS reviewed with patient.    For any urgent concerns, please contact our 24 hour nurse triage line: [phone] ([phone])       LISA Cordero

## 2025-01-20 NOTE — TELEPHONE ENCOUNTER
Placed orders per .       ----- Message from Chucho Oro sent at 1/18/2025 11:47 AM CST -----  Regarding: Hospital follow-up  This patient will be discharging in a few days from [facility], she has newly diagnosed severe mitral regurgitation. She needs a 6 to 8-week follow-up in [place].    Thanks,  Vidal Encarnacion RN  [facility]

## 2025-01-21 ENCOUNTER — TELEPHONE (OUTPATIENT)
Dept: CARDIOLOGY | Facility: CLINIC | Age: OVER 89
End: 2025-01-21
Payer: MEDICARE

## 2025-01-24 NOTE — DISCHARGE SUMMARY
[facility]  Hospitalist Discharge Summary      Date of Admission:  1/14/2025  Date of Discharge:  1/18/2025  3:20 PM  Discharging Provider: Ori Albright MD  Discharge Service: Hospitalist Service    Discharge Diagnoses   Acute on chronic anemia  Pancytopenia  Myelodysplastic syndrome  Acute on chronic kidney disease  Acute hypoxic respiratory failure  Severe mitral regurgitation  Torn chordae tendon  Moderate aortic grade regurgitation  Pulmonary hypertension  Atrial fibrillation  Hyponatremia  Hyperchloridemia  Weakness    Clinically Significant Risk Factors          Follow-ups Needed After Discharge   Follow-up Appointments       Follow Up and recommended labs and tests      Follow up with primary care provider in 1-2 weeks.  Check Hgb.                Discharge Disposition   Discharged to short-term care facility  Condition at discharge: Stable    Hospital Course   Kayley Putnam is a 89 year old female with past medical history significant for MDS and breast cancer who presented to [facility] on 1/14/2025 with severe, recurrent anemia and acute kidney injury.     Initially presented with possible concern for GI bleeding in setting of melanotic stools. Discussed with GI earlier in admission, who recommended improving patient's coagulopathy status first prior to considering endoscopic evaluation. Now without recurrent evidence of GI bleeding. Hgb initially 5.2--> 7.6 after 2U PRBC. Now tolerating low fiber diet.     Initially placed on 2L on admission, now weaned to room air. Morning of 1/17/2025 noted to have desaturations during ambulation requiring restarting supplemental O2. Repeat TTE obtained in setting of clear lungs but very loud murmur, showing severe MR due to a torn chordae tendon. Cardiology was consulted who discussed with the patient that she is not a candidate for procedural intervention given her underlying medical comorbidity and age. Ultimately, patient will be  managed with rate control of her atrial fibrillation and diuretics. Patient stable for discharge to TCU.       Consultations This Hospital Stay   GASTROENTEROLOGY IP CONSULT  CARE MANAGEMENT / SOCIAL WORK IP CONSULT  HEMATOLOGY & ONCOLOGY IP CONSULT  PHYSICAL THERAPY ADULT IP CONSULT  OCCUPATIONAL THERAPY ADULT IP CONSULT  NUTRITION SERVICES ADULT IP CONSULT  CARDIOLOGY IP CONSULT  PHYSICAL THERAPY ADULT IP CONSULT  OCCUPATIONAL THERAPY ADULT IP CONSULT    Code Status   No CPR- Do NOT Intubate    Time Spent on this Encounter   I, Ori Albright MD, personally saw the patient today and spent greater than 30 minutes discharging this patient.       Ori Albright MD  [facility]  [address] [place] [address]  Phone: [phone]  Fax: [phone]  ______________________________________________________________________    Physical Exam     General: Very pleasant female resting comfortably in hospital bed.  Awake, alert, interactive.  HEENT: Normocephalic, atraumatic.  PERRL, EOMI.  Conjunctiva clear, sclerae anicteric.  Mucous membranes moist.  Cardiac: Very loud holosystolic murmur (slightly crescendo) heart loudest at RUSB and apex, less so at LUSB.  Trace peripheral edema.  Respiratory: Normal work of breathing.  Clear to auscultation bilaterally without wheezing, rales, or rhonchi.  GI: Normal, active bowel sounds.  Abdomen soft, nontender, nondistended.  : Deferred.  Musculoskeletal: Moving all extremities appropriately.  Skin: No rashes or abrasions on exposed skin.  Neurologic: Alert and oriented x4.  Cranial nerves II through XII grossly intact.  Psychologic: Appropriate mood and affect.       Primary Care Physician   Taj Gordillo    Discharge Orders      General info for SNF    Length of Stay Estimate: Short Term Care: Estimated # of Days <30  Condition at Discharge: Stable  Level of care:skilled   Rehabilitation Potential: Fair  Admission H&P remains valid and up-to-date:  Yes  Recent Chemotherapy: N/A  Use Nursing Home Standing Orders: Yes     Follow Up and recommended labs and tests    Follow up with primary care provider in 1-2 weeks.  Check Hgb.     Reason for your hospital stay    You were admitted to the hospital for anemia and possible GI bleeding. While here, you were found to have mitral regurgitation     Activity - Up with nursing assistance     Activity - Up with assistive device     No CPR- Do NOT Intubate     Physical Therapy Adult Consult    Evaluate and treat as clinically indicated.    Reason:  Weakness     Occupational Therapy Adult Consult    Evaluate and treat as clinically indicated.    Reason:  ADLs     Fall precautions     Diet    Follow this diet upon discharge: Current Diet:Orders Placed This Encounter      Snacks/Supplements Adult: Ensure Enlive; With Meals      Low Fiber Diet       Significant Results and Procedures   Most Recent 3 CBC's:  Recent Labs   Lab Test 01/18/25  1008 01/17/25  0611 01/16/25  0644 01/15/25  1407 01/15/25  0800   WBC 1.4*  --  1.5*  --  1.4*   HGB 8.1* 7.9* 8.2*  8.2*   < > 7.6*   *  --  104*  --  102*   PLT 21*  --  21*  --  19*    < > = values in this interval not displayed.     Most Recent 3 BMP's:  Recent Labs   Lab Test 01/18/25  0610 01/17/25  0611 01/16/25  0810   * 132* 132*   POTASSIUM 4.0  4.1 4.1 3.9   CHLORIDE 101 99 96*   CO2 21* 21* 21*   BUN 51.9* 60.0* 63.4*   CR 1.59* 1.60* 1.57*   ANIONGAP 10 12 15   NATALIA 9.0 9.1 9.3   GLC 98 100* 138*     Most Recent 2 LFT's:  Recent Labs   Lab Test 01/15/25  0800   AST 10   ALT 9   ALKPHOS 86   BILITOTAL 1.2   ,   Results for orders placed or performed during the hospital encounter of 01/14/25   XR Chest Port 1 View    Narrative    EXAM: XR CHEST PORT 1 VIEW  LOCATION: [facility]  DATE: 1/17/2025    INDICATION: new oxygen needs on ambulation  COMPARISON: None.      Impression    IMPRESSION: Cardiomegaly. No acute infiltrates.   Echocardiogram  Complete     Value    LVEF  60-65%    [facility]    [id number]  [id number]  [id number]  [id number]^[email or web address]^VALENTÍN     [facility]  Echocardiography Laboratory  [address] [place] [address]     Name: AIDA WILSON  MRN: [id number]  : 1933  Study Date: 2025 01:18 PM  Age: 89 yrs  Gender: Female  Patient Location: [facility]  Reason For Study: Murmur  Ordering Physician: VALENTÍN OMALLEY  Performed By: KAREL Dacosta     BSA: 1.6 m2  Height: 64 in  Weight: 132 lb  HR: 71  BP: 124/50 mmHg  ______________________________________________________________________________  Procedure  Echocardiogram with two-dimensional, color and spectral Doppler.  ______________________________________________________________________________  Interpretation Summary     The left ventricle is normal in structure, function and size.  There is borderline concentric left ventricular hypertrophy.  The visual ejection fraction is 60-65%.  There is moderate mitral annular calcification.  Mobile echodensity c/w possible torn chordae  There is severe (4+) mitral regurgitation.  There is moderate to mod-severe (2-3+) tricuspid regurgitation.  Moderate valvular aortic stenosis.  There is mild (1+) aortic regurgitation.  The rhythm was atrial fibrillation.  There is no comparison study available.  ______________________________________________________________________________  Left Ventricle  The left ventricle is normal in structure, function and size. There is  borderline concentric left ventricular hypertrophy. The visual ejection  fraction is 60-65%. No regional wall motion abnormalities noted.     Right Ventricle  The right ventricle is normal in structure, function and size.     Atria  There is severe biatrial enlargement.     Mitral Valve  There is moderate mitral annular calcification. The mitral valve chordae are  thickened and/or calcified. Mobile echodensity c/w possible torn chordae.  There is severe (4+) mitral  regurgitation. The mitral regurgitant jet is  eccentrically directed.     Tricuspid Valve  The tricuspid valve is not well visualized, but is grossly normal. There is  moderate to mod-severe (2-3+) tricuspid regurgitation.     Aortic Valve  There is mild (1+) aortic regurgitation. The mean AoV pressure gradient is  21.5 mmHg. The calculated aortic valve are is 0.91 cm^2. Moderate valvular  aortic stenosis.     Pulmonic Valve  The pulmonic valve is not well visualized.     Vessels  Normal size aorta.     Pericardium  There is no pericardial effusion.     Rhythm  The rhythm was atrial fibrillation.  ______________________________________________________________________________  MMode/2D Measurements & Calculations     IVSd: 1.1 cm  LVIDd: 4.1 cm  LVIDs: 2.8 cm  LVPWd: 1.1 cm  FS: 30.6 %  LV mass(C)d: 150.3 grams  LV mass(C)dI: 91.7 grams/m2  Ao root diam: 2.8 cm  LVOT diam: 1.9 cm  LVOT area: 2.9 cm2  Ao root diam index Ht(cm/m): 1.7  Ao root diam index BSA (cm/m2): 1.7  LA Volume (BP): 116.0 ml  LA Volume Index (BP): 70.7 ml/m2     RV Base: 4.7 cm  RWT: 0.53  TAPSE: 2.2 cm     Doppler Measurements & Calculations  MV max P.1 mmHg  MV mean P.5 mmHg  MV V2 VTI: 36.0 cm  MVA(VTI): 1.9 cm2  Ao V2 max: 307.7 cm/sec  Ao max P.9 mmHg  Ao V2 mean: 215.7 cm/sec  Ao mean P.5 mmHg  Ao V2 VTI: 74.9 cm  EVER(I,D): 0.91 cm2  EVER(V,D): 1.1 cm2  AI P1/2t: 396.5 msec  LV V1 max P.7 mmHg  LV V1 max: 119.0 cm/sec  LV V1 VTI: 23.7 cm  SV(LVOT): 68.1 ml  SI(LVOT): 41.6 ml/m2  PA V2 max: 134.7 cm/sec  PA max P.3 mmHg  PA acc time: 0.08 sec  TR max bg: 259.6 cm/sec  TR max P.2 mmHg  AV Bg Ratio (DI): 0.39  EVER Index (cm2/m2): 0.55  RV S Bg: 9.0 cm/sec     ______________________________________________________________________________  Report approved by: Fernando Ang MD on 2025 02:59 PM             Discharge Medications   Discharge Medication List as of 2025  3:02 PM        START taking these  medications    Details   metoprolol tartrate (LOPRESSOR) 25 MG tablet Take 0.25 tablets (6.25 mg) by mouth 2 times daily., Disp-15 tablet, R-0, E-Prescribe           CONTINUE these medications which have CHANGED    Details   furosemide (LASIX) 40 MG tablet Take 0.5 tablets (20 mg) by mouth daily., Disp-30 tablet, R-0, E-Prescribe           CONTINUE these medications which have NOT CHANGED    Details   acetaminophen (TYLENOL) 500 MG tablet Take 500 mg by mouth every 6 hours as needed for mild pain., Historical      amoxicillin (AMOXIL) 500 MG capsule Take 2,000 mg by mouth as needed (Dental Appointments)., R-2, Historical      atorvastatin (LIPITOR) 20 MG tablet Take 20 mg by mouth At Bedtime , Historical      letrozole (FEMARA) 2.5 MG tablet Take 1 tablet by mouth daily., Historical      luspatercept-aamt (REBLOZYL) 25 MG injection Inject 1 mg/kg subcutaneously every 21 days., Historical           STOP taking these medications       metoprolol (TOPROL-XL) 25 MG 24 hr tablet Comments:   Reason for Stopping:         spironolactone (ALDACTONE) 25 MG tablet Comments:   Reason for Stopping:             Allergies   Allergies   Allergen Reactions    Amlodipine Dizziness    Codeine Nausea    Flecainide Dizziness    Hydrochlorothiazide Other (See Comments)     Other reaction(s): Hyponatremia    Meperidine Nausea and Vomiting    Promethazine      Jittery    Ceftin [Cefuroxime] Itching and Rash    Tetracycline Rash    Vancomycin Itching and Rash

## 2025-02-03 ENCOUNTER — HOSPITAL ENCOUNTER (EMERGENCY)
Facility: CLINIC | Age: OVER 89
Discharge: HOME | End: 2025-02-03
Attending: EMERGENCY MEDICINE | Admitting: EMERGENCY MEDICINE
Payer: MEDICARE

## 2025-02-03 VITALS
SYSTOLIC BLOOD PRESSURE: 147 MMHG | HEIGHT: 64 IN | WEIGHT: 135 LBS | BODY MASS INDEX: 23.05 KG/M2 | DIASTOLIC BLOOD PRESSURE: 52 MMHG | HEART RATE: 63 BPM | RESPIRATION RATE: 23 BRPM | TEMPERATURE: 97.8 F | OXYGEN SATURATION: 96 %

## 2025-02-03 DIAGNOSIS — D46.9 MYELODYSPLASTIC SYNDROME (H): ICD-10-CM

## 2025-02-03 DIAGNOSIS — D64.9 CHRONIC ANEMIA: ICD-10-CM

## 2025-02-03 LAB
ABO + RH BLD: NORMAL
ANION GAP SERPL CALCULATED.3IONS-SCNC: 12 MMOL/L (ref 7–15)
ATRIAL RATE - MUSE: NORMAL BPM
BASOPHILS # BLD MANUAL: 0 10E3/UL (ref 0–0.2)
BASOPHILS NFR BLD MANUAL: 0 %
BLASTS # BLD MANUAL: 0.1 10E3/UL
BLASTS NFR BLD MANUAL: 3 %
BLD GP AB SCN SERPL QL: NEGATIVE
BLD PROD TYP BPU: NORMAL
BLOOD COMPONENT TYPE: NORMAL
BUN SERPL-MCNC: 47.1 MG/DL (ref 8–23)
CALCIUM SERPL-MCNC: 9.5 MG/DL (ref 8.8–10.4)
CHLORIDE SERPL-SCNC: 99 MMOL/L (ref 98–107)
CODING SYSTEM: NORMAL
CREAT SERPL-MCNC: 1.42 MG/DL (ref 0.51–0.95)
CROSSMATCH: NORMAL
DIASTOLIC BLOOD PRESSURE - MUSE: NORMAL MMHG
EGFRCR SERPLBLD CKD-EPI 2021: 35 ML/MIN/1.73M2
ELLIPTOCYTES BLD QL SMEAR: ABNORMAL
EOSINOPHIL # BLD MANUAL: 0 10E3/UL (ref 0–0.7)
EOSINOPHIL NFR BLD MANUAL: 0 %
ERYTHROCYTE [DISTWIDTH] IN BLOOD BY AUTOMATED COUNT: 26.5 % (ref 10–15)
FRAGMENTS BLD QL SMEAR: SLIGHT
GLUCOSE SERPL-MCNC: 84 MG/DL (ref 70–99)
HCO3 SERPL-SCNC: 22 MMOL/L (ref 22–29)
HCT VFR BLD AUTO: 22.8 % (ref 35–47)
HGB BLD-MCNC: 7.4 G/DL (ref 11.7–15.7)
INTERPRETATION ECG - MUSE: NORMAL
ISSUE DATE AND TIME: NORMAL
LYMPHOCYTES # BLD MANUAL: 0.8 10E3/UL (ref 0.8–5.3)
LYMPHOCYTES NFR BLD MANUAL: 43 %
MAGNESIUM SERPL-MCNC: 1.8 MG/DL (ref 1.7–2.3)
MCH RBC QN AUTO: 36.1 PG (ref 26.5–33)
MCHC RBC AUTO-ENTMCNC: 32.5 G/DL (ref 31.5–36.5)
MCV RBC AUTO: 111 FL (ref 78–100)
METAMYELOCYTES # BLD MANUAL: 0 10E3/UL
METAMYELOCYTES NFR BLD MANUAL: 1 %
MONOCYTES # BLD MANUAL: 0 10E3/UL (ref 0–1.3)
MONOCYTES NFR BLD MANUAL: 2 %
MYELOCYTES # BLD MANUAL: 0 10E3/UL
MYELOCYTES NFR BLD MANUAL: 2 %
NEUTROPHILS # BLD MANUAL: 0.8 10E3/UL (ref 1.6–8.3)
NEUTROPHILS NFR BLD MANUAL: 47 %
NRBC # BLD AUTO: 0.2 10E3/UL
NRBC BLD MANUAL-RTO: 13 %
P AXIS - MUSE: NORMAL DEGREES
PLAT MORPH BLD: ABNORMAL
PLATELET # BLD AUTO: 22 10E3/UL (ref 150–450)
POTASSIUM SERPL-SCNC: 5.1 MMOL/L (ref 3.4–5.3)
PR INTERVAL - MUSE: NORMAL MS
PROMYELOCYTES # BLD MANUAL: 0 10E3/UL
PROMYELOCYTES NFR BLD MANUAL: 2 %
QRS DURATION - MUSE: 92 MS
QT - MUSE: 452 MS
QTC - MUSE: 428 MS
R AXIS - MUSE: -45 DEGREES
RBC # BLD AUTO: 2.05 10E6/UL (ref 3.8–5.2)
RBC MORPH BLD: ABNORMAL
SODIUM SERPL-SCNC: 133 MMOL/L (ref 135–145)
SPECIMEN EXP DATE BLD: NORMAL
SYSTOLIC BLOOD PRESSURE - MUSE: NORMAL MMHG
T AXIS - MUSE: 60 DEGREES
UNIT ABO/RH: NORMAL
UNIT NUMBER: NORMAL
UNIT STATUS: NORMAL
UNIT TYPE ISBT: 6200
VENTRICULAR RATE- MUSE: 54 BPM
WBC # BLD AUTO: 1.8 10E3/UL (ref 4–11)

## 2025-02-03 PROCEDURE — 99291 CRITICAL CARE FIRST HOUR: CPT

## 2025-02-03 PROCEDURE — 82435 ASSAY OF BLOOD CHLORIDE: CPT | Performed by: EMERGENCY MEDICINE

## 2025-02-03 PROCEDURE — P9016 RBC LEUKOCYTES REDUCED: HCPCS | Performed by: EMERGENCY MEDICINE

## 2025-02-03 PROCEDURE — 250N000011 HC RX IP 250 OP 636: Performed by: EMERGENCY MEDICINE

## 2025-02-03 PROCEDURE — 36415 COLL VENOUS BLD VENIPUNCTURE: CPT | Performed by: EMERGENCY MEDICINE

## 2025-02-03 PROCEDURE — 80048 BASIC METABOLIC PNL TOTAL CA: CPT | Performed by: EMERGENCY MEDICINE

## 2025-02-03 PROCEDURE — 93005 ELECTROCARDIOGRAM TRACING: CPT

## 2025-02-03 PROCEDURE — 86923 COMPATIBILITY TEST ELECTRIC: CPT | Performed by: EMERGENCY MEDICINE

## 2025-02-03 PROCEDURE — 82565 ASSAY OF CREATININE: CPT | Performed by: EMERGENCY MEDICINE

## 2025-02-03 PROCEDURE — 86850 RBC ANTIBODY SCREEN: CPT | Performed by: EMERGENCY MEDICINE

## 2025-02-03 PROCEDURE — 36430 TRANSFUSION BLD/BLD COMPNT: CPT

## 2025-02-03 PROCEDURE — 86900 BLOOD TYPING SEROLOGIC ABO: CPT | Performed by: EMERGENCY MEDICINE

## 2025-02-03 PROCEDURE — 85007 BL SMEAR W/DIFF WBC COUNT: CPT | Performed by: EMERGENCY MEDICINE

## 2025-02-03 PROCEDURE — 85014 HEMATOCRIT: CPT | Performed by: EMERGENCY MEDICINE

## 2025-02-03 PROCEDURE — 83735 ASSAY OF MAGNESIUM: CPT | Performed by: EMERGENCY MEDICINE

## 2025-02-03 RX ORDER — HEPARIN SODIUM (PORCINE) LOCK FLUSH IV SOLN 100 UNIT/ML 100 UNIT/ML
5 SOLUTION INTRAVENOUS ONCE
Status: COMPLETED | OUTPATIENT
Start: 2025-02-03 | End: 2025-02-03

## 2025-02-03 RX ORDER — HEPARIN SODIUM,PORCINE 10 UNIT/ML
5-10 VIAL (ML) INTRAVENOUS
Status: DISCONTINUED | OUTPATIENT
Start: 2025-02-03 | End: 2025-02-03

## 2025-02-03 RX ADMIN — HEPARIN 5 ML: 100 SYRINGE at 16:59

## 2025-02-03 ASSESSMENT — COLUMBIA-SUICIDE SEVERITY RATING SCALE - C-SSRS
6. HAVE YOU EVER DONE ANYTHING, STARTED TO DO ANYTHING, OR PREPARED TO DO ANYTHING TO END YOUR LIFE?: NO
2. HAVE YOU ACTUALLY HAD ANY THOUGHTS OF KILLING YOURSELF IN THE PAST MONTH?: NO
1. IN THE PAST MONTH, HAVE YOU WISHED YOU WERE DEAD OR WISHED YOU COULD GO TO SLEEP AND NOT WAKE UP?: NO

## 2025-02-03 ASSESSMENT — ACTIVITIES OF DAILY LIVING (ADL)
ADLS_ACUITY_SCORE: 60

## 2025-02-03 NOTE — ED TRIAGE NOTES
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 Received a call today, told her Hgb was low at 7.6. pt was told to go to the ED for a blood transfusion. Pt endorses chronic weakness/fatigue and some shortness of breath.     Triage Assessment (Adult)       Row Name 02/03/25 1051          Triage Assessment    Airway WDL WDL        Respiratory WDL    Respiratory WDL WDL        Skin Circulation/Temperature WDL    Skin Circulation/Temperature WDL WDL        Cardiac WDL    Cardiac WDL WDL        Peripheral/Neurovascular WDL    Peripheral Neurovascular WDL WDL        Cognitive/Neuro/Behavioral WDL    Cognitive/Neuro/Behavioral WDL WDL

## 2025-02-03 NOTE — ED NOTES
Telemetry technician called this writer, reported patient noted to have 16 beat run of v-tach. This writer was started blood transfusion during this run and was adjusting telemetry leads at the time, but MD notified. Patient denies feeling of palpitations, was A&OX4 during possible run of v-tach. Cardiac monitor remains attached to patient, call light within reach.

## 2025-02-03 NOTE — ED PROVIDER NOTES
Emergency Department Note      History of Present Illness     Chief Complaint   Abnormal Labs      HPI     Kayley Putnam is a 89 year old female with a history of atrial fibrillation, hypertension, hyperlipidemia, CAD, breast cancer, ALEXEI, and acute heart failure who presents to the ED for abnormal labs. The patient was recently hospitalized in January for anemia, and was suspected to have a GI bleed. She did have black/bloody stool at that time, but states she has had normal stool since her discharge. Patient was seen at [facility] for a routine blood draw last week as she has myelodysplastic syndrome, and received a call today stating her hemoglobin was low at 7.6. She was advised to be seen in the ED for a blood transfusion as there was no availability as an outpatient. Patient endorses fatigue/weakness for some time now as well as shortness of breath for several weeks. She adds that she had shortness of breath ever since her last hospitalization which has been unchanged.  She has no chest pain.Patient denies any vomiting or any bleeding.    Independent Historian   None    Review of External Notes   I reviewed discharge summary from 1/18/2025 which patient had acute on chronic anemia due to myelodysplastic syndrome and concern of GI bleeding with melena.    Past Medical History     Medical History and Problem List   Atrial fibrillation   Atherosclerosis of aorta  Hyperlipidemia  Hypertension  CAD (coronary artery disease)  Overweight and obesity  Anemia  Malignant neoplasm of right female breast  Myelodysplastic syndrome  Junctional bradycardia  ALEXEI (acute kidney injury)  Gastrointestinal hemorrhage with melena  Aortic valve disorder  CKD, stage 3b  DNR/I  Acute heart failure  Occlusion of carotid artery without cerebral infarction  Neutropenia  Perioral dermatitis  Severe sepsis  Leukocytopenia  Pancytopenia    Medications   Lasix  Lopressor  Tylenol  Amoxil  Lipitor  Femara  Reblozyl     Surgical History  "  Past Surgical History:   Procedure Laterality Date    APPENDECTOMY  1958    ENT SURGERY      tonsillectomy and adenoidectomy as a child    GYN SURGERY  1974    total hysterectomy     IR CHEST PORT PLACEMENT > 5 YRS OF AGE  5/24/2022    IRRIGATION AND DEBRIDEMENT BREAST Right 2/22/2020    Procedure: IRRIGATION AND DEBRIDEMENT, BREAST;  Surgeon: Avery Gloria MD;  Location: [facility]    IRRIGATION AND DEBRIDEMENT BREAST Right 2/22/2020    Procedure: IRRIGATION AND DEBRIDEMENT, BREAST;  Surgeon: Avery Gloria MD;  Location: [facility]    LUMPECTOMY BREAST, SEED LOCALIZATION, SENTINEL NODE Right 3/4/2020    Procedure: RIGHT BREAST SEED LOCALIZED LUMPECTOMY WITH RIGHT SENTINEL NODE BIOPSY;  Surgeon: Lali Pope MD;  Location: [facility] OR    ORTHOPEDIC SURGERY  2003, 2009    has had both knees replaced       Physical Exam     Patient Vitals for the past 24 hrs:   BP Temp Temp src Pulse Resp SpO2 Height Weight   02/03/25 1617 (!) 147/52 97.8  F (36.6  C) -- 63 23 96 % -- --   02/03/25 1615 (!) 147/52 -- -- 55 22 97 % -- --   02/03/25 1600 128/57 -- -- 69 19 96 % -- --   02/03/25 1545 136/58 -- -- 63 20 97 % -- --   02/03/25 1525 134/55 -- -- 56 21 99 % -- --   02/03/25 1510 131/58 -- -- 54 19 96 % -- --   02/03/25 1455 138/55 -- -- 54 20 98 % -- --   02/03/25 1440 135/56 -- -- 73 17 (!) 88 % -- --   02/03/25 1425 (!) 144/66 -- -- 57 21 95 % -- --   02/03/25 1421 (!) 144/66 97.6  F (36.4  C) Oral 54 22 96 % -- --   02/03/25 1417 (!) 143/72 97.7  F (36.5  C) Oral 61 18 96 % -- --   02/03/25 1412 (!) 142/63 98.2  F (36.8  C) Oral 58 20 94 % -- --   02/03/25 1229 (!) 142/64 -- -- 53 21 97 % -- --   02/03/25 1214 (!) 144/60 -- -- 52 22 97 % -- --   02/03/25 1051 114/67 97.9  F (36.6  C) Temporal 64 20 94 % 1.626 m (5' 4\") 61.2 kg (135 lb)     Physical Exam      HEENT:    Oropharynx is moist  Eyes:    Conjunctiva normal  Neck:     Supple, no meningismus.     CV:     Regular rate and rhythm.      Grade 4 holosystolic " murmur     No unilateral leg swelling.       2+ radial pulses bilateral.       No lower extremity edema.  PULM:    Clear to auscultation bilateral.       No respiratory distress.      Good air exchange.     No rales or wheezing.     No stridor.  ABD:    Soft, non-tender, non-distended.       No pulsatile masses.       No rebound, guarding or rigidity.  MSK:     No gross deformity to all four extremities.   LYMPH:   No cervical lymphadenopathy.  NEURO:   Alert. Good muscle tone, no atrophy.  Skin:    Warm, dry and intact.    Psych:    Mood is good and affect is appropriate.      Diagnostics     Lab Results   Labs Ordered and Resulted from Time of ED Arrival to Time of ED Departure   BASIC METABOLIC PANEL - Abnormal       Result Value    Sodium 133 (*)     Potassium 5.1      Chloride 99      Carbon Dioxide (CO2) 22      Anion Gap 12      Urea Nitrogen 47.1 (*)     Creatinine 1.42 (*)     GFR Estimate 35 (*)     Calcium 9.5      Glucose 84     CBC WITH PLATELETS AND DIFFERENTIAL - Abnormal    WBC Count 1.8 (*)     RBC Count 2.05 (*)     Hemoglobin 7.4 (*)     Hematocrit 22.8 (*)      (*)     MCH 36.1 (*)     MCHC 32.5      RDW 26.5 (*)     Platelet Count 22 (*)    MANUAL DIFFERENTIAL - Abnormal    % Neutrophils 47      % Lymphocytes 43      % Monocytes 2      % Eosinophils 0      % Basophils 0      % Metamyelocytes 1      % Myelocytes 2      % Promyelocytes 2      % Blasts 3      Absolute Neutrophils 0.8 (*)     Absolute Lymphocytes 0.8      Absolute Monocytes 0.0      Absolute Eosinophils 0.0      Absolute Basophils 0.0      Absolute Metamyelocytes 0.0      Absolute Myelocytes 0.0      Absolute Promyelocytes 0.0      Absolute Blasts 0.1 (*)     NRBCs per 100 WBC 13      Absolute NRBCs 0.2      RBC Morphology Confirmed RBC Indices      Platelet Assessment   (*)     Value: Automated Count Confirmed. Giant platelets are present.    Elliptocytes Marked (*)     RBC Fragments Slight (*)    MAGNESIUM - Normal     Magnesium 1.8     TYPE AND SCREEN, ADULT    ABO/RH(D) A POS      Antibody Screen Negative      SPECIMEN EXPIRATION DATE 20250206235900     PREPARE RED BLOOD CELLS (UNIT)    Blood Component Type Red Blood Cells      Product Code T0437U91      Unit Status Transfused      Unit Number [id number]      CROSSMATCH Compatible      CODING SYSTEM [id number]      ISSUE DATE AND TIME 23234606342329      UNIT ABO/RH A+      UNIT TYPE ISBT 6200     PREPARE RED BLOOD CELLS (UNIT)   TRANSFUSE RED BLOOD CELLS (UNIT)   ABO/RH TYPE AND SCREEN       Imaging   No orders to display       EKG   ECG taken at 1140, ECG read at 1147  Atrial fibrillation with slow ventricular response  Left anterior fascicular block  Left ventricular hypertrophy (Sokolow-Phillips, Axel product, Romhilt-Shaw)  Abnormal ECG   Rate 54 bpm. AL interval * ms. QRS duration 92 ms. QT/QTc 452/428 ms. P-R-T axes * -45 60.    Independent Interpretation   None    ED Course      Medications Administered   Medications   heparin lock flush 100 unit/mL injection 5 mL (5 mLs Intracatheter $Given 2/3/25 1659)       Procedures   Procedures     Discussion of Management   None    ED Course   ED Course as of 02/03/25 1727   Mon Feb 03, 2025   1126 I obtained history and examined the patient as noted above.     1249 I spoke with Dr. Norton of hematology regarding possible blood transfusion for the patient.   1319 I rechecked the patient.       Additional Documentation  None    Medical Decision Making / Diagnosis     CMS Diagnoses: None    MIPS       None    MDM     Kayley Putnam is a 89 year old female with a history of mild dysplastic syndrome presents with ongoing anemia with recommendations of transfusion by hematologist.  Patient did have recent admission for questionable GI bleed but she has no historical features to suggest recurrent GI bleed.  Hemoglobin is 7.4.  I spoke with her primary hematologist Dr. Norton who requested single unit of packed red blood cells.  This was  provided without event.  She also has leukopenia and thrombocytopenia secondary to her myelodysplastic syndrome but has no bleeding that would warrant platelet transfusion.  She is safe for discharge home with close follow-up with her primary hematologist and return to ED for any worsening symptoms    Disposition   The patient was discharged.     Diagnosis     ICD-10-CM    1. Chronic anemia  D64.9       2. Myelodysplastic syndrome (H)  D46.9            Discharge Medications   New Prescriptions    No medications on file         Scribe Disclosure:  I, Kendal Stewart, am serving as a scribe at 1:48 PM on 2/3/2025 to document services personally performed by Cruz Fernando MD based on my observations and the provider's statements to me.        Cruz Fernando MD  02/03/25 4140

## 2025-02-28 ENCOUNTER — HOSPITAL ENCOUNTER (INPATIENT)
Facility: CLINIC | Age: OVER 89
DRG: 377 | End: 2025-02-28
Attending: EMERGENCY MEDICINE
Payer: MEDICARE

## 2025-02-28 DIAGNOSIS — D46.9 MYELODYSPLASTIC SYNDROME (H): ICD-10-CM

## 2025-02-28 DIAGNOSIS — I48.19 PERSISTENT ATRIAL FIBRILLATION (H): ICD-10-CM

## 2025-02-28 DIAGNOSIS — D69.6 THROMBOCYTOPENIA: ICD-10-CM

## 2025-02-28 DIAGNOSIS — D62 ANEMIA DUE TO BLOOD LOSS, ACUTE: ICD-10-CM

## 2025-02-28 DIAGNOSIS — K92.2 LOWER GI BLEED: ICD-10-CM

## 2025-02-28 LAB
ABO + RH BLD: NORMAL
ANION GAP SERPL CALCULATED.3IONS-SCNC: 12 MMOL/L (ref 7–15)
APTT PPP: 34 SECONDS (ref 22–38)
BASOPHILS # BLD MANUAL: 0 10E3/UL (ref 0–0.2)
BASOPHILS NFR BLD MANUAL: 0 %
BLD GP AB SCN SERPL QL: NEGATIVE
BLD PROD TYP BPU: NORMAL
BLD PROD TYP BPU: NORMAL
BLOOD COMPONENT TYPE: NORMAL
BLOOD COMPONENT TYPE: NORMAL
BUN SERPL-MCNC: 56.8 MG/DL (ref 8–23)
CALCIUM SERPL-MCNC: 9.3 MG/DL (ref 8.8–10.4)
CHLORIDE SERPL-SCNC: 99 MMOL/L (ref 98–107)
CODING SYSTEM: NORMAL
CODING SYSTEM: NORMAL
CREAT SERPL-MCNC: 1.29 MG/DL (ref 0.51–0.95)
CROSSMATCH: NORMAL
EGFRCR SERPLBLD CKD-EPI 2021: 39 ML/MIN/1.73M2
ELLIPTOCYTES BLD QL SMEAR: SLIGHT
EOSINOPHIL # BLD MANUAL: 0 10E3/UL (ref 0–0.7)
EOSINOPHIL NFR BLD MANUAL: 0 %
ERYTHROCYTE [DISTWIDTH] IN BLOOD BY AUTOMATED COUNT: ABNORMAL %
GIANT PLATELETS BLD QL SMEAR: SLIGHT
GLUCOSE SERPL-MCNC: 112 MG/DL (ref 70–99)
HCO3 SERPL-SCNC: 21 MMOL/L (ref 22–29)
HCT VFR BLD AUTO: 20.4 % (ref 35–47)
HGB BLD-MCNC: 6.7 G/DL (ref 11.7–15.7)
HGB BLD-MCNC: 7.4 G/DL (ref 11.7–15.7)
HOLD SPECIMEN: NORMAL
INR PPP: 1.16 (ref 0.85–1.15)
ISSUE DATE AND TIME: NORMAL
ISSUE DATE AND TIME: NORMAL
LYMPHOCYTES # BLD MANUAL: 0.6 10E3/UL (ref 0.8–5.3)
LYMPHOCYTES NFR BLD MANUAL: 37 %
MCH RBC QN AUTO: 36.2 PG (ref 26.5–33)
MCHC RBC AUTO-ENTMCNC: 32.8 G/DL (ref 31.5–36.5)
MCV RBC AUTO: 110 FL (ref 78–100)
MONOCYTES # BLD MANUAL: 0 10E3/UL (ref 0–1.3)
MONOCYTES NFR BLD MANUAL: 3 %
NEUTROPHILS # BLD MANUAL: 1 10E3/UL (ref 1.6–8.3)
NEUTROPHILS NFR BLD MANUAL: 60 %
NRBC # BLD AUTO: 0.1 10E3/UL
NRBC BLD MANUAL-RTO: 4 %
PLAT MORPH BLD: ABNORMAL
PLATELET # BLD AUTO: 30 10E3/UL (ref 150–450)
POLYCHROMASIA BLD QL SMEAR: SLIGHT
POTASSIUM SERPL-SCNC: 4.7 MMOL/L (ref 3.4–5.3)
RBC # BLD AUTO: 1.85 10E6/UL (ref 3.8–5.2)
RBC MORPH BLD: ABNORMAL
SODIUM SERPL-SCNC: 132 MMOL/L (ref 135–145)
SPECIMEN EXP DATE BLD: NORMAL
UNIT ABO/RH: NORMAL
UNIT ABO/RH: NORMAL
UNIT NUMBER: NORMAL
UNIT NUMBER: NORMAL
UNIT STATUS: NORMAL
UNIT STATUS: NORMAL
UNIT TYPE ISBT: 6200
UNIT TYPE ISBT: 6200
WBC # BLD AUTO: 1.6 10E3/UL (ref 4–11)

## 2025-02-28 PROCEDURE — 120N000001 HC R&B MED SURG/OB

## 2025-02-28 PROCEDURE — 36415 COLL VENOUS BLD VENIPUNCTURE: CPT | Performed by: EMERGENCY MEDICINE

## 2025-02-28 PROCEDURE — 85007 BL SMEAR W/DIFF WBC COUNT: CPT | Performed by: EMERGENCY MEDICINE

## 2025-02-28 PROCEDURE — 85610 PROTHROMBIN TIME: CPT | Performed by: EMERGENCY MEDICINE

## 2025-02-28 PROCEDURE — 82310 ASSAY OF CALCIUM: CPT | Performed by: EMERGENCY MEDICINE

## 2025-02-28 PROCEDURE — 85730 THROMBOPLASTIN TIME PARTIAL: CPT | Performed by: EMERGENCY MEDICINE

## 2025-02-28 PROCEDURE — 250N000011 HC RX IP 250 OP 636: Performed by: INTERNAL MEDICINE

## 2025-02-28 PROCEDURE — 258N000003 HC RX IP 258 OP 636: Performed by: STUDENT IN AN ORGANIZED HEALTH CARE EDUCATION/TRAINING PROGRAM

## 2025-02-28 PROCEDURE — 86923 COMPATIBILITY TEST ELECTRIC: CPT | Performed by: EMERGENCY MEDICINE

## 2025-02-28 PROCEDURE — P9016 RBC LEUKOCYTES REDUCED: HCPCS | Performed by: EMERGENCY MEDICINE

## 2025-02-28 PROCEDURE — 99207 PR NO CHARGE LOS: CPT | Performed by: INTERNAL MEDICINE

## 2025-02-28 PROCEDURE — 250N000013 HC RX MED GY IP 250 OP 250 PS 637: Performed by: STUDENT IN AN ORGANIZED HEALTH CARE EDUCATION/TRAINING PROGRAM

## 2025-02-28 PROCEDURE — 86850 RBC ANTIBODY SCREEN: CPT | Performed by: EMERGENCY MEDICINE

## 2025-02-28 PROCEDURE — 85018 HEMOGLOBIN: CPT | Performed by: INTERNAL MEDICINE

## 2025-02-28 PROCEDURE — 85027 COMPLETE CBC AUTOMATED: CPT | Performed by: EMERGENCY MEDICINE

## 2025-02-28 PROCEDURE — P9037 PLATE PHERES LEUKOREDU IRRAD: HCPCS | Performed by: EMERGENCY MEDICINE

## 2025-02-28 PROCEDURE — 99291 CRITICAL CARE FIRST HOUR: CPT | Mod: 25

## 2025-02-28 PROCEDURE — 99222 1ST HOSP IP/OBS MODERATE 55: CPT | Mod: AI | Performed by: STUDENT IN AN ORGANIZED HEALTH CARE EDUCATION/TRAINING PROGRAM

## 2025-02-28 PROCEDURE — 86900 BLOOD TYPING SEROLOGIC ABO: CPT | Performed by: EMERGENCY MEDICINE

## 2025-02-28 PROCEDURE — 80048 BASIC METABOLIC PNL TOTAL CA: CPT | Performed by: EMERGENCY MEDICINE

## 2025-02-28 PROCEDURE — 86923 COMPATIBILITY TEST ELECTRIC: CPT | Performed by: INTERNAL MEDICINE

## 2025-02-28 RX ORDER — HEPARIN SODIUM,PORCINE 10 UNIT/ML
5-10 VIAL (ML) INTRAVENOUS EVERY 24 HOURS
Status: DISCONTINUED | OUTPATIENT
Start: 2025-02-28 | End: 2025-03-07 | Stop reason: HOSPADM

## 2025-02-28 RX ORDER — HEPARIN SODIUM (PORCINE) LOCK FLUSH IV SOLN 100 UNIT/ML 100 UNIT/ML
5-10 SOLUTION INTRAVENOUS
Status: DISCONTINUED | OUTPATIENT
Start: 2025-02-28 | End: 2025-03-07 | Stop reason: HOSPADM

## 2025-02-28 RX ORDER — AMOXICILLIN 250 MG
2 CAPSULE ORAL 2 TIMES DAILY PRN
Status: DISCONTINUED | OUTPATIENT
Start: 2025-02-28 | End: 2025-03-07 | Stop reason: HOSPADM

## 2025-02-28 RX ORDER — AMOXICILLIN 250 MG
1 CAPSULE ORAL 2 TIMES DAILY PRN
Status: DISCONTINUED | OUTPATIENT
Start: 2025-02-28 | End: 2025-03-07 | Stop reason: HOSPADM

## 2025-02-28 RX ORDER — HEPARIN SODIUM,PORCINE 10 UNIT/ML
5-10 VIAL (ML) INTRAVENOUS
Status: DISCONTINUED | OUTPATIENT
Start: 2025-02-28 | End: 2025-03-07 | Stop reason: HOSPADM

## 2025-02-28 RX ORDER — CALCIUM CARBONATE 500 MG/1
1000 TABLET, CHEWABLE ORAL 4 TIMES DAILY PRN
Status: DISCONTINUED | OUTPATIENT
Start: 2025-02-28 | End: 2025-03-07 | Stop reason: HOSPADM

## 2025-02-28 RX ORDER — SPIRONOLACTONE 25 MG/1
25 TABLET ORAL DAILY
COMMUNITY
Start: 2025-02-26

## 2025-02-28 RX ORDER — SODIUM CHLORIDE 9 MG/ML
INJECTION, SOLUTION INTRAVENOUS CONTINUOUS
Status: DISCONTINUED | OUTPATIENT
Start: 2025-02-28 | End: 2025-02-28 | Stop reason: DRUGHIGH

## 2025-02-28 RX ORDER — ACETAMINOPHEN 500 MG
500 TABLET ORAL AT BEDTIME
COMMUNITY

## 2025-02-28 RX ORDER — FUROSEMIDE 20 MG/1
20 TABLET ORAL DAILY
COMMUNITY

## 2025-02-28 RX ORDER — LIDOCAINE 40 MG/G
CREAM TOPICAL
Status: DISCONTINUED | OUTPATIENT
Start: 2025-02-28 | End: 2025-03-07 | Stop reason: HOSPADM

## 2025-02-28 RX ADMIN — METOPROLOL TARTRATE 6.25 MG: 25 TABLET, FILM COATED ORAL at 11:53

## 2025-02-28 RX ADMIN — Medication 5 ML: at 11:05

## 2025-02-28 RX ADMIN — SODIUM CHLORIDE: 0.9 INJECTION, SOLUTION INTRAVENOUS at 06:56

## 2025-02-28 RX ADMIN — Medication 5 ML: at 12:32

## 2025-02-28 RX ADMIN — METOPROLOL TARTRATE 6.25 MG: 25 TABLET, FILM COATED ORAL at 19:27

## 2025-02-28 ASSESSMENT — ACTIVITIES OF DAILY LIVING (ADL)
DEPENDENT_IADLS:: TRANSPORTATION
ADLS_ACUITY_SCORE: 42
ADLS_ACUITY_SCORE: 60
ADLS_ACUITY_SCORE: 42
ADLS_ACUITY_SCORE: 42
ADLS_ACUITY_SCORE: 62
ADLS_ACUITY_SCORE: 62
ADLS_ACUITY_SCORE: 42
ADLS_ACUITY_SCORE: 62
ADLS_ACUITY_SCORE: 42
ADLS_ACUITY_SCORE: 42
ADLS_ACUITY_SCORE: 60
ADLS_ACUITY_SCORE: 42
ADLS_ACUITY_SCORE: 60
ADLS_ACUITY_SCORE: 42
ADLS_ACUITY_SCORE: 62

## 2025-02-28 NOTE — ED NOTES
[facility]  ED Nurse Handoff Report    ED Chief complaint: Rectal Bleeding  . ED Diagnosis:   Final diagnoses:   Lower GI bleed   Anemia due to blood loss, acute on chronic   Myelodysplastic syndrome (H)   Thrombocytopenia       Allergies:   Allergies   Allergen Reactions    Amlodipine Dizziness    Codeine Nausea    Flecainide Dizziness    Hydrochlorothiazide Other (See Comments)     Other reaction(s): Hyponatremia    Meperidine Nausea and Vomiting    Promethazine      Jittery    Ceftin [Cefuroxime] Itching and Rash    Tetracycline Rash    Vancomycin Itching and Rash       Code Status: DNR / DNI    Activity level - Baseline/Home:  independent.  Activity Level - Current:   assist of 1.   Lift room needed: No.   Bariatric: No   Needed: No   Isolation: No.   Infection: Not Applicable.     Respiratory status: Room air    Vital Signs (within 30 minutes):   Vitals:    02/28/25 0348 02/28/25 0419 02/28/25 0457 02/28/25 0513   BP: 122/45 127/48 121/50 116/43   BP Location: Left arm      Patient Position: Semi-Navarro's      Cuff Size: Adult Regular      Pulse: 72 72 71 79   Resp: 16 16 20 22   Temp: 98.4  F (36.9  C) 98.5  F (36.9  C) 98.1  F (36.7  C) 97.9  F (36.6  C)   TempSrc: Oral Oral  Oral   SpO2: 92% 92%  94%       Cardiac Rhythm:  ,      Pain level:    Patient confused: No.   Patient Falls Risk: patient and family education.   Elimination Status: Has voided     Patient Report - Initial Complaint: Rectal bleeding.   Focused Assessment: Chief Complaint   Rectal Bleeding     SONA Putnam is a 89 year old female with a history of myelodysplastic syndrome, CAD, CHF, breast cancer, hypertension and hyperlipidemia presenting with rectal bleeding. The patient notes having cramping sensations throughout the day. She went to the bathroom around 2030 and noticed bright red blood in her stool associated with lightheadedness afterward. The patient feels well laying down with no recent  fever or vomiting. Of note, Kayley reports her hemoglobin as 8.1 at the clinic about a week ago.     Abnormal Results:   Labs Ordered and Resulted from Time of ED Arrival to Time of ED Departure   INR - Abnormal       Result Value    INR 1.16 (*)    BASIC METABOLIC PANEL - Abnormal    Sodium 132 (*)     Potassium 4.7      Chloride 99      Carbon Dioxide (CO2) 21 (*)     Anion Gap 12      Urea Nitrogen 56.8 (*)     Creatinine 1.29 (*)     GFR Estimate 39 (*)     Calcium 9.3      Glucose 112 (*)    CBC WITH PLATELETS AND DIFFERENTIAL - Abnormal    WBC Count 1.6 (*)     RBC Count 1.85 (*)     Hemoglobin 6.7 (*)     Hematocrit 20.4 (*)      (*)     MCH 36.2 (*)     MCHC 32.8      RDW        Platelet Count 30 (*)    MANUAL DIFFERENTIAL - Abnormal    % Neutrophils 60      % Lymphocytes 37      % Monocytes 3      % Eosinophils 0      % Basophils 0      Absolute Neutrophils 1.0 (*)     Absolute Lymphocytes 0.6 (*)     Absolute Monocytes 0.0      Absolute Eosinophils 0.0      Absolute Basophils 0.0      NRBCs per 100 WBC 4      Absolute NRBCs 0.1      RBC Morphology Confirmed RBC Indices      Platelet Assessment   (*)     Value: Automated Count Confirmed. Giant platelets are present.    Giant Platelets Slight (*)     Elliptocytes Slight (*)     Polychromasia Slight (*)    PARTIAL THROMBOPLASTIN TIME - Normal    aPTT 34     TYPE AND SCREEN, ADULT    ABO/RH(D) A POS      Antibody Screen Negative      SPECIMEN EXPIRATION DATE [id number]     PREPARE RED BLOOD CELLS (UNIT)    Blood Component Type Red Blood Cells      Product Code X8767O45      Unit Status Ready for issue      Unit Number [id number]      CROSSMATCH Compatible      CODING SYSTEM [id number]     PREPARE PHERESED PLATELETS (UNIT)    Blood Component Type Platelets      Product Code H1353W54      Unit Status Transfused      Unit Number [id number]      CODING SYSTEM [id number]      ISSUE DATE AND TIME [id number]      UNIT ABO/RH A+      UNIT TYPE ISBT 6200      PREPARE RED BLOOD CELLS (UNIT)   PREPARE PHERESED PLATELETS (UNIT)   TRANSFUSE PHERESED PLATELETS (UNIT)   ABO/RH TYPE AND SCREEN        No orders to display       Treatments provided: See MAR. Frequent monitoring of pt.  Family Comments: N/a  OBS brochure/video discussed/provided to patient:  No  ED Medications:   Medications   lidocaine 1 % 0.1-1 mL (has no administration in time range)   lidocaine (LMX4) cream (has no administration in time range)   sodium chloride (PF) 0.9% PF flush 3 mL (has no administration in time range)   sodium chloride (PF) 0.9% PF flush 3 mL (has no administration in time range)   senna-docusate (SENOKOT-S/PERICOLACE) 8.6-50 MG per tablet 1 tablet (has no administration in time range)     Or   senna-docusate (SENOKOT-S/PERICOLACE) 8.6-50 MG per tablet 2 tablet (has no administration in time range)   calcium carbonate (TUMS) chewable tablet 1,000 mg (has no administration in time range)   sodium chloride 0.9 % infusion (has no administration in time range)   metoprolol tartrate (LOPRESSOR) quarter-tab 6.25 mg (has no administration in time range)       Drips infusing:  No  For the majority of the shift this patient was Green.   Interventions performed were N/a.    Sepsis treatment initiated: No    Cares/treatment/interventions/medications to be completed following ED care: N/a    ED Nurse Name: Mireya Juarez RN  6:04 AM      .RECEIVING UNIT ED HANDOFF REVIEW    Above ED Nurse Handoff Report was reviewed: Yes  Reviewed by: Tommy Pacheco RN on February 28, 2025 at 6:38 AM

## 2025-02-28 NOTE — PROGRESS NOTES
89 yo female with MDS re-admitted earlier this am with rectal bleeding and anemia, thrombocytopenia. H&P and admission orders reviewed.  PRBC transfusion currently running; platelets ordered.  No further rectal bleeding since admission.  NPO until GI sees (last say during admission 1/2025 and did not recommend endoscopy).

## 2025-02-28 NOTE — PHARMACY-ADMISSION MEDICATION HISTORY
Pharmacy Intern Admission Medication History    Admission medication history is complete. The information provided in this note is only as accurate as the sources available at the time of the update.    Information Source(s): Patient, Hospital records, and CareEverywhere/SureScripts via in-person    Pertinent Information: Confirmed patient is taking metoprolol tartrate, not metoprolol succinate.     Changes made to PTA medication list:  Added: Spironolactone, Tylenol   Deleted: None  Changed: Furosemide 40mmg daily-> furosemide 20mg tablet     Allergies reviewed with patient and updates made in EHR: no    Medication History Completed By: Arely Lazo RPH 2025 11:00 AM    PTA Med List   Medication Sig Last Dose/Taking    acetaminophen (TYLENOL) 500 MG tablet Take 500 mg by mouth at bedtime. Unknown Bedtime    atorvastatin (LIPITOR) 20 MG tablet Take 20 mg by mouth At Bedtime  Past Week Evening    furosemide (LASIX) 20 MG tablet Take 20 mg by mouth daily. 2025 Morning    letrozole (FEMARA) 2.5 MG tablet Take 1 tablet by mouth daily. Past Week Morning    luspatercept-aamt (REBLOZYL) 25 MG injection Inject 1 mg/kg subcutaneously every 21 days. 2025    metoprolol tartrate (LOPRESSOR) 25 MG tablet Take 0.25 tablets (6.25 mg) by mouth 2 times daily. Past Week    spironolactone (ALDACTONE) 25 MG tablet Take 25 mg by mouth daily. Past Week Morning

## 2025-02-28 NOTE — CONSULTS
GASTROENTEROLOGY CONSULTATION      Kayley Putnam  [address] [address]   [facility] [id number]  89 year old female     Admission Date/Time: 2/28/2025  Primary Care Provider: Taj Gordillo     We were asked to see the patient in consultation by Dr. Gordon for evaluation of bright red blood per rectum.    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    Patient reports that she struggles intermittently with constipation.  She takes stool softeners as needed.  Over the last several days she has had straining with bowel movements and felt constipated.  She took a stool softener yesterday.  She reports that stools have been soft.  Yesterday, she had 2 large stools with bright red blood, and then another smaller stool with bright red blood.  This was all prior to coming to the hospital.  She has not had further stools since that time.  Following these episodes of bleeding, she felt dizzy and faint but did not have any syncopal episodes.  Denies any abdominal pain, nausea, vomiting, melena.    Patient reports that she gets periodic transfusions due to chronic anemia from her MDS.  Usually she is not transfused by her report unless hemoglobin is less than 7.4.  She denies NSAID use.  She is does not take any aspirin products.  She is not on any blood thinners or anticoagulation.  She has chronic pancytopenia secondary to MDS.  I note that she was admitted to the hospital in January at which time she had reported melena.  Our service was consulted and after discussion with the patient EGD was deferred given her age, other medical comorbidities, and stable hemodynamics/hemoglobin.    Labs on  presentation today showed a hemoglobin 6.7, previously 7.4 on February 3, white blood cell count 1.6, platelets 30,000 (previously 22,000 on February 30), , CMP with sodium 132, BUN 56.8, creatinine 1.29, GFR 39, otherwise unremarkable, INR 1.16.    No imaging.    Last colonoscopy in 2006 for screening with normal findings.     PAST MEDICAL HISTORY:  Patient Active Problem List    Diagnosis Date Noted    Thrombocytopenia 02/28/2025     Priority: Medium    Lower GI bleed 02/28/2025     Priority: Medium    Myelodysplastic syndrome (H) 02/28/2025     Priority: Medium    Anemia due to blood loss, acute 02/28/2025     Priority: Medium    Bradycardia 01/14/2025     Priority: Medium    Junctional bradycardia 01/14/2025     Priority: Medium    ALEXEI (acute kidney injury) 01/14/2025     Priority: Medium    Gastrointestinal hemorrhage with melena 01/14/2025     Priority: Medium    Anemia, unspecified type 01/14/2025     Priority: Medium    MDS (myelodysplastic syndrome) (H) 07/24/2023     Priority: Medium    Breast hematoma 02/24/2020     Priority: Medium    Hematoma of breast 02/21/2020     Priority: Medium    Malignant neoplasm of right female breast, unspecified estrogen receptor status, unspecified site of breast (H) 02/04/2020     Priority: Medium     Added automatically from request for surgery [id number]      Anemia 04/25/2018     Priority: Medium    Diarrhea 03/15/2018     Priority: Medium    AF (atrial fibrillation) (H)      Priority: Medium    Atherosclerosis of aorta      Priority: Medium    Hyperlipidemia      Priority: Medium    HTN (hypertension)      Priority: Medium    CAD (coronary artery disease)      Priority: Medium    Overweight and obesity(278.0)      Priority: Medium          ROS: A comprehensive ten point review of systems was negative aside from those in mentioned in the HPI.       MEDICATIONS:   Prior to Admission medications    Medication Sig Start Date End Date Taking? Authorizing Provider    atorvastatin (LIPITOR) 20 MG tablet Take 20 mg by mouth At Bedtime     Reported, Patient   furosemide (LASIX) 40 MG tablet Take 0.5 tablets (20 mg) by mouth daily. 1/18/25   Ori Albright MD   letrozole (FEMARA) 2.5 MG tablet Take 1 tablet by mouth daily. 11/13/24   Unknown, Entered By History   luspatercept-aamt (REBLOZYL) 25 MG injection Inject 1 mg/kg subcutaneously every 21 days.    Unknown, Entered By History   metoprolol tartrate (LOPRESSOR) 25 MG tablet Take 0.25 tablets (6.25 mg) by mouth 2 times daily. 1/18/25 2/17/25  Ori Albright MD        ALLERGIES:   Allergies   Allergen Reactions    Amlodipine Dizziness    Codeine Nausea    Flecainide Dizziness    Hydrochlorothiazide Other (See Comments)     Other reaction(s): Hyponatremia    Meperidine Nausea and Vomiting    Promethazine      Jittery    Ceftin [Cefuroxime] Itching and Rash    Tetracycline Rash    Vancomycin Itching and Rash        SOCIAL HISTORY:  Social History     Tobacco Use    Smoking status: Never    Smokeless tobacco: Never   Substance Use Topics    Alcohol use: Yes     Alcohol/week: 0.0 standard drinks of alcohol     Comment: occasional - wine    Drug use: Never        FAMILY HISTORY:  Family History   Problem Relation Age of Onset    Cerebrovascular Disease Father         PHYSICAL EXAM:   /62 (BP Location: Left arm)   Pulse 72   Temp 98.1  F (36.7  C) (Oral)   Resp 16   SpO2 92%      PHYSICAL EXAM:  General: alert, oriented, NAD  SKIN: no suspicious lesions, rashes, jaundice, or spider angiomas  HEAD: Normocephalic. No masses, lesions, tenderness or abnormalities  NECK: Neck supple. No adenopathy. Thyroid symmetric, normal size.  EYES: No scleral icterus  ENT: ENT exam normal, no neck nodes or sinus tenderness  RESPIRATORY: negative, Good diaphragmatic excursion. Lungs clear  CARDIOVASCULAR: negative, PMI normal. No lifts, heaves, or thrills. RRR. No murmurs, clicks gallops or rub  GASTROINTESTINAL: +BS, soft, NT, ND, no HSM, no  masses/guarding/rebound  JOINT/EXTREMITIES: extremities normal- no gross deformities noted, gait normal and normal muscle tone  NEURO: Reflexes grossly normal and symmetric. Sensation grossly WNL.  PSYCH: no abnormal anxiety/depression  LYMPH: No anterior cervical, posterior cervical, or supraclavicular adenopathy     LABS:  I reviewed the patient's new clinical lab test results.   Recent Labs   Lab Test 02/28/25  0143 02/03/25  1155 01/18/25  1008 01/15/25  0800 01/14/25  1555 11/22/24  1136   WBC 1.6* 1.8* 1.4*   < > 1.8* 1.3*   HGB 6.7* 7.4* 8.1*   < > 5.2* 7.6*   * 111* 103*   < > 115* 118*   PLT 30* 22* 21*   < > 21* 26*   INR 1.16*  --   --   --  1.23* 1.14    < > = values in this interval not displayed.     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    1. Bright red blood per rectum. History and presentation most consistent with outlet bleeding in the setting of constipation/straining vs diverticular. Cannot rule out malignancy vs colonic AVMs. Less likely IBD, ischemic colitis. HGB 6.7 on admit. Getting 1 unit PRBCs now. Clinical signs of bleeding resolved yesterday prior to presentation to the hospital. Hemodynamically stable. Last colonoscopy in 2006 with normal findings. She is not on any anticoagulation.     2. Pancytopenia. Follows with Dr. Norton at [facility]. Received  platelet transfusion here although platelets not significantly below baseline. Reports she receives periodic blood transfusions for anemia.     Plan:  -Monitor for recurrent bleeding.   -Monitor response to blood transfusion. Repeat transfusion as needed.   -No urgent need for colonoscopy at this time. Would reconsider if bleeding recurs/HGB continues to decline.   -Clear liquids for now.     Discussed with Dr. Wilcox.     Total time spent:  50 minutes was spent providing patient care, including patient evaluation, reviewing documentation/test results, and . Thank you for asking us to participate in the care of this patient.      Winter Delgado, PAC  [facility] ([facility])    ---------------  This was a shared visit.  I came by to visit the patient this afternoon.  No further bleeding, last episode yesterday.  She wanted to know what else to take for constipation, we discussed miralax 1 capful per day and she can titrate it to a smaller dose if her stools become too lose,  She is not interested in colonoscopy right now, we discussed bleeding could be from hemorrhoid, diverticular, colon cancer.  If she had colon cancer then she would likely need surgery +/- chemo, she would not proceed with any of this if that were to be the case.      Please call us back if she develops significant bleeding and we could readdress need for procedure(s).  Signing off but please call with questions or concerns.    I spent about 25 minutes in the care of this patient.      Leti Wilcox MD

## 2025-02-28 NOTE — PLAN OF CARE
"Goal Outcome Evaluation:      Plan of Care Reviewed With: patient    Overall Patient Progress: no changeOverall Patient Progress: no change    Outcome Evaluation: Received 1 unit PRBC's and ariana well. post tx hgb 7.4. Clear liquid, GI following, will monitor for bleeding, port patent locked      Problem: Adult Inpatient Plan of Care  Goal: Plan of Care Review  Description: The Plan of Care Review/Shift note should be completed every shift.  The Outcome Evaluation is a brief statement about your assessment that the patient is improving, declining, or no change.  This information will be displayed automatically on your shift  note.  Outcome: Progressing  Flowsheets (Taken 2/28/2025 1253)  Outcome Evaluation: Received 1 unit PRBC's and ariana well. post tx hgb 7.4. Clear liquid, GI following, will monitor for bleeding, port patent locked  Plan of Care Reviewed With: patient  Overall Patient Progress: no change  Goal: Patient-Specific Goal (Individualized)  Description: You can add care plan individualizations to a care plan. Examples of Individualization might be:  \"Parent requests to be called daily at 9am for status\", \"I have a hard time hearing out of my right ear\", or \"Do not touch me to wake me up as it startles  me\".  Outcome: Progressing  Goal: Absence of Hospital-Acquired Illness or Injury  Outcome: Progressing  Intervention: Identify and Manage Fall Risk  Recent Flowsheet Documentation  Taken 2/28/2025 1059 by Hilda Kennedy RN  Safety Promotion/Fall Prevention:   activity supervised   assistive device/personal items within reach   increased rounding and observation   increase visualization of patient   nonskid shoes/slippers when out of bed   room door open   room near nurse's station   safety round/check completed   supervised activity  Intervention: Prevent Skin Injury  Recent Flowsheet Documentation  Taken 2/28/2025 1059 by Hilda eKnnedy RN  Body Position:   position changed independently   " supine, head elevated  Intervention: Prevent Infection  Recent Flowsheet Documentation  Taken 2/28/2025 1059 by Hilda Kennedy RN  Infection Prevention:   single patient room provided   rest/sleep promoted   hand hygiene promoted  Goal: Optimal Comfort and Wellbeing  Outcome: Progressing  Goal: Readiness for Transition of Care  Outcome: Progressing  Intervention: Mutually Develop Transition Plan  Recent Flowsheet Documentation  Taken 2/28/2025 0800 by Hilda Kennedy RN  Equipment Currently Used at Home: shower chair     Problem: Skin Injury Risk Increased  Goal: Skin Health and Integrity  Outcome: Progressing  Intervention: Plan: Nurse Driven Intervention: Moisture Management  Recent Flowsheet Documentation  Taken 2/28/2025 1059 by Hilda Kennedy RN  Moisture Interventions: Urinary collection device  Intervention: Optimize Skin Protection  Recent Flowsheet Documentation  Taken 2/28/2025 1059 by Hilda Kennedy RN  Activity Management:   up to bedside commode   back to bed  Head of Bed (HOB) Positioning: HOB at 20-30 degrees

## 2025-02-28 NOTE — CONSULTS
Care Management Initial Consult    General Information  Assessment completed with: Patient,    Type of CM/SW Visit: Initial Assessment    Primary Care Provider verified and updated as needed: Yes   Readmission within the last 30 days: no previous admission in last 30 days      Reason for Consult: discharge planning  Advance Care Planning: Advance Care Planning Reviewed: present on chart          Communication Assessment  Patient's communication style: spoken language (English or Bilingual)    Hearing Difficulty or Deaf: yes   Wear Glasses or Blind: yes    Cognitive  Cognitive/Neuro/Behavioral: WDL                      Living Environment:   People in home: alone     Current living Arrangements: apartment, independent living facility      Able to return to prior arrangements: yes       Family/Social Support:  Care provided by: self  Provides care for: no one     Support system:            Description of Support System:           Current Resources:   Patient receiving home care services: No        Community Resources: None  Equipment currently used at home: shower chair  Supplies currently used at home:      Employment/Financial:  Employment Status:          Financial Concerns:             Does the patient's insurance plan have a 3 day qualifying hospital stay waiver?  No    Lifestyle & Psychosocial Needs:  Social Drivers of Health     Food Insecurity: Low Risk  (2/28/2025)    Food Insecurity     Within the past 12 months, did you worry that your food would run out before you got money to buy more?: No     Within the past 12 months, did the food you bought just not last and you didn t have money to get more?: No   Depression: Not at risk (5/29/2024)    Received from [employer]    PHQ-2     PHQ-2 Score: 2   Housing Stability: Low Risk  (2/28/2025)    Housing Stability     Do you have housing? : Yes     Are you worried about losing your housing?: No   Tobacco Use: Low Risk  (11/22/2024)    Patient History     Smoking  Tobacco Use: Never     Smokeless Tobacco Use: Never     Passive Exposure: Not on file   Financial Resource Strain: Low Risk  (2/28/2025)    Financial Resource Strain     Within the past 12 months, have you or your family members you live with been unable to get utilities (heat, electricity) when it was really needed?: No   Alcohol Use: Not on file   Transportation Needs: Low Risk  (2/28/2025)    Transportation Needs     Within the past 12 months, has lack of transportation kept you from medical appointments, getting your medicines, non-medical meetings or appointments, work, or from getting things that you need?: No   Physical Activity: Not on file   Interpersonal Safety: Low Risk  (2/28/2025)    Interpersonal Safety     Do you feel physically and emotionally safe where you currently live?: Yes     Within the past 12 months, have you been hit, slapped, kicked or otherwise physically hurt by someone?: No     Within the past 12 months, have you been humiliated or emotionally abused in other ways by your partner or ex-partner?: No   Stress: Not on file   Social Connections: Unknown (4/30/2023)    [email or web address]    Social Connections     Frequency of Communication with Friends and Family: Not on file   Health Literacy: Not on file       Functional Status:  Prior to admission patient needed assistance:   Dependent ADLs:: Ambulation-walker  Dependent IADLs:: Transportation       Mental Health Status:        No concerns reported    Chemical Dependency Status:        No concerns reported     Values/Beliefs:  Spiritual, Cultural Beliefs, Adventism Practices, Values that affect care:                 Discussed  Partnership in Safe Discharge Planning  document with patient/family: Yes:     Additional Information:  Kayley is a 89-year-old woman admitted with rectal bleeding , noted to have unplanned readmission risk of 23%. Met with pt at  bedside to assess for discharge needs. Pt reports living alone at [facility] and ambulates with a walker at baseline. Patient shared that her daughter is currently out of town and she will require a wheelchair ride for discharge. Reviewed out of pocket cost for [facility] transport, $90.70 base and $6.08 per mile to the destination. Kayley expressed understanding and is agreeable to this.      Next Steps: SW will assist with transport at the time of discharge. Following for any additional discharge needs.    AMA Mays, [facility]   Care Coordinator-Casual  [email or web address]

## 2025-02-28 NOTE — ED TRIAGE NOTES
Pt BIBA from independent senior living d/t rectal bleeding. When pt went to bed was having abd cramping. Woke up to use BR and had bright red liquid stool. Hx of myostinagravis, blood cx. Has needed blood transfusions in the past. VSS, ABCs intact, A&Ox4.

## 2025-02-28 NOTE — ED PROVIDER NOTES
Emergency Department Note      History of Present Illness     Chief Complaint   Rectal Bleeding    HPI   Kayley Putnam is a 89 year old female with a history of myelodysplastic syndrome, CAD, CHF, breast cancer, hypertension and hyperlipidemia presenting with rectal bleeding. The patient notes having cramping sensations throughout the day. She went to the bathroom around 2030 and noticed bright red blood in her stool associated with lightheadedness afterward. The patient feels well laying down with no recent fever or vomiting. Of note, Kayley reports her hemoglobin as 8.1 at the clinic about a week ago.    Independent Historian   None    Review of External Notes   I reviewed the ED note from 2/3/25. Normal labs during this visit.   I reviewed Care Everywhere and updated Epic.     Past Medical History     Medical History and Problem List   Past Medical History:   Diagnosis Date    Anemia     Atherosclerosis of aorta     Atrial fibrillation     Breast cancer     CAD (coronary artery disease)     CHF (congestive heart failure)     Chronic kidney disease     Hyperlipidemia     Hypertension     Mitral regurgitation     Myelodysplastic syndrome     Osteoarthritis      Medications   atorvastatin (LIPITOR) 20 MG tablet  furosemide (LASIX) 40 MG tablet  letrozole (FEMARA) 2.5 MG tablet  luspatercept-aamt (REBLOZYL) 25 MG injection  metoprolol tartrate (LOPRESSOR) 25 MG tablet      Surgical History   Past Surgical History:   Procedure Laterality Date    APPENDECTOMY  1958    ARTHROPLASTY KNEE Bilateral     HYSTERECTOMY      IR CHEST PORT PLACEMENT > 5 YRS OF AGE  05/24/2022    IRRIGATION AND DEBRIDEMENT BREAST Right 02/22/2020    Procedure: IRRIGATION AND DEBRIDEMENT, BREAST;  Surgeon: Avery Gloria MD;  Location: [facility] OR    IRRIGATION AND DEBRIDEMENT BREAST Right 02/22/2020    Procedure: IRRIGATION AND DEBRIDEMENT, BREAST;  Surgeon: Avery Gloria MD;  Location: [facility] OR    LUMPECTOMY BREAST, SEED LOCALIZATION, SENTINEL  NODE Right 03/04/2020    Procedure: RIGHT BREAST SEED LOCALIZED LUMPECTOMY WITH RIGHT SENTINEL NODE BIOPSY;  Surgeon: Lali Pope MD;  Location: [facility] OR    TONSILLECTOMY, ADENOIDECTOMY, COMBINED       Physical Exam     Patient Vitals for the past 24 hrs:   BP Temp Temp src Pulse Resp SpO2   02/28/25 0230 131/47 -- -- 71 -- 93 %   02/28/25 0215 132/57 -- -- 68 -- 94 %   02/28/25 0200 132/51 -- -- 72 -- 95 %   02/28/25 0145 -- -- -- -- -- 96 %   02/28/25 0144 -- -- -- -- -- 95 %   02/28/25 0143 -- -- -- -- -- 95 %   02/28/25 0142 -- -- -- -- -- 95 %   02/28/25 0141 -- -- -- -- -- 95 %   02/28/25 0140 -- -- -- -- -- 94 %   02/28/25 0135 113/49 -- -- 77 -- 94 %   02/28/25 0120 126/50 98.6  F (37  C) Oral 77 18 94 %     Physical Exam  Nursing note and vitals reviewed.  Constitutional: Cooperative.   HENT:   Mouth/Throat: Mucous membranes are normal.   Cardiovascular: Normal rate, regular rhythm. Left upper chest port noted, 3/6 systolic murmur.   Pulmonary/Chest: Effort normal and breath sounds normal. No respiratory distress. No wheezes. No rales.   Abdominal: Soft. Normal appearance and bowel sounds are normal. No distension. There is no tenderness. There is no rigidity and no guarding.   Neurological: Alert.  Oriented x 3  Skin: Skin is warm and dry.   Psychiatric: Normal mood and affect.      Diagnostics     Lab Results   Labs Ordered and Resulted from Time of ED Arrival to Time of ED Departure   INR - Abnormal       Result Value    INR 1.16 (*)    BASIC METABOLIC PANEL - Abnormal    Sodium 132 (*)     Potassium 4.7      Chloride 99      Carbon Dioxide (CO2) 21 (*)     Anion Gap 12      Urea Nitrogen 56.8 (*)     Creatinine 1.29 (*)     GFR Estimate 39 (*)     Calcium 9.3      Glucose 112 (*)    CBC WITH PLATELETS AND DIFFERENTIAL - Abnormal    WBC Count 1.6 (*)     RBC Count 1.85 (*)     Hemoglobin 6.7 (*)     Hematocrit 20.4 (*)      (*)     MCH 36.2 (*)     MCHC 32.8      RDW        Platelet Count 30  (*)    PARTIAL THROMBOPLASTIN TIME - Normal    aPTT 34     MANUAL DIFFERENTIAL   TYPE AND SCREEN, ADULT    ABO/RH(D) A POS      SPECIMEN EXPIRATION DATE [id number]       Imaging   No orders to display     Independent Interpretation   None    ED Course      Medications Administered   Packed red blood cell transfusion, 1 unit  Platelet transfusion, 1 unit    Discussion of Management   Admitting Hospitalist, Dr. Gordon    ED Course   ED Course as of 02/28/25 0302   Fri Feb 28, 2025   0130 I obtained the history and examined the patient as noted above.      0255 I rechecked and updated the patient.      0301 I spoke with Dr. Gordon from the hospitalist service regarding the patient's presentation, findings here in the ED, and plan of care.       Additional Documentation  None    Medical Decision Making / Diagnosis       DOMINGA Putnam is a 89 year old female with myelodysplastic syndrome who presents with rectal bleeding.  She had a transient episode of lightheadedness following this but no concerning symptoms for stroke.  I doubt cardiac causes.  Hemoglobin was 6.4 prompting packed red blood cell transfusion.  She is otherwise hemodynamically stable.  Given the active bleed and a platelet count of 30 we also ordered platelets.  She will be admitted at this time for hematology as well as gastroenterology consultation and blood product administration as noted above.    Critical care time involved in management of this patient exclusive of procedures: 30 minutes    Disposition   The patient was admitted to the hospital.     Diagnosis     ICD-10-CM    1. Lower GI bleed  K92.2       2. Anemia due to blood loss, acute on chronic  D62       3. Myelodysplastic syndrome (H)  D46.9       4. Thrombocytopenia  D69.6         Scribe Disclosure:  I, Tati Ingram, am serving as a scribe at 1:24 AM on 2/28/2025 to document services personally performed by Glen Akhtar MD based on my observations and the provider's  statements to me.        Glen Akhtar MD  02/28/25 [id number]

## 2025-02-28 NOTE — H&P
[facility]    History and Physical - Hospitalist Service       Date of Admission:  2/28/2025    Assessment & Plan      Kayley Putnam is a 89 year old female with a past medical history significant for MDS complicated by pancytopenia, breast cancer, severe mitral regurgitation, atrial fibrillation, not on anticoagulation, CKD, chronic hyponatremia who presented to the ER with complaint of bright red blood per rectum and was found to have acute on chronic anemia.     Upon presentation to the ER patient was afebrile, pulse 77, blood pressure 126/50, saturating well on room air.  Lab workup revealed WBC 1.6, hemoglobin 6.7, platelet 30, sodium 132, BUN 56.8, creatinine 1.29, blood glucose 112, INR 1.16.  1 unit of platelet and 1 unit of PRBCs was ordered in the ER.  Admission to hospital requested      Lower GI bleed  Acute on chronic anemia secondary to GI bleed  Patient reported 2 large and 1 small episode of bright red blood per rectum before coming to the hospital.  After having these episodes she felt lightheaded.  Fortunately she did not fall.  No shortness of breath or chest pain reported.  She appears pale on exam.  She has history of chronic anemia in the setting of MDS and receives transfusion as needed.  She was admitted to the [facility] back in January 2025 with acute on chronic anemia in the setting of melanotic stools.  GI was consulted that admission and recommended against endoscopy at that time.  Will continue with supportive management per night and will consult GI.     -N.p.o. for now/overnight in case if GI wants to do a scope  - 1 unit of platelets and 1 unit of packed red blood cells ordered in the ER  - Check CBC after transfusion  - GI consultation  - Start patient on IV normal saline at 75 mL/h while patient is n.p.o.      Pancytopenia  Myelodysplastic syndrome  Breast cancer  Follows [place] [facility] outpatient.  Patient is receiving Luspatercept every 3 weeks.   pancytopenia in the setting of MDS.  Transfusions as above.  Continue to monitor CBC.  Resume home letrozole after pharmacy med reconciliation    Severe mitral regurgitation secondary to possible torn chordae  During last hospitalization echocardiogram revealed severe mitral regurgitation with possible torn chordae.  Neurology consulted, appreciated recommendations.  Given her advanced age and pancytopenia she was not a surgical candidate and mitral valve was not favorable for MitraClip given significant mitral calcification and mild stenosis.  Cardiology recommended medical therapy with rate control of her A-fib and diuretics.     -Hold home Lasix for now in the setting of GI bleed  - Resume home metoprolol 6.25 mg twice daily with holding parameters       Atrial fibrillation  - Not on anticoagulation in the setting of pancytopenia  - Metoprolol as above    Chronic hyponatremia  Monitor    CKD stage IIIb   Stable.  Monitor creatinine    Diet: NPO for Medical/Clinical Reasons Except for: Meds, Ice Chips    DVT Prophylaxis: Pneumatic Compression Devices  Lee Catheter: Not present  Lines: PRESENT             Cardiac Monitoring: None  Code Status: No CPR- Do NOT Intubate      Clinically Significant Risk Factors Present on Admission         # Hyponatremia: Lowest Na = 132 mmol/L in last 2 days, will monitor as appropriate        # Coagulation Defect: INR = 1.16 (Ref range: 0.85 - 1.15) and/or PTT = 34 Seconds (Ref range: 22 - 38 Seconds), will monitor for bleeding  # Thrombocytopenia: Lowest platelets = 30 in last 2 days, will monitor for bleeding   # Hypertension: Noted on problem list      # Anemia: based on hgb <11  # Anemia: based on hgb <11                  Disposition Plan     Medically Ready for Discharge: Anticipated in 2-4 Days           Jessie Gordon MD  Hospitalist Service  [facility]  Securely message with [email or web address] (more info)  Text page via [facility] Paging/Directory      ______________________________________________________________________    Chief Complaint   Bright Right red blood per rectum    History is obtained from the patient, ER doctor, and the chart review    History of Present Illness     Kayley Putnam is a 89 year old female with a past medical history significant for MDS complicated by pancytopenia, breast cancer, severe mitral regurgitation, atrial fibrillation, not on anticoagulation, CKD, chronic hyponatremia who presented to the ER with complaint of bright red blood per rectum and was found to have acute on chronic anemia.     Patient reported that yesterday before coming to the hospital she has had 3 episodes of bright red blood per rectum.  She states that 2 episodes were larger and 1 was smaller.  After these episodes she started feeling dizzy but fortunately she did not fall.  Back in January she has had black stools but this time it was bright red color.  Patient is not on any anticoagulation.  She has chronic pancytopenia in the setting of MDS.  She receives transfusion as needed.      Past Medical History    Past Medical History:   Diagnosis Date    Anemia     Atherosclerosis of aorta     Atrial fibrillation     Breast cancer     CAD (coronary artery disease)     CHF (congestive heart failure)     Chronic kidney disease     Hyperlipidemia     Hypertension     Mitral regurgitation     Myelodysplastic syndrome     Osteoarthritis        Past Surgical History   Past Surgical History:   Procedure Laterality Date    APPENDECTOMY  1958    ARTHROPLASTY KNEE Bilateral     HYSTERECTOMY      IR CHEST PORT PLACEMENT > 5 YRS OF AGE  05/24/2022    IRRIGATION AND DEBRIDEMENT BREAST Right 02/22/2020    Procedure: IRRIGATION AND DEBRIDEMENT, BREAST;  Surgeon: Avery Gloria MD;  Location: [facility] OR    IRRIGATION AND DEBRIDEMENT BREAST Right 02/22/2020    Procedure: IRRIGATION AND DEBRIDEMENT, BREAST;  Surgeon: Avery Gloria MD;  Location: [facility] OR    LUMPECTOMY BREAST,  SEED LOCALIZATION, SENTINEL NODE Right 03/04/2020    Procedure: RIGHT BREAST SEED LOCALIZED LUMPECTOMY WITH RIGHT SENTINEL NODE BIOPSY;  Surgeon: Lali Pope MD;  Location: [facility] OR    TONSILLECTOMY, ADENOIDECTOMY, COMBINED         Prior to Admission Medications   Prior to Admission Medications   Prescriptions Last Dose Informant Patient Reported? Taking?   atorvastatin (LIPITOR) 20 MG tablet   Yes No   Sig: Take 20 mg by mouth At Bedtime    furosemide (LASIX) 40 MG tablet   No No   Sig: Take 0.5 tablets (20 mg) by mouth daily.   letrozole (FEMARA) 2.5 MG tablet   Yes No   Sig: Take 1 tablet by mouth daily.   luspatercept-aamt (REBLOZYL) 25 MG injection   Yes No   Sig: Inject 1 mg/kg subcutaneously every 21 days.   metoprolol tartrate (LOPRESSOR) 25 MG tablet   No No   Sig: Take 0.25 tablets (6.25 mg) by mouth 2 times daily.      Facility-Administered Medications: None        Review of Systems    The 10 point Review of Systems is negative other than noted in the HPI or here.      Physical Exam   Vital Signs: Temp: 98.6  F (37  C) Temp src: Oral BP: 131/47 Pulse: 71   Resp: 18 SpO2: 93 % O2 Device: None (Room air)    Weight: 0 lbs 0 oz    Constitutional: awake, alert, cooperative, no apparent distress, appears pale and appears stated age  Eyes: Anicteric sclera  ENT: normocephalic, without obvious abnormality  Respiratory: On room air, equal air entry bilaterally  Cardiovascular: Normal rate, regular rhythm, systolic murmur over the mitral valve area  GI: Soft, nontender, nondistended  Musculoskeletal: no lower extremity pitting edema present  Neurologic: Alert and oriented x 3, no focal deficit  Neuropsychiatric: Appropriate mood and affect    Medical Decision Making       65 MINUTES SPENT BY ME on the date of service doing chart review, history, exam, documentation & further activities per the note.      Data   ------------------------- PAST 24 HR DATA REVIEWED  -----------------------------------------------

## 2025-02-28 NOTE — ED NOTES
[facility]    ED Boarding Nurse Handoff Addendum Report:    Date/time: 2/28/2025, 5:39 AM    Neuro: Alert and Oriented x4  Activity: are 1 Assist with Gait Belt  Telemetry Monitoring: No  Pain: denying pain.  Labs / Tests: Hgb: 6.7. Platelets 30  GI: bowel sounds audible  : incontinent at times, purewick in place. Removed for transfer to MS Unit.   O2: RA  LDA's: Port  Fluids: Platelets  Diet: NPO and Ice chips  Consults: GI  Discharge Disposition:  TBD    Daughter, Winston Guillen would like to be kept up to date on her mothers plan of care. See sticky note or patient contact list for number.      Vital signs (within last 30 minutes):    Vitals:    02/28/25 0513 02/28/25 0613 02/28/25 0629 02/28/25 0643   BP: 116/43 117/53 123/66 127/49   BP Location:  Left arm     Patient Position:       Cuff Size:       Pulse: 79 75 68 71   Resp: 22 20 18 18   Temp: 97.9  F (36.6  C) 98.1  F (36.7  C) 98.1  F (36.7  C) 98.2  F (36.8  C)   TempSrc: Oral Oral Oral Oral   SpO2: 94% 93% 93% 93%       ED Boarding Nurse name: Andreina Sainz RN

## 2025-03-01 ENCOUNTER — APPOINTMENT (OUTPATIENT)
Dept: GENERAL RADIOLOGY | Facility: CLINIC | Age: OVER 89
DRG: 377 | End: 2025-03-01
Attending: INTERNAL MEDICINE
Payer: MEDICARE

## 2025-03-01 LAB
CREAT SERPL-MCNC: 1.03 MG/DL (ref 0.51–0.95)
EGFRCR SERPLBLD CKD-EPI 2021: 51 ML/MIN/1.73M2
ELLIPTOCYTES BLD QL SMEAR: SLIGHT
ERYTHROCYTE [DISTWIDTH] IN BLOOD BY AUTOMATED COUNT: ABNORMAL %
FRAGMENTS BLD QL SMEAR: ABNORMAL
HCT VFR BLD AUTO: 26.1 % (ref 35–47)
HGB BLD-MCNC: 8.5 G/DL (ref 11.7–15.7)
MCH RBC QN AUTO: 34.7 PG (ref 26.5–33)
MCHC RBC AUTO-ENTMCNC: 32.6 G/DL (ref 31.5–36.5)
MCV RBC AUTO: 107 FL (ref 78–100)
PLAT MORPH BLD: ABNORMAL
PLATELET # BLD AUTO: 22 10E3/UL (ref 150–450)
POLYCHROMASIA BLD QL SMEAR: SLIGHT
RBC # BLD AUTO: 2.45 10E6/UL (ref 3.8–5.2)
RBC MORPH BLD: ABNORMAL
SODIUM SERPL-SCNC: 132 MMOL/L (ref 135–145)
VARIANT LYMPHS BLD QL SMEAR: PRESENT
WBC # BLD AUTO: 2.9 10E3/UL (ref 4–11)

## 2025-03-01 PROCEDURE — 85027 COMPLETE CBC AUTOMATED: CPT | Performed by: INTERNAL MEDICINE

## 2025-03-01 PROCEDURE — 99233 SBSQ HOSP IP/OBS HIGH 50: CPT | Performed by: INTERNAL MEDICINE

## 2025-03-01 PROCEDURE — 258N000003 HC RX IP 258 OP 636: Performed by: INTERNAL MEDICINE

## 2025-03-01 PROCEDURE — 84295 ASSAY OF SERUM SODIUM: CPT | Performed by: INTERNAL MEDICINE

## 2025-03-01 PROCEDURE — 120N000001 HC R&B MED SURG/OB

## 2025-03-01 PROCEDURE — 82565 ASSAY OF CREATININE: CPT | Performed by: INTERNAL MEDICINE

## 2025-03-01 PROCEDURE — 250N000011 HC RX IP 250 OP 636: Performed by: INTERNAL MEDICINE

## 2025-03-01 PROCEDURE — 250N000013 HC RX MED GY IP 250 OP 250 PS 637: Performed by: STUDENT IN AN ORGANIZED HEALTH CARE EDUCATION/TRAINING PROGRAM

## 2025-03-01 PROCEDURE — 71046 X-RAY EXAM CHEST 2 VIEWS: CPT

## 2025-03-01 RX ORDER — CEFTRIAXONE 1 G/1
1 INJECTION, POWDER, FOR SOLUTION INTRAMUSCULAR; INTRAVENOUS EVERY 24 HOURS
Status: DISCONTINUED | OUTPATIENT
Start: 2025-03-01 | End: 2025-03-03

## 2025-03-01 RX ORDER — FUROSEMIDE 10 MG/ML
10 INJECTION INTRAMUSCULAR; INTRAVENOUS ONCE
Status: COMPLETED | OUTPATIENT
Start: 2025-03-01 | End: 2025-03-01

## 2025-03-01 RX ORDER — AZITHROMYCIN 250 MG/1
250 TABLET, FILM COATED ORAL DAILY
Status: DISCONTINUED | OUTPATIENT
Start: 2025-03-02 | End: 2025-03-03

## 2025-03-01 RX ADMIN — Medication 5 ML: at 06:21

## 2025-03-01 RX ADMIN — Medication 5 ML: at 21:44

## 2025-03-01 RX ADMIN — METOPROLOL TARTRATE 6.25 MG: 25 TABLET, FILM COATED ORAL at 08:04

## 2025-03-01 RX ADMIN — FUROSEMIDE 10 MG: 10 INJECTION, SOLUTION INTRAMUSCULAR; INTRAVENOUS at 12:58

## 2025-03-01 RX ADMIN — AZITHROMYCIN MONOHYDRATE 250 MG: 500 INJECTION, POWDER, LYOPHILIZED, FOR SOLUTION INTRAVENOUS at 18:36

## 2025-03-01 RX ADMIN — CEFTRIAXONE 1 G: 1 INJECTION, POWDER, FOR SOLUTION INTRAMUSCULAR; INTRAVENOUS at 20:05

## 2025-03-01 RX ADMIN — Medication 5 ML: at 13:15

## 2025-03-01 ASSESSMENT — ACTIVITIES OF DAILY LIVING (ADL)
ADLS_ACUITY_SCORE: 45
ADLS_ACUITY_SCORE: 45
ADLS_ACUITY_SCORE: 42
ADLS_ACUITY_SCORE: 45
ADLS_ACUITY_SCORE: 42
ADLS_ACUITY_SCORE: 42
ADLS_ACUITY_SCORE: 45
ADLS_ACUITY_SCORE: 42
ADLS_ACUITY_SCORE: 45
ADLS_ACUITY_SCORE: 42
ADLS_ACUITY_SCORE: 45
ADLS_ACUITY_SCORE: 42
ADLS_ACUITY_SCORE: 45

## 2025-03-01 NOTE — PLAN OF CARE
"Goal Outcome Evaluation:      Plan of Care Reviewed With: patient          Outcome Evaluation: VSS. A&Ox4. Denying pain. Up with SBA/walker in room. Denying symptoms present with admission. Tolerating now full liquid diet. No stool observed. Pt utilzing purewick and voiding adequately. Port remains hep locked. Plan for rechecking of blood work in the morning. Possible discharge tomorrow.      Problem: Adult Inpatient Plan of Care  Goal: Plan of Care Review  Description: The Plan of Care Review/Shift note should be completed every shift.  The Outcome Evaluation is a brief statement about your assessment that the patient is improving, declining, or no change.  This information will be displayed automatically on your shift  note.  Outcome: Progressing  Flowsheets (Taken 2/28/2025 1950)  Outcome Evaluation: VSS. A&Ox4. Denying pain. Up with SBA/walker in room. Denying symptoms present with admission. Tolerating now full liquid diet. No stool observed. Pt utilzing purewick and voiding adequately. Port remains hep locked. Plan for rechecking of blood work in the morning. Possible discharge tomorrow.  Plan of Care Reviewed With: patient  Goal: Patient-Specific Goal (Individualized)  Description: You can add care plan individualizations to a care plan. Examples of Individualization might be:  \"Parent requests to be called daily at 9am for status\", \"I have a hard time hearing out of my right ear\", or \"Do not touch me to wake me up as it startles  me\".  Outcome: Progressing  Goal: Absence of Hospital-Acquired Illness or Injury  Outcome: Progressing  Intervention: Identify and Manage Fall Risk  Recent Flowsheet Documentation  Taken 2/28/2025 1705 by Philip Morales, RN  Safety Promotion/Fall Prevention:   activity supervised   assistive device/personal items within reach  Intervention: Prevent Skin Injury  Recent Flowsheet Documentation  Taken 2/28/2025 1705 by Philip Morales, RN  Body Position: position changed " independently  Goal: Optimal Comfort and Wellbeing  Outcome: Progressing  Goal: Readiness for Transition of Care  Outcome: Progressing

## 2025-03-01 NOTE — PLAN OF CARE
"  Problem: Adult Inpatient Plan of Care  Goal: Plan of Care Review  Description: The Plan of Care Review/Shift note should be completed every shift.  The Outcome Evaluation is a brief statement about your assessment that the patient is improving, declining, or no change.  This information will be displayed automatically on your shift  note.  Outcome: Progressing  Flowsheets (Taken 3/1/2025 0454)  Outcome Evaluation: pt is alert and oriented, up with a standby assist and the walker. pt with external cath in place, adequate urine output. awaiting am hemoglobin, hopes to discharge to home today. Pt did desat with activity this am, got a little dizzy. Sats 85-86% on room air. As pt was short of breath, did add 2 liters of oxygen  iwht sats rebounding to 92-95%.   Plan of Care Reviewed With: patient  Overall Patient Progress: improving  Goal: Patient-Specific Goal (Individualized)  Description: You can add care plan individualizations to a care plan. Examples of Individualization might be:  \"Parent requests to be called daily at 9am for status\", \"I have a hard time hearing out of my right ear\", or \"Do not touch me to wake me up as it startles  me\".  Outcome: Progressing  Goal: Absence of Hospital-Acquired Illness or Injury  Outcome: Progressing  Intervention: Identify and Manage Fall Risk  Recent Flowsheet Documentation  Taken 2/28/2025 2308 by Glenna Olvera, RN  Safety Promotion/Fall Prevention:   activity supervised   lighting adjusted   clutter free environment maintained   nonskid shoes/slippers when out of bed   safety round/check completed  Intervention: Prevent Skin Injury  Recent Flowsheet Documentation  Taken 2/28/2025 2308 by Glenna Olvera RN  Body Position: position changed independently  Skin Protection: adhesive use limited  Goal: Optimal Comfort and Wellbeing  Outcome: Progressing  Goal: Readiness for Transition of Care  Outcome: Progressing     Problem: Skin Injury Risk Increased  Goal: Skin " Health and Integrity  Outcome: Progressing  Intervention: Plan: Nurse Driven Intervention: Moisture Management  Recent Flowsheet Documentation  Taken 2/28/2025 2308 by Glenna Olvera RN  Moisture Interventions:   Encourage regular toileting   Incontinence pad   Urinary collection device  Intervention: Optimize Skin Protection  Recent Flowsheet Documentation  Taken 2/28/2025 2308 by Glenna Olvera RN  Pressure Reduction Techniques: frequent weight shift encouraged  Skin Protection: adhesive use limited  Activity Management: activity adjusted per tolerance  Head of Bed (HOB) Positioning: HOB at 20-30 degrees   Goal Outcome Evaluation:      Plan of Care Reviewed With: patient    Overall Patient Progress: improvingOverall Patient Progress: improving    Outcome Evaluation: pt is alert and oriented, up with a standby assist and the walker. pt with external cath in place, adequate urine output. awaiting am hemoglobin, hopes to discharge to home today.

## 2025-03-01 NOTE — CARE PLAN
03/01/25 1200   Home Oxygen Assessment (RN/RT ONLY)   Does patient have oxygen at home? No   1. SpO2 on room air at rest while awake 90       Oxygen LPM at rest 2   3. SpO2 on room air during Activity/with exercise 84   4. SpO2 with oxygen during activity/with exercise 93       Oxygen LPM during activity/with exercise 2   Does patient qualify for Home O2? Yes

## 2025-03-01 NOTE — PROGRESS NOTES
[facility]    Medicine Progress Note - Hospitalist Service    Date of Admission:  2/28/2025    Assessment & Plan   Kayley Putnam is a 89 year old female with a past medical history significant for MDS complicated by pancytopenia, breast cancer, severe mitral regurgitation, atrial fibrillation, not on anticoagulation, CKD, chronic hyponatremia who presented to the ER with complaint of bright red blood per rectum and was found to have acute on chronic anemia.     1 unit of platelet and 1 unit of PRBCs was ordered in the ER.        Recurrent rectal bleeding  Acute on chronic anemia     No further rectal bleeding since arrival to the hospital  Admitted for similar events 1/2025 - at that time GI felt that she was not a safe candidate for endoscopies.  GI again consulted this admission and is again recommending supportive care    Currently she is tolerating full liquids without difficulty    She did receive 1 unit PRBC on admission  Hgb improved to 8.4 this am    Pancytopenia  Myelodysplastic syndrome  Breast cancer  Follows [place] [facility] outpatient (Dr. Norton)  Patient is receiving Luspatercept every 3 weeks.  Chronic  pancytopenia in the setting of progressing MDS.      Since admission did receive 1 unit PRBC, 1 unit platelets    Platelets at baseline (was 21 at time of discharge in 1/2025)  Hgb improved, as above     Severe mitral regurgitation secondary to possible torn chordae  During last hospitalization echocardiogram revealed severe mitral regurgitation with possible torn chordae.  Cardiology consulted, appreciated recommendations.  Given her advanced age and pancytopenia she was not a surgical candidate and mitral valve was not favorable for MitraClip given significant mitral calcification and mild stenosis.      Cardiology recommended medical therapy with rate control of her A-fib and diuretics.   - continue home metoprolol 6.25 mg twice daily with holding parameters     3/1/25 -  the above recommendations reviewed with her and she is voicing understanding    Acute hypoxic respiratory failure    Today is hypoxic on RA (84% per RN)  Tolerating supplemental oxygen  Feeling more SOB  CXR requested  Will give small dose of IV lasix this am - does not appear that she received any lasix since admission/post-transfusion    She may soon have a need for chronic home oxygen    Addendum - 1705  CXR reviewed.  Findings suggestive of bibasilar airspace opacities suggestive of bronchopneumonia.  Will start IV abx for CAP.         Atrial fibrillation, chronic  - Not on anticoagulation in the setting of pancytopenia  - tolerating PTA coreg 6.25mg po bid at this time     Chronic hyponatremia  Awaiting am labs  Sodium was 132 (2/28/25)     CKD stage IIIb  Awaiting am creat    DISPOSITION  3/1/25 - Spent some time today confirming DNR/I status.  She tells me that she will want to be kept comfortable in the case of acute medical decompensation/emergency.      She tells me that she understands that she is not a surgical candidate or good candidate for endoscopy.  She understands that her MDS and sever MR are chronic conditions that are worsening.    Addendum - 1250  Was able to connect with her daughter, Winston on the phone.  Hospital course, labs, plan of care discussed and questions answered.    Diet: full liquids now; will advance to soft diet later today  DVT Prophylaxis: Pneumatic Compression Devices  Lee Catheter: Not present      Cardiac Monitoring: None at this time  Code Status: No CPR- Do NOT Intubate      PPE Used:  Mask, gloves          Diet: Low Fiber Diet  Diet    DVT Prophylaxis: Pneumatic Compression Devices  Lee Catheter: Not present  Lines: PRESENT      Port a Cath 05/24/22 Single Lumen Left Chest wall-Site Assessment: WDL      Cardiac Monitoring: None  Code Status: No CPR- Do NOT Intubate      Clinically Significant Risk Factors         # Hyponatremia: Lowest Na = 132 mmol/L in last 2 days,  will monitor as appropriate        # Coagulation Defect: INR = 1.16 (Ref range: 0.85 - 1.15) and/or PTT = 34 Seconds (Ref range: 22 - 38 Seconds), will monitor for bleeding  # Thrombocytopenia: Lowest platelets = 22 in last 2 days, will monitor for bleeding   # Hypertension: Noted on problem list                       Disposition Plan     Medically Ready for Discharge: Anticipated Tomorrow             Malena Pollard,   Hospitalist Service  [facility]  Securely message with [employer] (more info)  Text page via [employer] Paging/Directory   ______________________________________________________________________    Interval History     Just had breakfast.  Got up with RN and felt nauseated.  No emesis or further rectal bleeding.  No CP.  Feeling somewhat SOB this am.  Tired and weak but thinks that she will be able to discharge home alone later today.      No HA, F/C or other new complaints    Physical Exam   Vital Signs: Temp: 98.7  F (37.1  C) Temp src: Oral BP: 128/71 Pulse: 76   Resp: 20 SpO2: 95 % O2 Device: Oxymask Oxygen Delivery: 2 LPM  Weight: 0 lbs 0 oz    GEN:  elderly female, Eek, very pale and appears fatigued. Slightly tachypnec this am at rest  HEENT:  Normocephalic/atraumatic, no scleral icterus, no nasal discharge, oxygen via nasal cannula in place  CV:  Regular rate and rhythm, + murmur present; S1 + S2 noted  LUNGS:  Clear to auscultation ant bilaterally, slight rales laterally bilaterally to ausc.  No clear wheeze or rhonchi. Min bilateral costal retractions.  Symmetric chest rise on inhalation noted.  ABD:  Active bowel sounds, soft, non-tender to light palpation throughout, not significantly distended.  No clear rebound/guarding/rigidity.  EXT:  trace pretibial edema bilaterally.  No cyanosis.    SKIN:  Dry to touch, no new exanthems noted in the visualized areas.  NEURO: no tremors at rest, alert and orientated    Medical Decision Making       60 MINUTES SPENT BY ME on  the date of service doing chart review, history, exam, documentation & further activities per the note.      Data   Medications   Current Facility-Administered Medications   Medication Dose Route Frequency Provider Last Rate Last Admin     Current Facility-Administered Medications   Medication Dose Route Frequency Provider Last Rate Last Admin    furosemide (LASIX) injection 10 mg  10 mg Intravenous Once Malena Pollard,         heparin lock flush 10 unit/mL injection 5-10 mL  5-10 mL Intracatheter Q24H Malena Pollard,         heparin lock flush 100 unit/mL injection 5-10 mL  5-10 mL Intracatheter Q28 Days Malena Pollard, DO        metoprolol tartrate (LOPRESSOR) quarter-tab 6.25 mg  6.25 mg Oral BID Jessie Gordon MD   6.25 mg at 03/01/25 0804    sodium chloride (PF) 0.9% PF flush 10-20 mL  10-20 mL Intracatheter Q28 Days Malena Pollard,         sodium chloride (PF) 0.9% PF flush 10-40 mL  10-40 mL Intracatheter Q7 Days Malena Pollard, DO        sodium chloride (PF) 0.9% PF flush 3 mL  3 mL Intracatheter Q8H Jessie Gordon MD         Labs and Imaging results below reviewed today.  Recent Labs   Lab 03/01/25  0621 02/28/25  1233 02/28/25  0143   WBC 2.9*  --  1.6*   HGB 8.5* 7.4* 6.7*   HCT 26.1*  --  20.4*   *  --  110*   PLT 22*  --  30*     Recent Labs   Lab 02/28/25  0143   *   POTASSIUM 4.7   CHLORIDE 99   CO2 21*   ANIONGAP 12   *   BUN 56.8*   CR 1.29*   GFRESTIMATED 39*   NATALIA 9.3     Recent Labs   Lab 03/01/25  0621 02/28/25  1233 02/28/25  0143   HGB 8.5* 7.4* 6.7*     Recent Labs   Lab 02/28/25  0143   INR 1.16*       No results found for this or any previous visit (from the past 24 hours).

## 2025-03-02 ENCOUNTER — APPOINTMENT (OUTPATIENT)
Dept: PHYSICAL THERAPY | Facility: CLINIC | Age: OVER 89
DRG: 377 | End: 2025-03-02
Attending: INTERNAL MEDICINE
Payer: MEDICARE

## 2025-03-02 LAB
CREAT SERPL-MCNC: 1.06 MG/DL (ref 0.51–0.95)
EGFRCR SERPLBLD CKD-EPI 2021: 49 ML/MIN/1.73M2
ELLIPTOCYTES BLD QL SMEAR: SLIGHT
ERYTHROCYTE [DISTWIDTH] IN BLOOD BY AUTOMATED COUNT: ABNORMAL %
FRAGMENTS BLD QL SMEAR: SLIGHT
HCT VFR BLD AUTO: 23.7 % (ref 35–47)
HGB BLD-MCNC: 8 G/DL (ref 11.7–15.7)
MCH RBC QN AUTO: 35.4 PG (ref 26.5–33)
MCHC RBC AUTO-ENTMCNC: 33.8 G/DL (ref 31.5–36.5)
MCV RBC AUTO: 105 FL (ref 78–100)
PLAT MORPH BLD: ABNORMAL
PLATELET # BLD AUTO: 22 10E3/UL (ref 150–450)
POLYCHROMASIA BLD QL SMEAR: SLIGHT
RBC # BLD AUTO: 2.26 10E6/UL (ref 3.8–5.2)
RBC MORPH BLD: ABNORMAL
SODIUM SERPL-SCNC: 135 MMOL/L (ref 135–145)
VARIANT LYMPHS BLD QL SMEAR: PRESENT
WBC # BLD AUTO: 1.4 10E3/UL (ref 4–11)

## 2025-03-02 PROCEDURE — 97530 THERAPEUTIC ACTIVITIES: CPT | Mod: GP

## 2025-03-02 PROCEDURE — 82565 ASSAY OF CREATININE: CPT | Performed by: INTERNAL MEDICINE

## 2025-03-02 PROCEDURE — 250N000013 HC RX MED GY IP 250 OP 250 PS 637: Performed by: STUDENT IN AN ORGANIZED HEALTH CARE EDUCATION/TRAINING PROGRAM

## 2025-03-02 PROCEDURE — 84295 ASSAY OF SERUM SODIUM: CPT | Performed by: INTERNAL MEDICINE

## 2025-03-02 PROCEDURE — 120N000001 HC R&B MED SURG/OB

## 2025-03-02 PROCEDURE — 85018 HEMOGLOBIN: CPT | Performed by: INTERNAL MEDICINE

## 2025-03-02 PROCEDURE — 250N000013 HC RX MED GY IP 250 OP 250 PS 637: Performed by: INTERNAL MEDICINE

## 2025-03-02 PROCEDURE — 250N000011 HC RX IP 250 OP 636: Performed by: INTERNAL MEDICINE

## 2025-03-02 PROCEDURE — 99232 SBSQ HOSP IP/OBS MODERATE 35: CPT | Performed by: INTERNAL MEDICINE

## 2025-03-02 PROCEDURE — 97161 PT EVAL LOW COMPLEX 20 MIN: CPT | Mod: GP

## 2025-03-02 RX ADMIN — SENNOSIDES AND DOCUSATE SODIUM 1 TABLET: 50; 8.6 TABLET ORAL at 15:31

## 2025-03-02 RX ADMIN — Medication 5 ML: at 06:07

## 2025-03-02 RX ADMIN — AZITHROMYCIN DIHYDRATE 250 MG: 250 TABLET ORAL at 11:23

## 2025-03-02 RX ADMIN — CEFTRIAXONE 1 G: 1 INJECTION, POWDER, FOR SOLUTION INTRAMUSCULAR; INTRAVENOUS at 17:53

## 2025-03-02 ASSESSMENT — ACTIVITIES OF DAILY LIVING (ADL)
ADLS_ACUITY_SCORE: 47
ADLS_ACUITY_SCORE: 47
ADLS_ACUITY_SCORE: 45
ADLS_ACUITY_SCORE: 47
ADLS_ACUITY_SCORE: 47
ADLS_ACUITY_SCORE: 45
ADLS_ACUITY_SCORE: 45
ADLS_ACUITY_SCORE: 47
ADLS_ACUITY_SCORE: 45
ADLS_ACUITY_SCORE: 45
ADLS_ACUITY_SCORE: 47
ADLS_ACUITY_SCORE: 45
ADLS_ACUITY_SCORE: 45
ADLS_ACUITY_SCORE: 47
ADLS_ACUITY_SCORE: 45
ADLS_ACUITY_SCORE: 45
ADLS_ACUITY_SCORE: 47
ADLS_ACUITY_SCORE: 45
ADLS_ACUITY_SCORE: 45
ADLS_ACUITY_SCORE: 47
ADLS_ACUITY_SCORE: 45
ADLS_ACUITY_SCORE: 45
ADLS_ACUITY_SCORE: 47

## 2025-03-02 NOTE — CARE PLAN
03/02/25 1123   Home Oxygen Assessment (RN/RT ONLY)   Does patient have oxygen at home? No   1. SpO2 on room air at rest while awake 90       Oxygen LPM at rest 2   3. SpO2 on room air during Activity/with exercise 87   4. SpO2 with oxygen during activity/with exercise 92       Oxygen LPM during activity/with exercise 2   Does patient qualify for Home O2? Yes

## 2025-03-02 NOTE — PROGRESS NOTES
[facility]  Hospitalist Progress Note  David Ha MD  03/02/2025    Assessment & Plan   Kayley Putnam is a 89 year old female with a past medical history significant for MDS complicated by pancytopenia, breast cancer, severe mitral regurgitation, atrial fibrillation, not on anticoagulation, CKD, chronic hyponatremia who presented to the ER with complaint of bright red blood per rectum and was found to have acute on chronic anemia.      Recurrent rectal bleeding  Acute on chronic anemia     1 unit of platelet and 1 unit of PRBCs was ordered in the ER.        No further rectal bleeding since arrival to the hospital  Admitted for similar events 1/2025 - at that time GI felt that she was not a safe candidate for endoscopies.  GI again consulted this admission and is again recommending supportive care     Currently she is tolerating low fiber diet        She did receive 1 unit PRBC on admission  Hgb improved to 8.5 >> 8, will repeat on 3/3  3/2 having brown stool     Pancytopenia  Myelodysplastic syndrome  Breast cancer  Follows [place] [facility] outpatient (Dr. Norton)  Patient is receiving Luspatercept every 3 weeks.  Chronic  pancytopenia in the setting of progressing MDS.       Since admission did receive 1 unit PRBC, 1 unit platelets     Platelets at baseline (was 21 at time of discharge in 1/2025)  Hgb improved, as above     Severe mitral regurgitation secondary to possible torn chordae  During last hospitalization echocardiogram revealed severe mitral regurgitation with possible torn chordae.  Cardiology consulted, appreciated recommendations.  Given her advanced age and pancytopenia she was not a surgical candidate and mitral valve was not favorable for MitraClip given significant mitral calcification and mild stenosis.       Cardiology recommended medical therapy with rate control of her A-fib and diuretics.   - continue home metoprolol 6.25 mg twice daily with holding  parameters     3/1/25 - the above recommendations reviewed with her and she is voicing understanding     Acute hypoxic respiratory failure  Today 97% on 1 L and 90% on room  Tolerating supplemental oxygen  Feeling more SOB  CXR shows bronchopneumonia pattern, she was started on Azithromycin and Ceftriaxone.  Was given small dose of IV lasix this am - does not appear that she received any lasix since admission/post-transfusion     She may soon have a need for chronic home oxygen     Atrial fibrillation, chronic  - Not on anticoagulation in the setting of pancytopenia  - tolerating PTA coreg 6.25mg po bid at this time     Chronic hyponatremia  Awaiting am labs  Sodium was 132 (2/28/25)     CKD stage IIIb  Awaiting am creat     DISPOSITION  3/1/25 - Spent some time today confirming DNR/I status.  She tells me that she will want to be kept comfortable in the case of acute medical decompensation/emergency.       She tells me that she understands that she is not a surgical candidate or good candidate for endoscopy.  She understands that her MDS and sever MR are chronic conditions that are worsening.     Diet: low residue diet  DVT Prophylaxis: Pneumatic Compression Devices  Lee Catheter: Not present      Cardiac Monitoring: None at this time  Code Status: No CPR- Do NOT Intubate         Diet: Low Fiber Diet  Diet    DVT Prophylaxis: Pneumatic Compression Devices  Lee Catheter: Not present  Lines: PRESENT      Port a Cath 05/24/22 Single Lumen Left Chest wall-Site Assessment: WDL      Cardiac Monitoring: None  Code Status: No CPR- Do NOT Intubate          Clinically Significant Risk Factors         # Hyponatremia: Lowest Na = 132 mmol/L in last 2 days, will monitor as appropriate        # Coagulation Defect: INR = 1.16 (Ref range: 0.85 - 1.15) and/or PTT = 34 Seconds (Ref range: 22 - 38 Seconds), will monitor for bleeding  # Thrombocytopenia: Lowest platelets = 22 in last 2 days, will monitor for bleeding   #  Hypertension: Noted on problem list            Disposition Plan     Medically Ready for Discharge:   -- anticipate on 3/4 to TCU    Disposition:   -- to TCU    Interval History   -- chart reviewed  -- discussed with RN    -Data reviewed today: I reviewed all new labs and imaging over the last 24 hours. I personally reviewed no images or EKG's today.    Physical Exam    , Blood pressure 110/54, pulse 76, temperature 99.6  F (37.6  C), temperature source Oral, resp. rate 18, SpO2 95%.  There were no vitals filed for this visit.  Vital Signs with Ranges  Temp:  [99.2  F (37.3  C)-99.6  F (37.6  C)] 99.6  F (37.6  C)  Pulse:  [76-85] 76  Resp:  [16-18] 18  BP: (110-116)/(44-54) 110/54  SpO2:  [92 %-95 %] 95 %  I/O's Last 24 hours  I/O last 3 completed shifts:  In: -   Out: 1100 [Urine:1100]    Constitutional: Awake, alert, cooperative, no apparent distress, on 2 L  Respiratory: Clear to auscultation bilaterally, no crackles or wheezing  Cardiovascular: Regular rate and rhythm, normal S1 and S2, + murmur noted  GI: Normal bowel sounds, soft, non-distended, non-tender  Skin/Integumen: No rashes, no cyanosis, no edema  Other:      Medications   All medications were reviewed.  Current Facility-Administered Medications   Medication Dose Route Frequency Provider Last Rate Last Admin     Current Facility-Administered Medications   Medication Dose Route Frequency Provider Last Rate Last Admin    azithromycin (ZITHROMAX) tablet 250 mg  250 mg Oral Daily Malena Pollard, DO   250 mg at 03/02/25 1123    cefTRIAXone (ROCEPHIN) 1 g vial to attach to  mL bag for ADULTS or NS 50 mL bag for PEDS  1 g Intravenous Q24H Malena Pollard DO   1 g at 03/01/25 2005    heparin lock flush 10 unit/mL injection 5-10 mL  5-10 mL Intracatheter Q24H Malena Pollard DO   5 mL at 03/02/25 0607    heparin lock flush 100 unit/mL injection 5-10 mL  5-10 mL Intracatheter Q28 Days Malena Pollard DO         metoprolol tartrate (LOPRESSOR) quarter-tab 6.25 mg  6.25 mg Oral BID Jessie Gordon MD   6.25 mg at 03/01/25 0804    sodium chloride (PF) 0.9% PF flush 10-20 mL  10-20 mL Intracatheter Q28 Days Malena Pollard,         sodium chloride (PF) 0.9% PF flush 10-40 mL  10-40 mL Intracatheter Q7 Days Malena Pollard,         sodium chloride (PF) 0.9% PF flush 3 mL  3 mL Intracatheter Q8H Jessie Gordon MD            Data   Recent Labs   Lab 03/02/25  0605 03/01/25  1255 03/01/25  0621 02/28/25  1233 02/28/25  0143   WBC 1.4*  --  2.9*  --  1.6*   HGB 8.0*  --  8.5* 7.4* 6.7*   *  --  107*  --  110*   PLT 22*  --  22*  --  30*   INR  --   --   --   --  1.16*    132*  --   --  132*   POTASSIUM  --   --   --   --  4.7   CHLORIDE  --   --   --   --  99   CO2  --   --   --   --  21*   BUN  --   --   --   --  56.8*   CR 1.06* 1.03*  --   --  1.29*   ANIONGAP  --   --   --   --  12   NATALIA  --   --   --   --  9.3   GLC  --   --   --   --  112*       No results found for this or any previous visit (from the past 24 hours).    David Ha MD  Text Page  (7am to 6pm)

## 2025-03-02 NOTE — CONSULTS
"Care Management Follow Up    Length of Stay (days): 2    Expected Discharge Date: 03/02/2025     Concerns to be Addressed:       Patient plan of care discussed at interdisciplinary rounds: Yes    Anticipated Discharge Disposition: Home              Anticipated Discharge Services:    Anticipated Discharge DME:      Patient/family educated on Medicare website which has current facility and service quality ratings:    Education Provided on the Discharge Plan:    Patient/Family in Agreement with the Plan:      Referrals Placed by CM/SW:    Private pay costs discussed:     Discussed  Partnership in Safe Discharge Planning  document with patient/family: discussed previously    Handoff Completed: No, handoff not indicated or clinically appropriate    Additional Information:  SW attempted to meet with patient this afternoon but she was sleeping and did not respond to SW. SW called the patient's daughter, Winston, to discuss PT's TCU recommendation. Winston shared that the patient has done TCU in the past and has had mixed experiences. She shared that she has been at [facility] twice and one time was good and the other was \"the worst\". She also went to a TCU in [place]. Winston shared that her mother was not eager to return to TCU but understood she was too weak to return home. SW told daughter about the medicare.gov website. Winston requested SW place referrals to facilities in the [facility]. Winston was undecided about whether or not to refer to [facility] again, so will hold off on that but can place the referral if a bed is not found elsewhere.     Next Steps: Follow up on referrals sent to  [facility], [facility].     Arrange transportation as patient's daughter is in [place].     AMA M[place], [facility]   Care Coordinator-Casual  [email or web address]      "

## 2025-03-02 NOTE — PROGRESS NOTES
03/02/25 0931   Appointment Info   Signing Clinician's Name / Credentials (PT) Latricia Yao DPT   Living Environment   People in Home alone   Current Living Arrangements independent living facility   Home Accessibility no concerns   Transportation Anticipated agency   Living Environment Comments Pt lives alone in ILF   Self-Care   Usual Activity Tolerance moderate   Current Activity Tolerance poor   Equipment Currently Used at Home shower chair;walker, rolling  (power scooter)   Fall history within last six months no   Activity/Exercise/Self-Care Comment Rachel with 4WW at baseline in the apartment, utilizes power chair to go down for meals   General Information   Onset of Illness/Injury or Date of Surgery 02/28/25   Referring Physician Malena Pollard, DO   Patient/Family Therapy Goals Statement (PT) progress mobility   Pertinent History of Current Problem (include personal factors and/or comorbidities that impact the POC) Kayley Putnam is a 89 year old female with a past medical history significant for MDS complicated by pancytopenia, breast cancer, severe mitral regurgitation, atrial fibrillation, not on anticoagulation, CKD, chronic hyponatremia who presented to the ER with complaint of bright red blood per rectum and was found to have acute on chronic anemia.   Existing Precautions/Restrictions fall   Cognition   Affect/Mental Status (Cognition) WFL   Orientation Status (Cognition) oriented x 3   Follows Commands (Cognition) follows one-step commands;over 90% accuracy;delayed response/completion;increased processing time needed  ([place])   Pain Assessment   Patient Currently in Pain No   Integumentary/Edema   Integumentary/Edema Comments normal aging changes   Posture    Posture Forward head position;Protracted shoulders;Kyphosis   Range of Motion (ROM)   Range of Motion ROM is WFL   Strength (Manual Muscle Testing)   Strength (Manual Muscle Testing) Deficits observed during functional mobility   Bed  Mobility   Comment, (Bed Mobility) SBA supine>sit   Transfers   Comment, (Transfers) Silvio sit<>stand with 4WW   Gait/Stairs (Locomotion)   Comment, (Gait/Stairs) unable at eval   Balance   Balance Comments fair sitting balance, poor standing balance with 4WW   Sensory Examination   Sensory Perception patient reports no sensory changes   Clinical Impression   Criteria for Skilled Therapeutic Intervention Yes, treatment indicated   PT Diagnosis (PT) impaired mobility   Influenced by the following impairments decreased activity tolerance, weakness, impaired balance   Functional limitations due to impairments impaired bed mobility, transfers, ambulation   Clinical Presentation (PT Evaluation Complexity) evolving   Clinical Presentation Rationale clinical judgement, chart review   Clinical Decision Making (Complexity) moderate complexity   Planned Therapy Interventions (PT) balance training;bed mobility training;gait training;home exercise program;neuromuscular re-education;patient/family education;postural re-education;strengthening;transfer training;progressive activity/exercise   Risk & Benefits of therapy have been explained evaluation/treatment results reviewed;care plan/treatment goals reviewed;risks/benefits reviewed;participants voiced agreement with care plan;participants included;patient   PT Total Evaluation Time   PT Eval, Low Complexity Minutes (43672) 8   Physical Therapy Goals   PT Frequency 5x/week   PT Predicted Duration/Target Date for Goal Attainment 03/09/25   PT Goals Bed Mobility;Transfers;Gait   PT: Bed Mobility Independent;Supine to/from sit   PT: Transfers Modified independent;Sit to/from stand;Assistive device   PT: Gait Modified independent;Rolling walker   Interventions   Interventions Quick Adds Therapeutic Activity   Therapeutic Activity   Therapeutic Activities: dynamic activities to improve functional performance Minutes (46453) 30   Symptoms Noted During/After Treatment Fatigue;Shortness  of breath   Treatment Detail/Skilled Intervention Pt supine upon arrival, agreeable. On RA at 90% SpO2. After eval, extended time sitting EOB, significant SOB, SpO2 87%, increased to 2LO2, still having difficulty recovering SOB, however SpO2 increasing to 92% with O2 donned. Completed x3 sit<>stands with 4WW, Silvio with significant time to rise. Only able to stand ~5sec before needing seated rest. Continued edu on breathing techniques as pt taking short shallow breaths. Returned to supine CGA. Discussed rec of TCU, agreeable. Left with alarm on and needs in reach, nurse updated.   PT Discharge Planning   PT Plan bring 4WW, monitor O2, progress transfers, initiate ambulation as able   PT Discharge Recommendation (DC Rec) Transitional Care Facility   PT Rationale for DC Rec Pt is well below baseline level of moblity, she is currently only able to tolerate standing EOB with Ax1 and walker, unable to ambulate d/t significant fatigue and SOB. Patient lives alone in an ILF, unable to return home safely. Pt requires TCU to progress independence with mobility, activity tolerance, strength, and balance.   PT Brief overview of current status Ax1 pivots   PT Total Distance Amb During Session (feet) 0   Physical Therapy Time and Intention   Timed Code Treatment Minutes 30   Total Session Time (sum of timed and untimed services) 38

## 2025-03-02 NOTE — PLAN OF CARE
"Pt alert and oriented. Denies pain. On RA. Up with SBA and walker. External catheter in place, draining adequately. Port heparin locked.     Problem: Adult Inpatient Plan of Care  Goal: Plan of Care Review  Description: The Plan of Care Review/Shift note should be completed every shift.  The Outcome Evaluation is a brief statement about your assessment that the patient is improving, declining, or no change.  This information will be displayed automatically on your shift  note.  Outcome: Progressing  Flowsheets (Taken 3/2/2025 0631)  Outcome Evaluation: Pt alert and oriented. Denies pain. On RA. Up with SBA and walker. External catheter in place, draining adequately. Port heparin locked.  Plan of Care Reviewed With: patient  Overall Patient Progress: no change  Goal: Patient-Specific Goal (Individualized)  Description: You can add care plan individualizations to a care plan. Examples of Individualization might be:  \"Parent requests to be called daily at 9am for status\", \"I have a hard time hearing out of my right ear\", or \"Do not touch me to wake me up as it startles  me\".  Outcome: Progressing  Goal: Absence of Hospital-Acquired Illness or Injury  Outcome: Progressing  Goal: Optimal Comfort and Wellbeing  Outcome: Progressing  Goal: Readiness for Transition of Care  Outcome: Progressing     Problem: Skin Injury Risk Increased  Goal: Skin Health and Integrity  Outcome: Progressing     Problem: Infection  Goal: Absence of Infection Signs and Symptoms  Outcome: Progressing   Goal Outcome Evaluation:      Plan of Care Reviewed With: patient    Overall Patient Progress: no changeOverall Patient Progress: no change    Outcome Evaluation: Pt alert and oriented. Denies pain. On RA. Up with SBA and walker. External catheter in place, draining adequately. Port heparin locked.      "

## 2025-03-02 NOTE — PLAN OF CARE
Goal Outcome Evaluation:      Plan of Care Reviewed With: patient    Overall Patient Progress: decliningOverall Patient Progress: declining     Pt A&O. Denies pain. Transfers with Ax1. Tolerating low fiber diet. Port in place. MD notified d/t pt 84% on RA, requiring 2L O2, transferring with Ax1- getting lightheaded/dizzy-  PT  consult, Chest XR complete, one time dose of lasix given. AUO- external catheter.

## 2025-03-02 NOTE — PLAN OF CARE
"Goal Outcome Evaluation:      Plan of Care Reviewed With: patient    Overall Patient Progress: decliningOverall Patient Progress: declining    Outcome Evaluation: Pt AOx4. Denies pain. Limited mobility due to reduced strength and dyspnea with exertion. Essentially only able to stand at side of bed with PT this morning put on 2L o2. O2 stats stable with rest 90s on RA, 97 on 1L. As low as 84-87 on RA with min exert, recovers to 92% on 2L. PT recommending TCU. ABX switched to PO this morning and were administered. Repositioning in bed. External cath. Pt states no BM since thursday, stool softner given this afternoon. Port accessed - hep locked.      Problem: Adult Inpatient Plan of Care  Goal: Plan of Care Review  Description: The Plan of Care Review/Shift note should be completed every shift.  The Outcome Evaluation is a brief statement about your assessment that the patient is improving, declining, or no change.  This information will be displayed automatically on your shift  note.  Outcome: Not Progressing  Flowsheets (Taken 3/2/2025 1522)  Outcome Evaluation: Pt AOx4. Denies pain. Limited mobility due to reduced strength and dyspnea with exertion. Essentially only able to stand at side of bed with PT this morning put on 2L o2. O2 stats stable with rest 90s on RA, 97 on 1L. As low as 84-87 on RA with min exert, recovers to 92% on 2L. PT recommending TCU. ABX switched to PO this morning and were administered. Repositioning in bed. External cath. Pt states no BM since thursday, stool softner given this afternoon.  Plan of Care Reviewed With: patient  Overall Patient Progress: declining  Goal: Patient-Specific Goal (Individualized)  Description: You can add care plan individualizations to a care plan. Examples of Individualization might be:  \"Parent requests to be called daily at 9am for status\", \"I have a hard time hearing out of my right ear\", or \"Do not touch me to wake me up as it startles  me\".  Outcome: Not " Progressing  Goal: Absence of Hospital-Acquired Illness or Injury  Outcome: Not Progressing  Intervention: Identify and Manage Fall Risk  Recent Flowsheet Documentation  Taken 3/2/2025 1123 by Solitario Wren RN  Safety Promotion/Fall Prevention: activity supervised  Intervention: Prevent Infection  Recent Flowsheet Documentation  Taken 3/2/2025 1123 by Solitario Wren RN  Infection Prevention: rest/sleep promoted  Goal: Optimal Comfort and Wellbeing  Outcome: Not Progressing  Goal: Readiness for Transition of Care  Outcome: Not Progressing     Problem: Skin Injury Risk Increased  Goal: Skin Health and Integrity  Outcome: Not Progressing  Intervention: Plan: Nurse Driven Intervention: Moisture Management  Recent Flowsheet Documentation  Taken 3/2/2025 1123 by Solitario Wren RN  Moisture Interventions:   Incontinence pad   Urinary collection device  Intervention: Plan: Nurse Driven Intervention: Friction and Shear  Recent Flowsheet Documentation  Taken 3/2/2025 1123 by Solitario Wren RN  Friction/Shear Interventions: HOB 30 degrees or less     Problem: Infection  Goal: Absence of Infection Signs and Symptoms  Outcome: Not Progressing

## 2025-03-02 NOTE — CONSULTS
SW consulted for discharge planning. SW already following.  See previous note.    Katelyn Pettit, AMA, LICSW  Emergency Department/Inpatient Float  Care Coordination  [facility]

## 2025-03-03 ENCOUNTER — DOCUMENTATION ONLY (OUTPATIENT)
Dept: OTHER | Facility: CLINIC | Age: OVER 89
End: 2025-03-03
Payer: MEDICARE

## 2025-03-03 LAB
ANION GAP SERPL CALCULATED.3IONS-SCNC: 8 MMOL/L (ref 7–15)
BASOPHILS # BLD MANUAL: 0 10E3/UL (ref 0–0.2)
BASOPHILS NFR BLD MANUAL: 0 %
BLD PROD TYP BPU: NORMAL
BLOOD COMPONENT TYPE: NORMAL
BUN SERPL-MCNC: 40.3 MG/DL (ref 8–23)
CALCIUM SERPL-MCNC: 8.7 MG/DL (ref 8.8–10.4)
CHLORIDE SERPL-SCNC: 100 MMOL/L (ref 98–107)
CODING SYSTEM: NORMAL
CREAT SERPL-MCNC: 1.25 MG/DL (ref 0.51–0.95)
CROSSMATCH: NORMAL
EGFRCR SERPLBLD CKD-EPI 2021: 40 ML/MIN/1.73M2
ELLIPTOCYTES BLD QL SMEAR: SLIGHT
ELLIPTOCYTES BLD QL SMEAR: SLIGHT
EOSINOPHIL # BLD MANUAL: 0 10E3/UL (ref 0–0.7)
EOSINOPHIL NFR BLD MANUAL: 3 %
ERYTHROCYTE [DISTWIDTH] IN BLOOD BY AUTOMATED COUNT: ABNORMAL %
FRAGMENTS BLD QL SMEAR: ABNORMAL
FRAGMENTS BLD QL SMEAR: ABNORMAL
GIANT PLATELETS BLD QL SMEAR: SLIGHT
GIANT PLATELETS BLD QL SMEAR: SLIGHT
GLUCOSE SERPL-MCNC: 105 MG/DL (ref 70–99)
HCO3 SERPL-SCNC: 21 MMOL/L (ref 22–29)
HCT VFR BLD AUTO: 19.8 % (ref 35–47)
HGB BLD-MCNC: 6.5 G/DL (ref 11.7–15.7)
HGB BLD-MCNC: 8.7 G/DL (ref 11.7–15.7)
ISSUE DATE AND TIME: NORMAL
LACTATE SERPL-SCNC: 1.5 MMOL/L (ref 0.7–2)
LYMPHOCYTES # BLD MANUAL: 0.3 10E3/UL (ref 0.8–5.3)
LYMPHOCYTES NFR BLD MANUAL: 25 %
MCH RBC QN AUTO: 34.8 PG (ref 26.5–33)
MCHC RBC AUTO-ENTMCNC: 32.8 G/DL (ref 31.5–36.5)
MCV RBC AUTO: 106 FL (ref 78–100)
METAMYELOCYTES # BLD MANUAL: 0 10E3/UL
METAMYELOCYTES NFR BLD MANUAL: 1 %
MONOCYTES # BLD MANUAL: 0.1 10E3/UL (ref 0–1.3)
MONOCYTES NFR BLD MANUAL: 9 %
MRSA DNA SPEC QL NAA+PROBE: NEGATIVE
MYELOCYTES # BLD MANUAL: 0 10E3/UL
MYELOCYTES NFR BLD MANUAL: 1 %
NEUTROPHILS # BLD MANUAL: 0.8 10E3/UL (ref 1.6–8.3)
NEUTROPHILS NFR BLD MANUAL: 61 %
NRBC # BLD AUTO: 0 10E3/UL
NRBC BLD MANUAL-RTO: 2 %
NT-PROBNP SERPL-MCNC: 3539 PG/ML (ref 0–1800)
PLAT MORPH BLD: ABNORMAL
PLAT MORPH BLD: ABNORMAL
PLATELET # BLD AUTO: 21 10E3/UL (ref 150–450)
POLYCHROMASIA BLD QL SMEAR: SLIGHT
POLYCHROMASIA BLD QL SMEAR: SLIGHT
POTASSIUM SERPL-SCNC: 4.2 MMOL/L (ref 3.4–5.3)
PROCALCITONIN SERPL IA-MCNC: 0.4 NG/ML
RBC # BLD AUTO: 1.87 10E6/UL (ref 3.8–5.2)
RBC MORPH BLD: ABNORMAL
RBC MORPH BLD: ABNORMAL
SA TARGET DNA: NEGATIVE
SODIUM SERPL-SCNC: 129 MMOL/L (ref 135–145)
UNIT ABO/RH: NORMAL
UNIT NUMBER: NORMAL
UNIT STATUS: NORMAL
UNIT TYPE ISBT: 6200
WBC # BLD AUTO: 1.3 10E3/UL (ref 4–11)
WBC # BLD AUTO: 1.4 10E3/UL (ref 4–11)

## 2025-03-03 PROCEDURE — 250N000013 HC RX MED GY IP 250 OP 250 PS 637: Performed by: STUDENT IN AN ORGANIZED HEALTH CARE EDUCATION/TRAINING PROGRAM

## 2025-03-03 PROCEDURE — 85007 BL SMEAR W/DIFF WBC COUNT: CPT | Performed by: INTERNAL MEDICINE

## 2025-03-03 PROCEDURE — 87640 STAPH A DNA AMP PROBE: CPT | Performed by: INTERNAL MEDICINE

## 2025-03-03 PROCEDURE — 250N000012 HC RX MED GY IP 250 OP 636 PS 637: Performed by: INTERNAL MEDICINE

## 2025-03-03 PROCEDURE — 87641 MR-STAPH DNA AMP PROBE: CPT | Performed by: INTERNAL MEDICINE

## 2025-03-03 PROCEDURE — 120N000001 HC R&B MED SURG/OB

## 2025-03-03 PROCEDURE — 250N000011 HC RX IP 250 OP 636: Performed by: INTERNAL MEDICINE

## 2025-03-03 PROCEDURE — P9016 RBC LEUKOCYTES REDUCED: HCPCS | Performed by: INTERNAL MEDICINE

## 2025-03-03 PROCEDURE — 250N000013 HC RX MED GY IP 250 OP 250 PS 637: Performed by: INTERNAL MEDICINE

## 2025-03-03 PROCEDURE — 83880 ASSAY OF NATRIURETIC PEPTIDE: CPT | Performed by: INTERNAL MEDICINE

## 2025-03-03 PROCEDURE — 99233 SBSQ HOSP IP/OBS HIGH 50: CPT | Performed by: INTERNAL MEDICINE

## 2025-03-03 PROCEDURE — 84145 PROCALCITONIN (PCT): CPT | Performed by: INTERNAL MEDICINE

## 2025-03-03 PROCEDURE — 80048 BASIC METABOLIC PNL TOTAL CA: CPT | Performed by: INTERNAL MEDICINE

## 2025-03-03 PROCEDURE — 83605 ASSAY OF LACTIC ACID: CPT | Performed by: INTERNAL MEDICINE

## 2025-03-03 PROCEDURE — 85027 COMPLETE CBC AUTOMATED: CPT | Performed by: INTERNAL MEDICINE

## 2025-03-03 PROCEDURE — 85018 HEMOGLOBIN: CPT | Performed by: INTERNAL MEDICINE

## 2025-03-03 RX ORDER — FUROSEMIDE 10 MG/ML
40 INJECTION INTRAMUSCULAR; INTRAVENOUS ONCE
Status: COMPLETED | OUTPATIENT
Start: 2025-03-03 | End: 2025-03-03

## 2025-03-03 RX ORDER — PREDNISONE 20 MG/1
40 TABLET ORAL DAILY
Status: COMPLETED | OUTPATIENT
Start: 2025-03-03 | End: 2025-03-07

## 2025-03-03 RX ADMIN — Medication 5 ML: at 06:45

## 2025-03-03 RX ADMIN — METOPROLOL TARTRATE 6.25 MG: 25 TABLET, FILM COATED ORAL at 10:24

## 2025-03-03 RX ADMIN — PREDNISONE 40 MG: 20 TABLET ORAL at 10:24

## 2025-03-03 RX ADMIN — METOPROLOL TARTRATE 6.25 MG: 25 TABLET, FILM COATED ORAL at 19:56

## 2025-03-03 RX ADMIN — FUROSEMIDE 40 MG: 10 INJECTION, SOLUTION INTRAMUSCULAR; INTRAVENOUS at 12:34

## 2025-03-03 RX ADMIN — AZITHROMYCIN DIHYDRATE 250 MG: 250 TABLET ORAL at 10:24

## 2025-03-03 ASSESSMENT — ACTIVITIES OF DAILY LIVING (ADL)
ADLS_ACUITY_SCORE: 53
ADLS_ACUITY_SCORE: 47
ADLS_ACUITY_SCORE: 47
ADLS_ACUITY_SCORE: 53
ADLS_ACUITY_SCORE: 51
ADLS_ACUITY_SCORE: 47
ADLS_ACUITY_SCORE: 53
ADLS_ACUITY_SCORE: 47
ADLS_ACUITY_SCORE: 51
ADLS_ACUITY_SCORE: 47
ADLS_ACUITY_SCORE: 53
ADLS_ACUITY_SCORE: 51
ADLS_ACUITY_SCORE: 47
ADLS_ACUITY_SCORE: 47
ADLS_ACUITY_SCORE: 53
ADLS_ACUITY_SCORE: 53
ADLS_ACUITY_SCORE: 47
ADLS_ACUITY_SCORE: 53

## 2025-03-03 NOTE — PLAN OF CARE
Goal Outcome Evaluation:      Plan of Care Reviewed With: patient    Overall Patient Progress: no changeOverall Patient Progress: no change    Outcome Evaluation: Pt AOx4. Denies pain. Limited mobility due to reduced strength and dyspnea with exertion. Essentially only able to stand at side of bed with PT this morning put on 2L o2. O2 stats stable with rest 90s on RA, 97 on 1L. As low as 84-87 on RA with min exert, recovers to 92% on 2L. PT recommending TCU. Pt on PO zithromax, but still IV rocephin. Repositioning in bed. External cath.[facility] BM. Port accessed -  now follow ABX, delegated to oncoming RN to hep lock when possible.      Problem: Adult Inpatient Plan of Care  Goal: Plan of Care Review  Description: The Plan of Care Review/Shift note should be completed every shift.  The Outcome Evaluation is a brief statement about your assessment that the patient is improving, declining, or no change.  This information will be displayed automatically on your shift  note.  3/2/2025 1919 by Solitario Wren RN  Outcome: Not Progressing  Flowsheets (Taken 3/2/2025 1918)  Outcome Evaluation: Pt AOx4. Denies pain. Limited mobility due to reduced strength and dyspnea with exertion. Essentially only able to stand at side of bed with PT this morning put on 2L o2. O2 stats stable with rest 90s on RA, 97 on 1L. As low as 84-87 on RA with min exert, recovers to 92% on 2L. PT recommending TCU. Pt on PO zithromax, but still IV rocephin. Repositioning in bed. External cath.[facility] BM. Port accessed - SL now follow ABX, delegated to oncoming RN to hep lock when possible.  Plan of Care Reviewed With: patient  Overall Patient Progress: no change  3/2/2025 1522 by Solitario Wren, RN  Outcome: Not Progressing  Flowsheets (Taken 3/2/2025 1522)  Outcome Evaluation: Pt AOx4. Denies pain. Limited mobility due to reduced strength and dyspnea with exertion. Essentially only able to stand at side of bed with PT this morning put on 2L  "o2. O2 stats stable with rest 90s on RA, 97 on 1L. As low as 84-87 on RA with min exert, recovers to 92% on 2L. PT recommending TCU. ABX switched to PO this morning and were administered. Repositioning in bed. External cath. Pt states no BM since thursday, stool softner given this afternoon.  Plan of Care Reviewed With: patient  Overall Patient Progress: declining  Goal: Patient-Specific Goal (Individualized)  Description: You can add care plan individualizations to a care plan. Examples of Individualization might be:  \"Parent requests to be called daily at 9am for status\", \"I have a hard time hearing out of my right ear\", or \"Do not touch me to wake me up as it startles  me\".  3/2/2025 1919 by Solitario Wren RN  Outcome: Not Progressing  3/2/2025 1522 by Solitario Wren RN  Outcome: Not Progressing  Goal: Absence of Hospital-Acquired Illness or Injury  3/2/2025 1919 by oSlitario Wren RN  Outcome: Not Progressing  3/2/2025 1522 by Solitario Wren RN  Outcome: Not Progressing  Intervention: Identify and Manage Fall Risk  Recent Flowsheet Documentation  Taken 3/2/2025 1123 by Solitario Wren RN  Safety Promotion/Fall Prevention: activity supervised  Intervention: Prevent Infection  Recent Flowsheet Documentation  Taken 3/2/2025 1123 by Solitario Wren RN  Infection Prevention: rest/sleep promoted  Goal: Optimal Comfort and Wellbeing  3/2/2025 1919 by Solitario Wren RN  Outcome: Not Progressing  3/2/2025 1522 by Solitario Wren RN  Outcome: Not Progressing  Goal: Readiness for Transition of Care  3/2/2025 1919 by Solitario Wren RN  Outcome: Not Progressing  3/2/2025 1522 by Solitario Wren RN  Outcome: Not Progressing     Problem: Skin Injury Risk Increased  Goal: Skin Health and Integrity  3/2/2025 1919 by Solitario Wren RN  Outcome: Not Progressing  3/2/2025 1522 by Solitario Wren RN  Outcome: Not Progressing  Intervention: Plan: Nurse Driven Intervention: Moisture Management  Recent Flowsheet " Documentation  Taken 3/2/2025 1123 by Solitario Wren RN  Moisture Interventions:   Incontinence pad   Urinary collection device  Intervention: Plan: Nurse Driven Intervention: Friction and Shear  Recent Flowsheet Documentation  Taken 3/2/2025 1123 by Solitario Wren RN  Friction/Shear Interventions: HOB 30 degrees or less     Problem: Infection  Goal: Absence of Infection Signs and Symptoms  3/2/2025 1919 by Solitario Wren, RN  Outcome: Not Progressing  3/2/2025 1522 by Solitario Wren, RN  Outcome: Not Progressing

## 2025-03-03 NOTE — PLAN OF CARE
"Pt alert and oriented x 4. Denies pain. On 1L nasal canula. Dyspnea with exertion noted. External catheter in place, voiding adequately. No BM this shift. Frequently repositioned, assist x 2. Port heparin locked.     Problem: Adult Inpatient Plan of Care  Goal: Plan of Care Review  Description: The Plan of Care Review/Shift note should be completed every shift.  The Outcome Evaluation is a brief statement about your assessment that the patient is improving, declining, or no change.  This information will be displayed automatically on your shift  note.  Outcome: Progressing  Flowsheets (Taken 3/3/2025 0654)  Outcome Evaluation: Pt alert and oriented x 4. Denies pain. On 1L nasal canula. Dyspnea with exerrtion noted. External catheter in place, voiding adequately. No BM this shift. Port heparin locked.  Plan of Care Reviewed With: patient  Overall Patient Progress: no change  Goal: Patient-Specific Goal (Individualized)  Description: You can add care plan individualizations to a care plan. Examples of Individualization might be:  \"Parent requests to be called daily at 9am for status\", \"I have a hard time hearing out of my right ear\", or \"Do not touch me to wake me up as it startles  me\".  Outcome: Progressing  Goal: Absence of Hospital-Acquired Illness or Injury  Outcome: Progressing  Goal: Optimal Comfort and Wellbeing  Outcome: Progressing  Goal: Readiness for Transition of Care  Outcome: Progressing     Problem: Skin Injury Risk Increased  Goal: Skin Health and Integrity  Outcome: Progressing  Intervention: Plan: Nurse Driven Intervention: Moisture Management  Recent Flowsheet Documentation  Taken 3/2/2025 2000 by Maeve Eric, RN  Moisture Interventions:   Incontinence pad   Urinary collection device  Bathing/Skin Care: incontinence care     Problem: Infection  Goal: Absence of Infection Signs and Symptoms  Outcome: Progressing     Problem: Fall Injury Risk  Goal: Absence of Fall and Fall-Related " Injury  Outcome: Progressing   Goal Outcome Evaluation:      Plan of Care Reviewed With: patient    Overall Patient Progress: no changeOverall Patient Progress: no change    Outcome Evaluation: Pt alert and oriented x 4. Denies pain. On 1L nasal canula. Dyspnea with exerrtion noted. External catheter in place, voiding adequately. No BM this shift. Port heparin locked.

## 2025-03-03 NOTE — PROGRESS NOTES
Care Management Follow Up    Length of Stay (days): 3    Expected Discharge Date: 03/02/2025     Concerns to be Addressed:       Patient plan of care discussed at interdisciplinary rounds: Yes    Anticipated Discharge Disposition: Home              Anticipated Discharge Services:    Anticipated Discharge DME:      Patient/family educated on Medicare website which has current facility and service quality ratings:    Education Provided on the Discharge Plan:    Patient/Family in Agreement with the Plan:      Referrals Placed by CM/SW:    Private pay costs discussed: private room/amenity fees and transportation costs    Discussed  Partnership in Safe Discharge Planning  document with patient/family: No     Handoff Completed: No, handoff not indicated or clinically appropriate    Additional Information:  SW met with patient to update her on the recommendation for TCU & where referrals were sent per discussion with her daughter on 3/2/25. Patient is aware TCU referrals are pending for [facility], [facility]. Patient requested a referral also be sent to [facility]. She also voiced a preference for a private room & voiced agreement for a private room fee that would not be covered by insurance.     Next Steps: SW will send additional referral to [facility] TCU & continue to follow to assist with discharge planning.     LUIGI Delcid

## 2025-03-03 NOTE — PROGRESS NOTES
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 (1% blast present and stable)  -Receiving Luspatercept every 3 weeks  -Most recently possible increase in blasts, peripheral smear performed as outpatient, shows minimal increase in blasts but lower counts.     PLAN:  -Continue transfusion support  -Luspatercept  -No plans for chemotherapy or aggressive cares  -Kayley has previously indicated that she would like to stop all cares.  However her daughter was not comfortable with this.  Her daughter is currently out of state and only back in May.  She is not certain she will do anything till her daughter returns in person     2. GI bleed  -Dark stool was reported in the ER, stool occult positive.  Currently no active bleeding  -GI has been consulted in the past also and no scope recommended     PLAN:  - GI keeping age and goals in mind would not recommend endoscopy at this point  -Continue Protonix     3.Pancytopenia related to MDS      PLAN:  -ANC is low, no evidence of neutropenic symptoms, antibiotics if neutropenic fever  -PRBC if hemoglobin less than 7  -PLT transfusion if less than 10 or actively bleeding     4. DNR/ DNI     Discharge home or back to living facility tomorrow if counts stable.  See above for discussions of goals of care, patient feels she can only do this discussion when her daughter returns.  She also feels she is not certain she will continue any kind of treatments    Subjective:    Does not recall any recent dark stool, RN could not clarify the same.  Denies any GI, , skin Schull, CNS symptoms.  All aspects of history unchanged updated as appropriate      Labs:  All labs reviewed    CBC  Recent Labs   Lab 03/03/25  1237 03/03/25  0646 03/02/25  0605 03/01/25  0621   WBC  --  1.3*  1.4* 1.4* 2.9*   HGB 8.7* 6.5* 8.0* 8.5*   MCV  --  106* 105* 107*  "  PLT  --  21* 22* 22*       CMP  Recent Labs   Lab 03/03/25  0646 03/02/25  0605 03/01/25  1255 02/28/25  0143   * 135 132* 132*   POTASSIUM 4.2  --   --  4.7   CHLORIDE 100  --   --  99   CO2 21*  --   --  21*   ANIONGAP 8  --   --  12   *  --   --  112*   BUN 40.3*  --   --  56.8*   CR 1.25* 1.06* 1.03* 1.29*   GFRESTIMATED 40* 49* 51* 39*   NATALIA 8.7*  --   --  9.3       INR  Recent Labs   Lab 02/28/25  0143   INR 1.16*       Blood CultureNo results for input(s): \"CULT\" in the last 168 hours.      Vince Norton MD  [facility]  3/3/2025 4:37 PM     Total time 52 minutes, 25 minutes and remainder of time reviewing chart, documentation, entry  "

## 2025-03-03 NOTE — PROGRESS NOTES
[facility]    PROGRESS NOTE - Hospitalist Service    ASSESSMENT AND PLAN     Active Problems:    Thrombocytopenia    Lower GI bleed    Myelodysplastic syndrome (H)    Anemia due to blood loss, acute    Kayley Putnam is a 89 year old female with a past medical history significant for MDS complicated by pancytopenia, breast cancer, severe mitral regurgitation, atrial fibrillation, not on anticoagulation, CKD, chronic hyponatremia who presented to the ER with complaint of bright red blood per rectum and was found to have acute on chronic anemia.      Recurrent rectal bleeding  Acute on chronic anemia/ Acute on Chronic Blood Loss Anemia   Received 1 unit of platelet and 1 unit of PRBCs in the ER.     No further rectal bleeding since arrival to the hospital  Admitted for similar events 1/2025 - at that time GI felt that she was not a safe candidate for endoscopies.  GI again consulted this admission and is again recommending supportive care  Hgb initially improved to 8.5 >> 8. HGB today 6.5   1 unit PRBC ordered for 3/3/25. Plan to administer Lasix following transfusion    Neutropenia without fever  Antibiotics discontinued as her clinical picture appears more consistent with fluid overload  Monitor and add antibiotics if needed.     Pancytopenia  Myelodysplastic syndrome  Breast cancer  Follows [facility] outpatient (Dr. Norton)  Patient is receiving Luspatercept every 3 weeks.  Chronic  pancytopenia in the setting of progressing MDS.    Since admission did receive 2 unit PRBC, 1 unit platelets  Platelets at baseline (was 21 at time of discharge in 1/2025)    Acute hypoxic respiratory failure  Acute on chronic heart failure exacerbation  Complicated by severe mitral regurgitation  CXR shows bilateral bronchial wall thickening with mid and lower lung predominant reticulonodular opacities.    Azithromycin and ceftriaxone were initiated-discontinued today.  Does not appear consistent with  pneumonia.    Received 10 mg IV Lasix 3/1  Plan for Lasix 40 mg IV push following transfusion today  Wean oxygen as able  Procalcitonin 0.40.  BNP 3539.  ECHO reviewed from 1/14/2025 with EF of 60 to 65%.  Possible torn chordae.  Severe 4+ mitral regurgitation.  Moderate to severe 2-3+ tricuspid regurgitation.  Repeat dose of Lasix as needed.    Possible acute gouty flare?  Pain with palpation to anterior left ankle.  Prednisone for 5 days     Severe mitral regurgitation secondary to possible torn chordae  During last hospitalization echocardiogram revealed severe mitral regurgitation with possible torn chordae.  Given her advanced age and pancytopenia she was not a surgical candidate and mitral valve was not favorable for MitraClip given significant mitral calcification and mild stenosis.    Cardiology recommended medical therapy with rate control of her A-fib and diuretics.   - continue home metoprolol 6.25 mg twice daily with holding parameters     Atrial fibrillation, chronic  - Not on anticoagulation in the setting of pancytopenia  - tolerating PTA coreg 6.25mg po bid at this time     Chronic hyponatremia  Sodium 129     CKD stage IIIb  Awaiting am creat     Barriers to discharge: Hypoxia, IV diuresis, hemoglobin stabilization    Anticipated length of stay: 1-2 days     Medically Ready for Discharge: Anticipated Tomorrow    Clinically Significant Risk Factors         # Hyponatremia: Lowest Na = 129 mmol/L in last 2 days, will monitor as appropriate         # Thrombocytopenia: Lowest platelets = 21 in last 2 days, will monitor for bleeding   # Hypertension: Noted on problem list                         Subjective:  Patient resting comfortably in bed.  Notes some difficulty breathing.  Denies any bleeding.  Complaining of pain to the top of her left foot.      PHYSICAL EXAM  Temp:  [98.7  F (37.1  C)-99.8  F (37.7  C)] 98.9  F (37.2  C)  Pulse:  [50-88] 65  Resp:  [16-18] 16  BP: (103-129)/(42-76) 118/59  SpO2:   [94 %-98 %] 94 %  Wt Readings from Last 1 Encounters:   02/03/25 61.2 kg (135 lb)       Intake/Output Summary (Last 24 hours) at 3/3/2025 1212  Last data filed at 3/3/2025 1145  Gross per 24 hour   Intake 360 ml   Output 450 ml   Net -90 ml      There is no height or weight on file to calculate BMI.    GENRL: Alert and answering questions appropriately. Not in acute distress. Lying in bed   CHEST: Clear to auscultation bilaterally. Breathing easily   HEART: Regular rate. +murmur   EXTRM: No pedal edema  NEURO: No focal neurological deficit. No involuntary movements. Normal mentation  PSYCH: Normal affect and mood.   INTGM: No skin rash    Medical Decision Making       55 MINUTES SPENT BY ME on the date of service doing chart review, history, exam, documentation & further activities per the note.      PERTINENT LABS/IMAGING:  Results for orders placed or performed during the hospital encounter of 02/28/25   XR Chest 2 Views    Impression    IMPRESSION: Left IJ Port-A-Cath tip in low SVC. Stable cardiomegaly. Bilateral bronchial wall thickening with mid and lower lung predominant reticulonodular opacities and bibasilar airspace opacities suggesting bronchopneumonia. New small left pleural   effusion. Left breast calcifications redemonstrated.     Most Recent 3 CBC's:  Recent Labs   Lab Test 03/03/25  0646 03/02/25  0605 03/01/25  0621   WBC 1.3*  1.4* 1.4* 2.9*   HGB 6.5* 8.0* 8.5*   * 105* 107*   PLT 21* 22* 22*     Most Recent 3 BMP's:  Recent Labs   Lab Test 03/03/25  0646 03/02/25  0605 03/01/25  1255 02/28/25  0143 02/03/25  1155   * 135 132* 132* 133*   POTASSIUM 4.2  --   --  4.7 5.1   CHLORIDE 100  --   --  99 99   CO2 21*  --   --  21* 22   BUN 40.3*  --   --  56.8* 47.1*   CR 1.25* 1.06* 1.03* 1.29* 1.42*   ANIONGAP 8  --   --  12 12   NATALIA 8.7*  --   --  9.3 9.5   *  --   --  112* 84     Most Recent 2 LFT's:  Recent Labs   Lab Test 01/15/25  0800   AST 10   ALT 9   ALKPHOS 86   BILITOTAL  "1.2       No results for input(s): \"CHOL\", \"HDL\", \"LDL\", \"TRIG\", \"CHOLHDLRATIO\" in the last 18234 hours.  No results for input(s): \"LDL\" in the last 17019 hours.  Recent Labs   Lab Test 03/03/25  0646   *   POTASSIUM 4.2   CHLORIDE 100   CO2 21*   *   BUN 40.3*   CR 1.25*   GFRESTIMATED 40*   NATALIA 8.7*     No results for input(s): \"A1C\" in the last 70684 hours.  Recent Labs   Lab Test 03/03/25  0646 03/02/25  0605 03/01/25  0621   HGB 6.5* 8.0* 8.5*     No results for input(s): \"TROPONINI\" in the last 78126 hours.  Recent Labs   Lab Test 03/03/25  0646   NTBNPI 3,539*     No results for input(s): \"TSH\" in the last 94954 hours.  Recent Labs   Lab Test 02/28/25  0143 01/14/25  1555 11/22/24  1136   INR 1.16* 1.23* 1.14       Hilda Albert, DO  Hospitalist Service  [facility]      "

## 2025-03-04 ENCOUNTER — APPOINTMENT (OUTPATIENT)
Dept: PHYSICAL THERAPY | Facility: CLINIC | Age: OVER 89
DRG: 377 | End: 2025-03-04
Payer: MEDICARE

## 2025-03-04 LAB
ANION GAP SERPL CALCULATED.3IONS-SCNC: 11 MMOL/L (ref 7–15)
BASOPHILS # BLD MANUAL: 0 10E3/UL (ref 0–0.2)
BASOPHILS NFR BLD MANUAL: 0 %
BUN SERPL-MCNC: 49.6 MG/DL (ref 8–23)
CALCIUM SERPL-MCNC: 8.7 MG/DL (ref 8.8–10.4)
CHLORIDE SERPL-SCNC: 100 MMOL/L (ref 98–107)
CREAT SERPL-MCNC: 1.22 MG/DL (ref 0.51–0.95)
EGFRCR SERPLBLD CKD-EPI 2021: 42 ML/MIN/1.73M2
ELLIPTOCYTES BLD QL SMEAR: SLIGHT
EOSINOPHIL # BLD MANUAL: 0 10E3/UL (ref 0–0.7)
EOSINOPHIL NFR BLD MANUAL: 0 %
ERYTHROCYTE [DISTWIDTH] IN BLOOD BY AUTOMATED COUNT: 25.6 % (ref 10–15)
FRAGMENTS BLD QL SMEAR: SLIGHT
GLUCOSE SERPL-MCNC: 102 MG/DL (ref 70–99)
HCO3 SERPL-SCNC: 21 MMOL/L (ref 22–29)
HCT VFR BLD AUTO: 22 % (ref 35–47)
HGB BLD-MCNC: 7.4 G/DL (ref 11.7–15.7)
LYMPHOCYTES # BLD MANUAL: 0.3 10E3/UL (ref 0.8–5.3)
LYMPHOCYTES NFR BLD MANUAL: 23 %
MCH RBC QN AUTO: 34.4 PG (ref 26.5–33)
MCHC RBC AUTO-ENTMCNC: 33.6 G/DL (ref 31.5–36.5)
MCV RBC AUTO: 102 FL (ref 78–100)
MONOCYTES # BLD MANUAL: 0.1 10E3/UL (ref 0–1.3)
MONOCYTES NFR BLD MANUAL: 8 %
MYELOCYTES # BLD MANUAL: 0.1 10E3/UL
MYELOCYTES NFR BLD MANUAL: 4 %
NEUTROPHILS # BLD MANUAL: 1 10E3/UL (ref 1.6–8.3)
NEUTROPHILS NFR BLD MANUAL: 65 %
NRBC # BLD AUTO: 0 10E3/UL
NRBC BLD MANUAL-RTO: 1 %
PLAT MORPH BLD: ABNORMAL
PLATELET # BLD AUTO: 23 10E3/UL (ref 150–450)
POTASSIUM SERPL-SCNC: 3.8 MMOL/L (ref 3.4–5.3)
RBC # BLD AUTO: 2.15 10E6/UL (ref 3.8–5.2)
RBC MORPH BLD: ABNORMAL
SODIUM SERPL-SCNC: 132 MMOL/L (ref 135–145)
WBC # BLD AUTO: 1.5 10E3/UL (ref 4–11)

## 2025-03-04 PROCEDURE — 80048 BASIC METABOLIC PNL TOTAL CA: CPT | Performed by: INTERNAL MEDICINE

## 2025-03-04 PROCEDURE — 250N000012 HC RX MED GY IP 250 OP 636 PS 637: Performed by: INTERNAL MEDICINE

## 2025-03-04 PROCEDURE — 250N000011 HC RX IP 250 OP 636: Performed by: INTERNAL MEDICINE

## 2025-03-04 PROCEDURE — 120N000001 HC R&B MED SURG/OB

## 2025-03-04 PROCEDURE — 97530 THERAPEUTIC ACTIVITIES: CPT | Mod: GP | Performed by: PHYSICAL THERAPIST

## 2025-03-04 PROCEDURE — 85007 BL SMEAR W/DIFF WBC COUNT: CPT | Performed by: INTERNAL MEDICINE

## 2025-03-04 PROCEDURE — 85027 COMPLETE CBC AUTOMATED: CPT | Performed by: INTERNAL MEDICINE

## 2025-03-04 PROCEDURE — 99232 SBSQ HOSP IP/OBS MODERATE 35: CPT | Performed by: INTERNAL MEDICINE

## 2025-03-04 PROCEDURE — 250N000013 HC RX MED GY IP 250 OP 250 PS 637: Performed by: STUDENT IN AN ORGANIZED HEALTH CARE EDUCATION/TRAINING PROGRAM

## 2025-03-04 RX ADMIN — Medication 5 ML: at 07:01

## 2025-03-04 RX ADMIN — Medication 5 ML: at 09:31

## 2025-03-04 RX ADMIN — PREDNISONE 40 MG: 20 TABLET ORAL at 09:25

## 2025-03-04 RX ADMIN — METOPROLOL TARTRATE 6.25 MG: 25 TABLET, FILM COATED ORAL at 20:43

## 2025-03-04 ASSESSMENT — ACTIVITIES OF DAILY LIVING (ADL)
ADLS_ACUITY_SCORE: 58
ADLS_ACUITY_SCORE: 53
ADLS_ACUITY_SCORE: 53
ADLS_ACUITY_SCORE: 58
ADLS_ACUITY_SCORE: 53
ADLS_ACUITY_SCORE: 53
ADLS_ACUITY_SCORE: 58
ADLS_ACUITY_SCORE: 53
ADLS_ACUITY_SCORE: 58
ADLS_ACUITY_SCORE: 57
ADLS_ACUITY_SCORE: 53
ADLS_ACUITY_SCORE: 53
ADLS_ACUITY_SCORE: 58
ADLS_ACUITY_SCORE: 53
ADLS_ACUITY_SCORE: 58
ADLS_ACUITY_SCORE: 53
ADLS_ACUITY_SCORE: 58
ADLS_ACUITY_SCORE: 53
ADLS_ACUITY_SCORE: 58

## 2025-03-04 NOTE — PLAN OF CARE
"/51 (BP Location: Left arm)   Pulse 67   Temp 98.5  F (36.9  C) (Oral)   Resp 18   SpO2 95%     Neuro: a/o x4  Cardiac: WNL  Lungs: WNL  GI: WNL  : purewick  Pain: L foot pain when weight bearing   IV: PORT- heparin locked  Meds: prednisone   Labs/tests: Hgb 7.4, plt 23  Diet: low fiber  Activity: assist x1/gb/walker  Misc: PT/oncology/gastroenterology following.   Plan: waiting on placement. Currently resting in bed, able to make need known.           Problem: Adult Inpatient Plan of Care  Goal: Plan of Care Review  Description: The Plan of Care Review/Shift note should be completed every shift.  The Outcome Evaluation is a brief statement about your assessment that the patient is improving, declining, or no change.  This information will be displayed automatically on your shift  note.  Outcome: Progressing  Flowsheets (Taken 3/4/2025 0683)  Outcome Evaluation: waiting on placement  Plan of Care Reviewed With: patient  Overall Patient Progress: improving  Goal: Patient-Specific Goal (Individualized)  Description: You can add care plan individualizations to a care plan. Examples of Individualization might be:  \"Parent requests to be called daily at 9am for status\", \"I have a hard time hearing out of my right ear\", or \"Do not touch me to wake me up as it startles  me\".  Outcome: Progressing  Goal: Absence of Hospital-Acquired Illness or Injury  Outcome: Progressing  Intervention: Identify and Manage Fall Risk  Recent Flowsheet Documentation  Taken 3/4/2025 0928 by Magdalene Lazcano RN  Safety Promotion/Fall Prevention:   activity supervised   assistive device/personal items within reach   clutter free environment maintained   nonskid shoes/slippers when out of bed   safety round/check completed  Intervention: Prevent Skin Injury  Recent Flowsheet Documentation  Taken 3/4/2025 0928 by Magdalene Lazcano RN  Body Position: position changed independently  Intervention: Prevent and Manage VTE (Venous " Thromboembolism) Risk  Recent Flowsheet Documentation  Taken 3/4/2025 0928 by Magdalene Lazcano RN  VTE Prevention/Management: SCDs off (sequential compression devices)  Intervention: Prevent Infection  Recent Flowsheet Documentation  Taken 3/4/2025 0928 by Magdalene Lazcano RN  Infection Prevention:   hand hygiene promoted   single patient room provided   rest/sleep promoted  Goal: Optimal Comfort and Wellbeing  Outcome: Progressing  Goal: Readiness for Transition of Care  Outcome: Progressing     Problem: Skin Injury Risk Increased  Goal: Skin Health and Integrity  Outcome: Progressing  Intervention: Plan: Nurse Driven Intervention: Moisture Management  Recent Flowsheet Documentation  Taken 3/4/2025 0928 by Magdalene Lazcano RN  Moisture Interventions:   Encourage regular toileting   Incontinence pad  Intervention: Plan: Nurse Driven Intervention: Friction and Shear  Recent Flowsheet Documentation  Taken 3/4/2025 0928 by Magdalene Lazcano RN  Friction/Shear Interventions: HOB 30 degrees or less  Intervention: Optimize Skin Protection  Recent Flowsheet Documentation  Taken 3/4/2025 0928 by Magdalene Lazcano RN  Activity Management: activity adjusted per tolerance  Head of Bed (HOB) Positioning: HOB at 20-30 degrees     Problem: Infection  Goal: Absence of Infection Signs and Symptoms  Outcome: Progressing     Problem: Fall Injury Risk  Goal: Absence of Fall and Fall-Related Injury  Outcome: Progressing  Intervention: Identify and Manage Contributors  Recent Flowsheet Documentation  Taken 3/4/2025 0928 by Magdalene Lazcano RN  Medication Review/Management: medications reviewed  Intervention: Promote Injury-Free Environment  Recent Flowsheet Documentation  Taken 3/4/2025 0928 by Magdalene Lazcano RN  Safety Promotion/Fall Prevention:   activity supervised   assistive device/personal items within reach   clutter free environment maintained   nonskid shoes/slippers when out of bed   safety round/check completed         Goal Outcome  Evaluation:      Plan of Care Reviewed With: patient    Overall Patient Progress: improvingOverall Patient Progress: improving    Outcome Evaluation: waiting on placement

## 2025-03-04 NOTE — PLAN OF CARE
"Received a unit of blood for a hg of 6.5, up to 8.7 after transfusion. Up in the chair and ambulating in her room with A1 walker GB, good appetite, voiding adequate amounts.   Problem: Adult Inpatient Plan of Care  Goal: Plan of Care Review  Description: The Plan of Care Review/Shift note should be completed every shift.  The Outcome Evaluation is a brief statement about your assessment that the patient is improving, declining, or no change.  This information will be displayed automatically on your shift  note.  Outcome: Progressing  Flowsheets (Taken 3/3/2025 1804)  Plan of Care Reviewed With: patient  Goal: Patient-Specific Goal (Individualized)  Description: You can add care plan individualizations to a care plan. Examples of Individualization might be:  \"Parent requests to be called daily at 9am for status\", \"I have a hard time hearing out of my right ear\", or \"Do not touch me to wake me up as it startles  me\".  Outcome: Progressing  Goal: Absence of Hospital-Acquired Illness or Injury  Outcome: Progressing  Intervention: Prevent Skin Injury  Recent Flowsheet Documentation  Taken 3/3/2025 0856 by Shaggy Izaguirre RN  Body Position: turned  Goal: Optimal Comfort and Wellbeing  Outcome: Progressing  Goal: Readiness for Transition of Care  Outcome: Progressing     Problem: Skin Injury Risk Increased  Goal: Skin Health and Integrity  Outcome: Progressing  Intervention: Plan: Nurse Driven Intervention: Moisture Management  Recent Flowsheet Documentation  Taken 3/3/2025 0856 by Shaggy Izaguirre RN  Moisture Interventions:   Encourage regular toileting   Urinary collection device  Intervention: Optimize Skin Protection  Recent Flowsheet Documentation  Taken 3/3/2025 0856 by Shaggy Izaguirre RN  Activity Management: activity adjusted per tolerance  Head of Bed (HOB) Positioning: HOB at 20-30 degrees     Problem: Infection  Goal: Absence of Infection Signs and Symptoms  Outcome: Progressing     Problem: Fall Injury " Risk  Goal: Absence of Fall and Fall-Related Injury  Outcome: Progressing  Intervention: Identify and Manage Contributors  Recent Flowsheet Documentation  Taken 3/3/2025 6856 by Shaggy Izaguirre RN  Self-Care Promotion: independence encouraged  Medication Review/Management: medications reviewed   Goal Outcome Evaluation:      Plan of Care Reviewed With: patient

## 2025-03-04 NOTE — PLAN OF CARE
"  Problem: Adult Inpatient Plan of Care  Goal: Plan of Care Review  Description: The Plan of Care Review/Shift note should be completed every shift.  The Outcome Evaluation is a brief statement about your assessment that the patient is improving, declining, or no change.  This information will be displayed automatically on your shift  note.  Outcome: Progressing  Flowsheets (Taken 3/4/2025 0628)  Outcome Evaluation: Denied any pain, purewick in place.  Plan of Care Reviewed With: patient  Overall Patient Progress: no change  Goal: Patient-Specific Goal (Individualized)  Description: You can add care plan individualizations to a care plan. Examples of Individualization might be:  \"Parent requests to be called daily at 9am for status\", \"I have a hard time hearing out of my right ear\", or \"Do not touch me to wake me up as it startles  me\".  Outcome: Progressing  Goal: Absence of Hospital-Acquired Illness or Injury  Outcome: Progressing  Intervention: Identify and Manage Fall Risk  Recent Flowsheet Documentation  Taken 3/3/2025 2020 by Melba Chacko RN  Safety Promotion/Fall Prevention: safety round/check completed  Intervention: Prevent Infection  Recent Flowsheet Documentation  Taken 3/3/2025 2020 by Melba Chacko RN  Infection Prevention:   hand hygiene promoted   single patient room provided   rest/sleep promoted  Goal: Optimal Comfort and Wellbeing  Outcome: Progressing  Goal: Readiness for Transition of Care  Outcome: Progressing     Problem: Skin Injury Risk Increased  Goal: Skin Health and Integrity  Outcome: Progressing  Intervention: Plan: Nurse Driven Intervention: Moisture Management  Recent Flowsheet Documentation  Taken 3/3/2025 2020 by Melba Chacko RN  Moisture Interventions:   No brief in bed   Incontinence pad  Taken 3/3/2025 2000 by Melba Chacko RN  Moisture Interventions:   No brief in bed   Incontinence pad  Bathing/Skin Care: incontinence care  Intervention: Plan: Nurse Driven Intervention: " Friction and Shear  Recent Flowsheet Documentation  Taken 3/3/2025 2020 by Melba Chacko RN  Friction/Shear Interventions: HOB 30 degrees or less  Intervention: Optimize Skin Protection  Recent Flowsheet Documentation  Taken 3/3/2025 2020 by Melba Chacko RN  Activity Management:   activity adjusted per tolerance   up to bedside commode   up in chair     Problem: Infection  Goal: Absence of Infection Signs and Symptoms  Outcome: Progressing     Problem: Fall Injury Risk  Goal: Absence of Fall and Fall-Related Injury  Outcome: Progressing  Intervention: Identify and Manage Contributors  Recent Flowsheet Documentation  Taken 3/3/2025 2020 by Melba Chacko RN  Medication Review/Management: medications reviewed  Intervention: Promote Injury-Free Environment  Recent Flowsheet Documentation  Taken 3/3/2025 2020 by Melba Chacko RN  Safety Promotion/Fall Prevention: safety round/check completed   Goal Outcome Evaluation:      Plan of Care Reviewed With: patient    Overall Patient Progress: no changeOverall Patient Progress: no change    Outcome Evaluation: Denied any pain, purewick in place.

## 2025-03-04 NOTE — PROGRESS NOTES
Care Management Follow Up    Length of Stay (days): 4    Expected Discharge Date: 03/02/2025     Concerns to be Addressed:       Patient plan of care discussed at interdisciplinary rounds: Yes    Anticipated Discharge Disposition: Home        Referrals Placed by CM/DOMINIQUE:    Private pay costs discussed: transportation costs    Discussed  Partnership in Safe Discharge Planning  document with patient/family: No     Handoff Completed: No, handoff not indicated or clinically appropriate    Additional Information:  SW met with patient to give her an update on finding TCU placement. Patient voiced her & her daughter spoke last evening & she has decided she will return to [facility] with assisted living services at discharge. Patient requested pending TCU referrals be cancelled. She declined DOMINIQUE's offer to set up home therapy services for her stating she would prefer to arrange that after discharge once she is feeling up to working with PT again. Patient voiced her daughter plans to contact [facility] today to discuss the increased services & arrange everything for her. She gave permission for SW to contact [facility] to discuss discharge planning. SW left a voicemail for [facility] nursing office ([phone]) & requested a return phone call to discuss discharge planning. Patient confirmed she will need wheelchair transportation arranged upon discharge & is aware this may be private pay.     Next Steps: SW awaits a return phone call from [facility] & continues to follow to assist with discharge planning.     LUIGI Delcid spoke via telephone with patient's daughter, Winston to update her on the above discharge plan & update her on patient's current level of assistance. Winston shared she would prefer patient go to TCU as recommended & will speak with patient about this today. She stated she has not been in contact with [facility] yet about increasing services but did tell patient she would need  time to do this. Winston is aware patient would prefer returning to her apartment with increased services but voiced uncertainty if [facility] would be able to provide patient's current level of assistance. Scarlet updated that a voicemail was left for [facility] nursing to discuss discharge planning & is aware  is waiting for a return phone call.  to provide Winston with an update on discharge planning on 3/5 per her request.     Katelyn Samuels, [facility]  3/4/2025  3:05 PM     Patient is now agreeable to TCU placement again. She would like referrals re-sent to [facility]. Patient prefers a private room and is aware of the potential private room fee. SCARLET re-sent referrals & will continue to follow to assist with discharge planning.     Katelyn Samuels, [facility]  3/4/2025  3:20 PM

## 2025-03-04 NOTE — PROGRESS NOTES
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 (1% blast present and stable)  -Receiving Luspatercept every 3 weeks  -Most recently possible increase in blasts, peripheral smear performed as outpatient, shows minimal increase in blasts but lower counts.     PLAN:  -Continue transfusion support  -Luspatercept  -No plans for chemotherapy or aggressive cares  -Kayley has previously indicated that she would like to stop all cares.  However her daughter was not comfortable with this.  Her daughter is currently out of state and only back in May.  She is not certain she will do anything till her daughter returns in person     2. GI bleed  -Dark stool was reported in the ER, stool occult positive.  Currently no active bleeding  -GI has been consulted in the past also and no scope recommended     PLAN:  - GI keeping age and goals in mind would not recommend endoscopy at this point  -Continue Protonix     3.Pancytopenia related to MDS      PLAN:  -ANC is low, no evidence of neutropenic symptoms, antibiotics if neutropenic fever  -PRBC if hemoglobin less than 7  -PLT transfusion if less than 10 or actively bleeding     4. DNR/ DNI     Discharge home or back to living facility tomorrow if counts stable.  See above for discussions of goals of care, patient feels she can only do this discussion when her daughter returns.  She also feels she is not certain she will continue any kind of treatments  Subjective:    No new symptoms, , stool stable      Labs:  All labs reviewed    CBC  Recent Labs   Lab 03/04/25  0700 03/03/25  1237 03/03/25  0646 03/02/25  0605   WBC 1.5*  --  1.3*  1.4* 1.4*   HGB 7.4* 8.7* 6.5* 8.0*   *  --  106* 105*   PLT 23*  --  21* 22*       CMP  Recent Labs   Lab 03/04/25  0700 03/03/25  0646 03/02/25  0605 03/01/25  1255 02/28/25  0143   * 129*  "135 132* 132*   POTASSIUM 3.8 4.2  --   --  4.7   CHLORIDE 100 100  --   --  99   CO2 21* 21*  --   --  21*   ANIONGAP 11 8  --   --  12   * 105*  --   --  112*   BUN 49.6* 40.3*  --   --  56.8*   CR 1.22* 1.25* 1.06* 1.03* 1.29*   GFRESTIMATED 42* 40* 49* 51* 39*   NATALIA 8.7* 8.7*  --   --  9.3       INR  Recent Labs   Lab 02/28/25  0143   INR 1.16*       Blood CultureNo results for input(s): \"CULT\" in the last 168 hours.      Vince Norton MD  [facility]  3/4/2025 5:31 PM       "

## 2025-03-04 NOTE — PROGRESS NOTES
[facility]    PROGRESS NOTE - Hospitalist Service    ASSESSMENT AND PLAN     Active Problems:    Thrombocytopenia    Lower GI bleed    Myelodysplastic syndrome (H)    Anemia due to blood loss, acute    Kayley Putnam is a 89 year old female with a past medical history significant for MDS complicated by pancytopenia, breast cancer, severe mitral regurgitation, atrial fibrillation, not on anticoagulation, CKD, chronic hyponatremia who presented to the ER with complaint of bright red blood per rectum and was found to have acute on chronic anemia.      Recurrent rectal bleeding  Acute on chronic anemia/ Acute on Chronic Blood Loss Anemia   -Received 1 unit of platelet and 1 unit of PRBCs in the ER.     -GI felt that she was not a safe candidate for endoscopies.  -GI again consulted this admission and is again recommending supportive care  -Received 1 unit PRBC ordered for 3/3/25. Plan to administer Lasix following transfusion  -Will recheck CBC in am    Neutropenia without fever  -Antibiotics discontinued as her clinical picture appears more consistent with fluid overload  -Monitor and add antibiotics if needed.     Pancytopenia  Myelodysplastic syndrome  Breast cancer  Follows [facility] outpatient (Dr. Norton)  Patient is receiving Luspatercept every 3 weeks.  -Chronic  pancytopenia in the setting of progressing MDS.    -Received 2 unit PRBC, 1 unit platelets during this admission  -Hgb  7.4, Platelets 23 today  -Will recheck cbc in am     Acute hypoxic respiratory failure  Acute on chronic heart failure exacerbation  Complicated by severe mitral regurgitation  CXR shows bilateral bronchial wall thickening with mid and lower lung predominant reticulonodular opacities.    Azithromycin and ceftriaxone were initiated-discontinued today.  Does not appear consistent with pneumonia.    -Received 10 mg IV Lasix 3/1  -Plan for Lasix 40 mg IV push following transfusion today  -Wean oxygen as  able  -Procalcitonin 0.40.  BNP 3539.  -ECHO reviewed from 1/14/2025 with EF of 60 to 65%.  Possible torn chordae.  Severe 4+ mitral regurgitation.  Moderate to severe 2-3+ tricuspid regurgitation.  -Repeat dose of Lasix as needed.    Possible acute gouty flare?  -Pain with palpation to anterior left ankle.  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  Given her advanced age and pancytopenia she was not a surgical candidate and mitral valve was not favorable for MitraClip given significant mitral calcification and mild stenosis.    -Cardiology recommended medical therapy with rate control of her A-fib and diuretics.   -continue home metoprolol 6.25 mg twice daily with holding parameters     Atrial fibrillation, chronic  - Not on anticoagulation in the setting of pancytopenia  -tolerating PTA coreg 6.25mg po bid at this time     Chronic hyponatremia  Sodium 129     CKD stage IIIb  Awaiting am creat     Barriers to discharge: Hypoxia, IV diuresis, hemoglobin stabilization    Anticipated length of stay: 1-2 days     Medically Ready for Discharge: Anticipated Tomorrow       # Hyponatremia: Lowest Na = 129 mmol/L in last 2 days, will monitor as appropriate         # Thrombocytopenia: Lowest platelets = 21 in last 2 days, will monitor for bleeding   # Hypertension: Noted on problem list                     Subjective:  Patient seen and examined today. Chart reviewed. Patient stated that she is feeling okay. She denies nausea or vomiting. She has no fever. She also denies any fever.       PHYSICAL EXAM  Temp:  [97.6  F (36.4  C)-98.7  F (37.1  C)] 98  F (36.7  C)  Pulse:  [64-82] 66  Resp:  [16-18] 16  BP: (107-117)/(51-56) 117/56  SpO2:  [95 %] 95 %  Wt Readings from Last 1 Encounters:   02/03/25 61.2 kg (135 lb)       Intake/Output Summary (Last 24 hours) at 3/3/2025  "1212  Last data filed at 3/3/2025 1145  Gross per 24 hour   Intake 360 ml   Output 450 ml   Net -90 ml      There is no height or weight on file to calculate BMI.    GENRL: Alert and answering questions appropriately. Not in acute distress. Lying in bed   CHEST: Clear to auscultation bilaterally. Breathing easily   HEART: Regular rate. +murmur   EXTRM: No pedal edema  NEURO: No focal neurological deficit. No involuntary movements. Normal mentation  PSYCH: Normal affect and mood.   INTGM: No skin rash    Medical Decision Making       55 MINUTES SPENT BY ME on the date of service doing chart review, history, exam, documentation & further activities per the note.      PERTINENT LABS/IMAGING:  Results for orders placed or performed during the hospital encounter of 02/28/25   XR Chest 2 Views    Impression    IMPRESSION: Left IJ Port-A-Cath tip in low SVC. Stable cardiomegaly. Bilateral bronchial wall thickening with mid and lower lung predominant reticulonodular opacities and bibasilar airspace opacities suggesting bronchopneumonia. New small left pleural   effusion. Left breast calcifications redemonstrated.     Most Recent 3 CBC's:  Recent Labs   Lab Test 03/04/25  0700 03/03/25  1237 03/03/25  0646 03/02/25  0605   WBC 1.5*  --  1.3*  1.4* 1.4*   HGB 7.4* 8.7* 6.5* 8.0*   *  --  106* 105*   PLT 23*  --  21* 22*     Most Recent 3 BMP's:  Recent Labs   Lab Test 03/04/25  0700 03/03/25  0646 03/02/25  0605 03/01/25  1255 02/28/25  0143   * 129* 135   < > 132*   POTASSIUM 3.8 4.2  --   --  4.7   CHLORIDE 100 100  --   --  99   CO2 21* 21*  --   --  21*   BUN 49.6* 40.3*  --   --  56.8*   CR 1.22* 1.25* 1.06*   < > 1.29*   ANIONGAP 11 8  --   --  12   NATALIA 8.7* 8.7*  --   --  9.3   * 105*  --   --  112*    < > = values in this interval not displayed.     Most Recent 2 LFT's:  Recent Labs   Lab Test 01/15/25  0800   AST 10   ALT 9   ALKPHOS 86   BILITOTAL 1.2       No results for input(s): \"CHOL\", " "\"HDL\", \"LDL\", \"TRIG\", \"CHOLHDLRATIO\" in the last 58665 hours.  No results for input(s): \"LDL\" in the last 10766 hours.  Recent Labs   Lab Test 03/03/25  0646   *   POTASSIUM 4.2   CHLORIDE 100   CO2 21*   *   BUN 40.3*   CR 1.25*   GFRESTIMATED 40*   NATALIA 8.7*     No results for input(s): \"A1C\" in the last 66886 hours.  Recent Labs   Lab Test 03/03/25  0646 03/02/25  0605 03/01/25  0621   HGB 6.5* 8.0* 8.5*     No results for input(s): \"TROPONINI\" in the last 96239 hours.  Recent Labs   Lab Test 03/03/25  0646   NTBNPI 3,539*     No results for input(s): \"TSH\" in the last 62459 hours.  Recent Labs   Lab Test 02/28/25  0143 01/14/25  1555 11/22/24  1136   INR 1.16* 1.23* 1.14       Fritz Allen MD  Hospitalist Service  [facility]      "

## 2025-03-05 LAB
ANION GAP SERPL CALCULATED.3IONS-SCNC: 9 MMOL/L (ref 7–15)
BUN SERPL-MCNC: 50.2 MG/DL (ref 8–23)
CALCIUM SERPL-MCNC: 9.1 MG/DL (ref 8.8–10.4)
CHLORIDE SERPL-SCNC: 99 MMOL/L (ref 98–107)
CREAT SERPL-MCNC: 1.07 MG/DL (ref 0.51–0.95)
EGFRCR SERPLBLD CKD-EPI 2021: 49 ML/MIN/1.73M2
ERYTHROCYTE [DISTWIDTH] IN BLOOD BY AUTOMATED COUNT: 25.2 % (ref 10–15)
GLUCOSE SERPL-MCNC: 93 MG/DL (ref 70–99)
HCO3 SERPL-SCNC: 22 MMOL/L (ref 22–29)
HCT VFR BLD AUTO: 23 % (ref 35–47)
HGB BLD-MCNC: 7.8 G/DL (ref 11.7–15.7)
MCH RBC QN AUTO: 35 PG (ref 26.5–33)
MCHC RBC AUTO-ENTMCNC: 33.9 G/DL (ref 31.5–36.5)
MCV RBC AUTO: 103 FL (ref 78–100)
PLATELET # BLD AUTO: 25 10E3/UL (ref 150–450)
POTASSIUM SERPL-SCNC: 4.1 MMOL/L (ref 3.4–5.3)
RBC # BLD AUTO: 2.23 10E6/UL (ref 3.8–5.2)
SODIUM SERPL-SCNC: 130 MMOL/L (ref 135–145)
WBC # BLD AUTO: 1.8 10E3/UL (ref 4–11)

## 2025-03-05 PROCEDURE — 250N000011 HC RX IP 250 OP 636: Performed by: INTERNAL MEDICINE

## 2025-03-05 PROCEDURE — 250N000013 HC RX MED GY IP 250 OP 250 PS 637: Performed by: STUDENT IN AN ORGANIZED HEALTH CARE EDUCATION/TRAINING PROGRAM

## 2025-03-05 PROCEDURE — 80048 BASIC METABOLIC PNL TOTAL CA: CPT | Performed by: INTERNAL MEDICINE

## 2025-03-05 PROCEDURE — 120N000001 HC R&B MED SURG/OB

## 2025-03-05 PROCEDURE — 99232 SBSQ HOSP IP/OBS MODERATE 35: CPT | Performed by: INTERNAL MEDICINE

## 2025-03-05 PROCEDURE — 250N000012 HC RX MED GY IP 250 OP 636 PS 637: Performed by: INTERNAL MEDICINE

## 2025-03-05 PROCEDURE — 85027 COMPLETE CBC AUTOMATED: CPT | Performed by: INTERNAL MEDICINE

## 2025-03-05 RX ADMIN — METOPROLOL TARTRATE 6.25 MG: 25 TABLET, FILM COATED ORAL at 20:17

## 2025-03-05 RX ADMIN — PREDNISONE 40 MG: 20 TABLET ORAL at 09:30

## 2025-03-05 RX ADMIN — Medication 5 ML: at 06:25

## 2025-03-05 RX ADMIN — METOPROLOL TARTRATE 6.25 MG: 25 TABLET, FILM COATED ORAL at 09:30

## 2025-03-05 ASSESSMENT — ACTIVITIES OF DAILY LIVING (ADL)
ADLS_ACUITY_SCORE: 52
ADLS_ACUITY_SCORE: 53
ADLS_ACUITY_SCORE: 52
ADLS_ACUITY_SCORE: 53
ADLS_ACUITY_SCORE: 52
ADLS_ACUITY_SCORE: 53
ADLS_ACUITY_SCORE: 52
ADLS_ACUITY_SCORE: 52
ADLS_ACUITY_SCORE: 53
ADLS_ACUITY_SCORE: 52
ADLS_ACUITY_SCORE: 53
ADLS_ACUITY_SCORE: 52

## 2025-03-05 NOTE — PROGRESS NOTES
[facility]    PROGRESS NOTE - Hospitalist Service    ASSESSMENT AND PLAN     Active Problems:    Thrombocytopenia    Lower GI bleed    Myelodysplastic syndrome (H)    Anemia due to blood loss, acute    Kayley Putnam is a 89 year old female with a past medical history significant for MDS complicated by pancytopenia, breast cancer, severe mitral regurgitation, atrial fibrillation, not on anticoagulation, CKD, chronic hyponatremia who presented to the ER with complaint of bright red blood per rectum and was found to have acute on chronic anemia.      Recurrent rectal bleeding  Acute on chronic anemia/ Acute on Chronic Blood Loss Anemia   -Received 1 unit of platelet and 1 unit of PRBCs in the ER.     -Gastroenterology was consulted and it was felt that she is not a good candidate for endoscopies  -GI recommended supportive care  -Received 1 unit PRBC ordered for 3/3/25. Plan to administer Lasix following transfusion  -Hemoglobin 7.8 today  -Anticipate discharge to TCU tomorrow    Neutropenia without fever  -Antibiotics discontinued as her clinical picture appears more consistent with fluid overload  -Monitor and add antibiotics if needed.     Pancytopenia  Myelodysplastic syndrome  Breast cancer  Follows [facility] outpatient (Dr. Norton)  Patient is receiving Luspatercept every 3 weeks.  -Chronic pancytopenia in the setting of progressing MDS.    -Received 2 unit PRBC, 1 unit platelets during this admission  -Hgb  7.8, Platelets 25 today  -Will recheck cbc in am     Acute hypoxic respiratory failure  Acute on chronic heart failure exacerbation  Complicated by severe mitral regurgitation  CXR shows bilateral bronchial wall thickening with mid and lower lung predominant reticulonodular opacities.    Azithromycin and ceftriaxone were initiated-discontinued today. Does not appear consistent with pneumonia.    -Received 10 mg IV Lasix 3/1  -Plan for Lasix 40 mg IV push following transfusion  today  -Wean oxygen as able  -Procalcitonin 0.40.  BNP 3539.  -ECHO reviewed from 1/14/2025 with EF of 60 to 65%. Possible torn chordae. Severe 4+ mitral regurgitation.  Moderate to severe 2-3+ tricuspid regurgitation.  -Repeat dose of Lasix as needed.    Possible acute gouty flare?  -Pain with palpation to anterior left ankle.  -Prednisone for 5 days. Will complete course of steroids.     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  Given her advanced age and pancytopenia she was not a surgical candidate and mitral valve was not favorable for MitraClip given significant mitral calcification and mild stenosis.    -Cardiology recommended medical therapy with rate control of her A-fib and diuretics.   -continue home metoprolol 6.25 mg twice daily with holding parameters     Atrial fibrillation, chronic  -Not on anticoagulation in the setting of pancytopenia  -tolerating PTA coreg 6.25mg po bid at this time     Chronic hyponatremia  Sodium 129     CKD stage IIIb  Awaiting am creat     Barriers to discharge: Hypoxia, IV diuresis, hemoglobin stabilization    Anticipated length of stay: 1-2 days     Medically Ready for Discharge: Anticipated Tomorrow to TCU when bed available. Updated patient's daughter today       # Hyponatremia: Lowest Na = 130 mmol/L in last 2 days, will monitor as appropriate         # Thrombocytopenia: Lowest platelets = 23 in last 2 days, will monitor for bleeding   # Hypertension: Noted on problem list                     Subjective:  Patient seen and examined today. Chart reviewed.She stated that she is still having left foot pain. She denies nausea or vomiting. Has no fever. Denies shortness of breath.     PHYSICAL EXAM  Temp:  [98  F (36.7  C)-98.3  F (36.8  C)] 98  F (36.7  C)  Pulse:  [62-69] 69  Resp:  [14-20] 14  BP: (117-132)/(50-58) 128/53  SpO2:  [93 %-95 %] 93  "%  Wt Readings from Last 1 Encounters:   02/03/25 61.2 kg (135 lb)       Intake/Output Summary (Last 24 hours) at 3/3/2025 1212  Last data filed at 3/3/2025 1145  Gross per 24 hour   Intake 360 ml   Output 450 ml   Net -90 ml      There is no height or weight on file to calculate BMI.    GENRL: Alert and answering questions appropriately. Not in acute distress. Lying in bed   CHEST: Clear to auscultation bilaterally. Breathing easily   HEART: Regular rate. +murmur   EXTRM: No pedal edema  NEURO: No focal neurological deficit. No involuntary movements. Normal mentation  PSYCH: Normal affect and mood.   INTGM: No skin rash    Medical Decision Making       55 MINUTES SPENT BY ME on the date of service doing chart review, history, exam, documentation & further activities per the note.      PERTINENT LABS/IMAGING:  Results for orders placed or performed during the hospital encounter of 02/28/25   XR Chest 2 Views    Impression    IMPRESSION: Left IJ Port-A-Cath tip in low SVC. Stable cardiomegaly. Bilateral bronchial wall thickening with mid and lower lung predominant reticulonodular opacities and bibasilar airspace opacities suggesting bronchopneumonia. New small left pleural   effusion. Left breast calcifications redemonstrated.     Most Recent 3 CBC's:  Recent Labs   Lab Test 03/05/25  0620 03/04/25  0700 03/03/25  1237 03/03/25  0646   WBC 1.8* 1.5*  --  1.3*  1.4*   HGB 7.8* 7.4* 8.7* 6.5*   * 102*  --  106*   PLT 25* 23*  --  21*     Most Recent 3 BMP's:  Recent Labs   Lab Test 03/05/25  0620 03/04/25  0700 03/03/25  0646   * 132* 129*   POTASSIUM 4.1 3.8 4.2   CHLORIDE 99 100 100   CO2 22 21* 21*   BUN 50.2* 49.6* 40.3*   CR 1.07* 1.22* 1.25*   ANIONGAP 9 11 8   NATALIA 9.1 8.7* 8.7*   GLC 93 102* 105*     Most Recent 2 LFT's:  Recent Labs   Lab Test 01/15/25  0800   AST 10   ALT 9   ALKPHOS 86   BILITOTAL 1.2       No results for input(s): \"CHOL\", \"HDL\", \"LDL\", \"TRIG\", \"CHOLHDLRATIO\" in the last 72000 " "hours.  No results for input(s): \"LDL\" in the last 35408 hours.  Recent Labs   Lab Test 03/03/25  0646   *   POTASSIUM 4.2   CHLORIDE 100   CO2 21*   *   BUN 40.3*   CR 1.25*   GFRESTIMATED 40*   NATALIA 8.7*     No results for input(s): \"A1C\" in the last 39117 hours.  Recent Labs   Lab Test 03/03/25  0646 03/02/25  0605 03/01/25  0621   HGB 6.5* 8.0* 8.5*     No results for input(s): \"TROPONINI\" in the last 58251 hours.  Recent Labs   Lab Test 03/03/25  0646   NTBNPI 3,539*     No results for input(s): \"TSH\" in the last 67952 hours.  Recent Labs   Lab Test 02/28/25  0143 01/14/25  1555 11/22/24  1136   INR 1.16* 1.23* 1.14       Fritz Allen MD  Hospitalist Service  [facility]      "

## 2025-03-05 NOTE — PLAN OF CARE
"Pt On RA. Alert and orientedx4. Denied pain. On Low fiber diet. Assist of x1 with GB and walker. pposible TCU today.  Problem: Adult Inpatient Plan of Care  Goal: Plan of Care Review  Description: The Plan of Care Review/Shift note should be completed every shift.  The Outcome Evaluation is a brief statement about your assessment that the patient is improving, declining, or no change.  This information will be displayed automatically on your shift  note.  Outcome: Not Progressing  Flowsheets (Taken 3/5/2025 0509)  Plan of Care Reviewed With: patient  Overall Patient Progress: improving  Goal: Patient-Specific Goal (Individualized)  Description: You can add care plan individualizations to a care plan. Examples of Individualization might be:  \"Parent requests to be called daily at 9am for status\", \"I have a hard time hearing out of my right ear\", or \"Do not touch me to wake me up as it startles  me\".  Outcome: Not Progressing  Goal: Absence of Hospital-Acquired Illness or Injury  Outcome: Not Progressing  Intervention: Identify and Manage Fall Risk  Recent Flowsheet Documentation  Taken 3/4/2025 2050 by Jennifer Sanz RN  Safety Promotion/Fall Prevention: activity supervised  Intervention: Prevent Infection  Recent Flowsheet Documentation  Taken 3/4/2025 2050 by Jennifer Sanz RN  Infection Prevention: rest/sleep promoted  Goal: Optimal Comfort and Wellbeing  Outcome: Not Progressing  Goal: Readiness for Transition of Care  Outcome: Not Progressing     Problem: Skin Injury Risk Increased  Goal: Skin Health and Integrity  Outcome: Not Progressing  Intervention: Plan: Nurse Driven Intervention: Moisture Management  Recent Flowsheet Documentation  Taken 3/4/2025 2050 by Jennifer Sanz RN  Moisture Interventions: Encourage regular toileting  Intervention: Plan: Nurse Driven Intervention: Friction and Shear  Recent Flowsheet Documentation  Taken 3/4/2025 2050 by Jennifer Sanz RN  Friction/Shear Interventions: HOB 30 degrees " or less  Intervention: Optimize Skin Protection  Recent Flowsheet Documentation  Taken 3/4/2025 2050 by Jennifer Sanz RN  Activity Management: activity adjusted per tolerance     Problem: Infection  Goal: Absence of Infection Signs and Symptoms  Outcome: Not Progressing     Problem: Fall Injury Risk  Goal: Absence of Fall and Fall-Related Injury  Outcome: Not Progressing  Intervention: Identify and Manage Contributors  Recent Flowsheet Documentation  Taken 3/4/2025 2050 by Jennifer Sanz RN  Medication Review/Management: medications reviewed  Intervention: Promote Injury-Free Environment  Recent Flowsheet Documentation  Taken 3/4/2025 2050 by Jennifer Sanz RN  Safety Promotion/Fall Prevention: activity supervised   Goal Outcome Evaluation:      Plan of Care Reviewed With: patient    Overall Patient Progress: improvingOverall Patient Progress: improving

## 2025-03-05 NOTE — PROGRESS NOTES
Care Management Follow Up    Length of Stay (days): 5    Expected Discharge Date: 03/02/2025     Concerns to be Addressed:       Patient plan of care discussed at interdisciplinary rounds: Yes    Anticipated Discharge Disposition: TCU      Referrals Placed by CM/DOMINIQUE:    Private pay costs discussed: transportation costs    Discussed  Partnership in Safe Discharge Planning  document with patient/family: No     Handoff Completed: No, handoff not indicated or clinically appropriate    Additional Information:  [facility] updated they are able to accept patient on either 3/6 or 3/7 but her chemo medications would need to be held while she is in TCU. SW met with patient who is in agreement with this. SW messaged the physician to update him with potential placement dates & need for chemo meds to be held. DOMINIQUE spoke with patient's daughter, Winston via telephone to also update with the discharge plan & medications need to be held. [facility] admissions stated they will update SW once they know which date the bed will be available & patient & family are aware of this. Patient will need wheelchair transportation arranged once discharge date is known. [id number]    Next Steps: DOMINIQUE will continue to follow to assist with discharge planning.     LUIGI Delcid

## 2025-03-06 ENCOUNTER — APPOINTMENT (OUTPATIENT)
Dept: PHYSICAL THERAPY | Facility: CLINIC | Age: OVER 89
DRG: 377 | End: 2025-03-06
Payer: MEDICARE

## 2025-03-06 VITALS
DIASTOLIC BLOOD PRESSURE: 50 MMHG | RESPIRATION RATE: 20 BRPM | TEMPERATURE: 98.5 F | HEART RATE: 63 BPM | SYSTOLIC BLOOD PRESSURE: 134 MMHG | OXYGEN SATURATION: 92 %

## 2025-03-06 LAB
ANION GAP SERPL CALCULATED.3IONS-SCNC: 7 MMOL/L (ref 7–15)
BUN SERPL-MCNC: 51.2 MG/DL (ref 8–23)
CALCIUM SERPL-MCNC: 9.1 MG/DL (ref 8.8–10.4)
CHLORIDE SERPL-SCNC: 103 MMOL/L (ref 98–107)
CREAT SERPL-MCNC: 1.02 MG/DL (ref 0.51–0.95)
EGFRCR SERPLBLD CKD-EPI 2021: 52 ML/MIN/1.73M2
ERYTHROCYTE [DISTWIDTH] IN BLOOD BY AUTOMATED COUNT: 25 % (ref 10–15)
GLUCOSE SERPL-MCNC: 93 MG/DL (ref 70–99)
HCO3 SERPL-SCNC: 22 MMOL/L (ref 22–29)
HCT VFR BLD AUTO: 22.9 % (ref 35–47)
HGB BLD-MCNC: 7.6 G/DL (ref 11.7–15.7)
MCH RBC QN AUTO: 34.5 PG (ref 26.5–33)
MCHC RBC AUTO-ENTMCNC: 33.2 G/DL (ref 31.5–36.5)
MCV RBC AUTO: 104 FL (ref 78–100)
PLATELET # BLD AUTO: 28 10E3/UL (ref 150–450)
POTASSIUM SERPL-SCNC: 4.3 MMOL/L (ref 3.4–5.3)
RBC # BLD AUTO: 2.2 10E6/UL (ref 3.8–5.2)
SODIUM SERPL-SCNC: 132 MMOL/L (ref 135–145)
WBC # BLD AUTO: 2 10E3/UL (ref 4–11)

## 2025-03-06 PROCEDURE — 250N000013 HC RX MED GY IP 250 OP 250 PS 637: Performed by: STUDENT IN AN ORGANIZED HEALTH CARE EDUCATION/TRAINING PROGRAM

## 2025-03-06 PROCEDURE — 99233 SBSQ HOSP IP/OBS HIGH 50: CPT | Performed by: INTERNAL MEDICINE

## 2025-03-06 PROCEDURE — 80048 BASIC METABOLIC PNL TOTAL CA: CPT | Performed by: INTERNAL MEDICINE

## 2025-03-06 PROCEDURE — 120N000001 HC R&B MED SURG/OB

## 2025-03-06 PROCEDURE — 97110 THERAPEUTIC EXERCISES: CPT | Mod: GP | Performed by: PHYSICAL THERAPIST

## 2025-03-06 PROCEDURE — 85027 COMPLETE CBC AUTOMATED: CPT | Performed by: INTERNAL MEDICINE

## 2025-03-06 PROCEDURE — 250N000012 HC RX MED GY IP 250 OP 636 PS 637: Performed by: INTERNAL MEDICINE

## 2025-03-06 PROCEDURE — 250N000011 HC RX IP 250 OP 636: Performed by: INTERNAL MEDICINE

## 2025-03-06 RX ORDER — METOPROLOL TARTRATE 25 MG/1
6.25 TABLET, FILM COATED ORAL 2 TIMES DAILY
DISCHARGE
Start: 2025-03-06

## 2025-03-06 RX ADMIN — SENNOSIDES AND DOCUSATE SODIUM 1 TABLET: 50; 8.6 TABLET ORAL at 12:18

## 2025-03-06 RX ADMIN — Medication 5 ML: at 05:46

## 2025-03-06 RX ADMIN — METOPROLOL TARTRATE 6.25 MG: 25 TABLET, FILM COATED ORAL at 08:28

## 2025-03-06 RX ADMIN — HEPARIN 5 ML: 100 SYRINGE at 12:18

## 2025-03-06 RX ADMIN — METOPROLOL TARTRATE 6.25 MG: 25 TABLET, FILM COATED ORAL at 19:54

## 2025-03-06 RX ADMIN — PREDNISONE 40 MG: 20 TABLET ORAL at 08:28

## 2025-03-06 ASSESSMENT — ACTIVITIES OF DAILY LIVING (ADL)
ADLS_ACUITY_SCORE: 52
ADLS_ACUITY_SCORE: 56
ADLS_ACUITY_SCORE: 52
ADLS_ACUITY_SCORE: 56
ADLS_ACUITY_SCORE: 52
ADLS_ACUITY_SCORE: 56
ADLS_ACUITY_SCORE: 52
ADLS_ACUITY_SCORE: 56
ADLS_ACUITY_SCORE: 52
ADLS_ACUITY_SCORE: 52
ADLS_ACUITY_SCORE: 56

## 2025-03-06 NOTE — PROGRESS NOTES
Care Management Follow Up    Length of Stay (days): 6    Expected Discharge Date: 03/07/2025     Concerns to be Addressed:       Patient plan of care discussed at interdisciplinary rounds: Yes    Anticipated Discharge Disposition: TCU     Referrals Placed by CM/DOMINIQUE:    Private pay costs discussed: private room/amenity fees and transportation costs    Discussed  Partnership in Safe Discharge Planning  document with patient/family: No     Handoff Completed: No, handoff not indicated or clinically appropriate    Additional Information:  SW met with patient to update her that [facility] can accept her tomorrow to a private room & she is agreeable to the $46 a day private room fee. Patient confirmed she needs wheelchair transportation arranged at discharge. [facility] transport will pick patient up between 1308 and 1353 on 3/7. SW updated patient of transport time and reviewed the estimated out of pocket expense of $90.70/base rate & $6.08/mile. Patient voiced understanding & agreement. SW updated [facility] with transport time.     SW spoke with patient's daughter, Winston to update with discharge date, transportation time, & private room fee. She voiced appreciation for the update & denied any additional questions related to discharge planning.     Next Steps: DOMINIQUE will continue to follow to assist prn with discharge planning.     ULIGI Delcid

## 2025-03-06 NOTE — PROGRESS NOTES
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 (1% blast present and stable)  -Receiving Luspatercept every 3 weeks  -Most recently possible increase in blasts, peripheral smear performed as outpatient, shows minimal increase in blasts but lower counts.     PLAN:  -Continue transfusion support  -Luspatercept  -No plans for chemotherapy or aggressive cares  -Kayley has previously indicated that she would like to stop all cares.  However her daughter was not comfortable with this.  Her daughter is currently out of state and only back in May.  She is not certain she will do anything till her daughter returns in person     2. GI bleed  -Dark stool was reported in the ER, stool occult positive.  Currently no active bleeding  -GI has been consulted in the past also and no scope recommended     PLAN:  - GI keeping age and goals in mind would not recommend endoscopy at this point  -Continue Protonix     3.Pancytopenia related to MDS      PLAN:  -ANC is low, no evidence of neutropenic symptoms, antibiotics if neutropenic fever  -PRBC if hemoglobin less than 7  -PLT transfusion if less than 10 or actively bleeding     4. DNR/ DNI     Discharge home or back to living facility tomorrow if counts stable.  See above for discussions of goals of care, patient feels she can only do this discussion when her daughter returns.  She also feels she is not certain she will continue any kind of treatments      Reviewed chart notes, plan for TCU.  Okay to hold Luspatercept injection from our standpoint while in TCU.    Subjective:    Feeling about the same no further dark stool      Labs:  All labs reviewed    CBC  Recent Labs   Lab 03/05/25  0620 03/04/25  0700 03/03/25  1237 03/03/25  0646   WBC 1.8* 1.5*  --  1.3*  1.4*   HGB 7.8* 7.4* 8.7* 6.5*   * 102*  --  106*   PLT 25* 23*  --  " 21*       CMP  Recent Labs   Lab 03/05/25  0620 03/04/25  0700 03/03/25  0646 03/02/25  0605 03/01/25  1255 02/28/25  0143   * 132* 129* 135   < > 132*   POTASSIUM 4.1 3.8 4.2  --   --  4.7   CHLORIDE 99 100 100  --   --  99   CO2 22 21* 21*  --   --  21*   ANIONGAP 9 11 8  --   --  12   GLC 93 102* 105*  --   --  112*   BUN 50.2* 49.6* 40.3*  --   --  56.8*   CR 1.07* 1.22* 1.25* 1.06*   < > 1.29*   GFRESTIMATED 49* 42* 40* 49*   < > 39*   NATALIA 9.1 8.7* 8.7*  --   --  9.3    < > = values in this interval not displayed.       INR  Recent Labs   Lab 02/28/25  0143   INR 1.16*       Blood CultureNo results for input(s): \"CULT\" in the last 168 hours.      Vince Norton MD  [facility]  3/5/2025 6:29 PM       "

## 2025-03-06 NOTE — PLAN OF CARE
Goal Outcome Evaluation:      Plan of Care Reviewed With: patient    Overall Patient Progress: improvingOverall Patient Progress: improving    Outcome Evaluation: Patient will discharge to [facility] TCU with [facility] transport.

## 2025-03-06 NOTE — PROVIDER NOTIFICATION
Discussed with provider via [facility] if port should be de-accessed rather than needle change due to anticipated discharge today or tomorrow.    Per provider, ok to de-access port and be without IV access.

## 2025-03-06 NOTE — PLAN OF CARE
"Summary: Thrombocytopenia; Lower GI bleed; Myelodysplastic syndrome (H); Anemia due to blood loss, acute  Orientation: A&O x 4; [facility]  Vitals/Tele: VSS on RA  IV Access/drains: Port - heparin locked  Diet: Low fiber  Mobility: A1GBW  GI/: Intermittent incontinence - purewick in place (no redness or skin breakdown)  Wound/Skin: Blanchable redness in areas of bony prominences, see chart for interventions  Discharge Plan: Tomorrow or Friday to [facility]    See Flow sheets for assessment      Goal Outcome Evaluation:      Plan of Care Reviewed With: patient    Overall Patient Progress: improving    Outcome Evaluation: A&O x 4; Good appetite      Problem: Adult Inpatient Plan of Care  Goal: Plan of Care Review  Description: The Plan of Care Review/Shift note should be completed every shift.  The Outcome Evaluation is a brief statement about your assessment that the patient is improving, declining, or no change.  This information will be displayed automatically on your shift  note.  Outcome: Progressing  Flowsheets (Taken 3/5/2025 1906)  Outcome Evaluation:   A&O x 4   Good appetite  Plan of Care Reviewed With: patient  Overall Patient Progress: improving  Goal: Patient-Specific Goal (Individualized)  Description: You can add care plan individualizations to a care plan. Examples of Individualization might be:  \"Parent requests to be called daily at 9am for status\", \"I have a hard time hearing out of my right ear\", or \"Do not touch me to wake me up as it startles  me\".  Outcome: Progressing  Goal: Absence of Hospital-Acquired Illness or Injury  Outcome: Progressing  Intervention: Identify and Manage Fall Risk  Recent Flowsheet Documentation  Taken 3/5/2025 1500 by Leslee Linder, RN  Safety Promotion/Fall Prevention: safety round/check completed  Taken 3/5/2025 1200 by Leslee Linder, RN  Safety Promotion/Fall Prevention: safety round/check completed  Taken 3/5/2025 0830 by Leslee Linder, RN  Safety " Promotion/Fall Prevention: safety round/check completed  Taken 3/5/2025 0806 by Leslee Linder RN  Safety Promotion/Fall Prevention: safety round/check completed  Intervention: Prevent Skin Injury  Recent Flowsheet Documentation  Taken 3/5/2025 1700 by Leslee Linder RN  Body Position: heels elevated  Taken 3/5/2025 1500 by Leslee Linder RN  Body Position: position changed independently  Taken 3/5/2025 0830 by Leslee Linder RN  Body Position: position changed independently  Intervention: Prevent Infection  Recent Flowsheet Documentation  Taken 3/5/2025 0830 by Leslee Linder RN  Infection Prevention:   single patient room provided   rest/sleep promoted  Goal: Optimal Comfort and Wellbeing  Outcome: Progressing  Intervention: Monitor Pain and Promote Comfort  Recent Flowsheet Documentation  Taken 3/5/2025 1500 by Leslee Linder RN  Pain Management Interventions: declines  Goal: Readiness for Transition of Care  Outcome: Progressing     Problem: Skin Injury Risk Increased  Goal: Skin Health and Integrity  Outcome: Progressing  Intervention: Plan: Nurse Driven Intervention: Moisture Management  Recent Flowsheet Documentation  Taken 3/5/2025 0830 by Leslee Linder RN  Moisture Interventions:   Encourage regular toileting   No brief in bed   Incontinence pad  Intervention: Plan: Nurse Driven Intervention: Friction and Shear  Recent Flowsheet Documentation  Taken 3/5/2025 0830 by Leslee Linder RN  Friction/Shear Interventions: HOB 30 degrees or less  Intervention: Optimize Skin Protection  Recent Flowsheet Documentation  Taken 3/5/2025 1500 by Leslee Linder RN  Activity Management: activity adjusted per tolerance  Taken 3/5/2025 0830 by Leslee Linder RN  Activity Management: activity adjusted per tolerance     Problem: Infection  Goal: Absence of Infection Signs and Symptoms  Outcome: Progressing     Problem: Fall Injury Risk  Goal: Absence of Fall and Fall-Related Injury  Outcome:  Progressing  Intervention: Identify and Manage Contributors  Recent Flowsheet Documentation  Taken 3/5/2025 0830 by Leslee Linder RN  Medication Review/Management: medications reviewed  Intervention: Promote Injury-Free Environment  Recent Flowsheet Documentation  Taken 3/5/2025 1500 by Leslee Linder RN  Safety Promotion/Fall Prevention: safety round/check completed  Taken 3/5/2025 1200 by Leslee Linder RN  Safety Promotion/Fall Prevention: safety round/check completed  Taken 3/5/2025 0830 by Leslee Linder RN  Safety Promotion/Fall Prevention: safety round/check completed  Taken 3/5/2025 0806 by Leslee Linder RN  Safety Promotion/Fall Prevention: safety round/check completed

## 2025-03-06 NOTE — PROGRESS NOTES
[facility]    PROGRESS NOTE - Hospitalist Service    ASSESSMENT AND PLAN     Active Problems:    Thrombocytopenia    Lower GI bleed    Myelodysplastic syndrome (H)    Anemia due to blood loss, acute    Kayley Putnam is a 89 year old female with a past medical history significant for MDS complicated by pancytopenia, breast cancer, severe mitral regurgitation, atrial fibrillation, not on anticoagulation, CKD, chronic hyponatremia who presented to the ER with complaint of bright red blood per rectum and was found to have acute on chronic anemia.      Recurrent rectal bleeding  Acute on chronic anemia/ Acute on Chronic Blood Loss Anemia   -Received 1 unit of platelet and 1 unit of PRBCs in the ER.     -Gastroenterology was consulted and it was felt that she is not a good candidate for endoscopies  -GI recommended supportive care  -Received 1 unit PRBC ordered for 3/3/25. Plan to give Lasix with transfusion.   -Hemoglobin 7.6 today  -Anticipate discharge to TCU tomorrow    Neutropenia without fever  -Antibiotics discontinued as her clinical picture appears more consistent with fluid overload  -Monitor and add antibiotics if needed.     Pancytopenia  Myelodysplastic syndrome  Breast cancer  Follows [place] [facility] outpatient (Dr. Norton)  Patient is receiving Luspatercept every 3 weeks.  -Chronic pancytopenia in the setting of progressing MDS.    -Received 2 unit PRBC, 1 unit platelets during this admission  -Hgb 7.6, platelets 28.   -Will monitor cbc.     Acute hypoxic respiratory failure  Acute on chronic heart failure exacerbation  Complicated by severe mitral regurgitation  CXR shows bilateral bronchial wall thickening with mid and lower lung predominant reticulonodular opacities.    Azithromycin and ceftriaxone were initiated-discontinued today. Does not appear consistent with pneumonia.    -Received Lasix with transfusion  -Procalcitonin 0.40.  BNP 3539.  -ECHO reviewed from 1/14/2025 with  EF of 60 to 65%. Possible torn chordae. Severe 4+ mitral regurgitation.  Moderate to severe 2-3+ tricuspid regurgitation.  -Repeat dose of Lasix as needed.    Possible acute gouty flare?  -Pain with palpation to anterior left ankle.  -Prednisone for 5 days. Will complete course of steroids.     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  Given her advanced age and pancytopenia she was not a surgical candidate and mitral valve was not favorable for MitraClip given significant mitral calcification and mild stenosis.    -Cardiology recommended medical therapy with rate control of her A-fib and diuretics.   -continue home metoprolol 6.25 mg twice daily with holding parameters     Atrial fibrillation, chronic  -Not on anticoagulation in the setting of pancytopenia  -tolerating PTA coreg 6.25mg po bid at this time     Chronic hyponatremia  Sodium 132 today     CKD stage IIIb  Creatinine stable at 1.02.      Barriers to discharge: Hypoxia, IV diuresis, hemoglobin stabilization    Anticipated length of stay: 1-2 days     Medically Ready for Discharge: Anticipated Tomorrow to TCU when bed available. Updated patient's daughter today       # Hyponatremia: Lowest Na = 130 mmol/L in last 2 days, will monitor as appropriate         # Thrombocytopenia: Lowest platelets = 25 in last 2 days, will monitor for bleeding   # Hypertension: Noted on problem list                     Subjective:  Patient seen and examined today. She stated that she is feeling okay. She denies nausea or vomiting. She has no fever. Has no black stools. No cough or shortness of breath.     PHYSICAL EXAM  Temp:  [97.9  F (36.6  C)-98.6  F (37  C)] 97.9  F (36.6  C)  Pulse:  [55-74] 61  Resp:  [14-24] 18  BP: (122-138)/(45-57) 122/50  SpO2:  [94 %-96 %] 95 %  Wt Readings from Last 1 Encounters:   02/03/25 61.2 kg (135 lb)  "      Intake/Output Summary (Last 24 hours) at 3/3/2025 1212  Last data filed at 3/3/2025 1145  Gross per 24 hour   Intake 360 ml   Output 450 ml   Net -90 ml      There is no height or weight on file to calculate BMI.    GENRL: Alert and answering questions appropriately. Not in acute distress. Lying in bed   CHEST: Clear to auscultation bilaterally. Breathing easily   HEART: Regular rate. +murmur   EXTRM: No pedal edema  NEURO: No focal neurological deficit. No involuntary movements. Normal mentation  PSYCH: Normal affect and mood.   INTGM: No skin rash    Medical Decision Making       55 MINUTES SPENT BY ME on the date of service doing chart review, history, exam, documentation & further activities per the note.      PERTINENT LABS/IMAGING:  Results for orders placed or performed during the hospital encounter of 02/28/25   XR Chest 2 Views    Impression    IMPRESSION: Left IJ Port-A-Cath tip in low SVC. Stable cardiomegaly. Bilateral bronchial wall thickening with mid and lower lung predominant reticulonodular opacities and bibasilar airspace opacities suggesting bronchopneumonia. New small left pleural   effusion. Left breast calcifications redemonstrated.     Most Recent 3 CBC's:  Recent Labs   Lab Test 03/06/25  0545 03/05/25  0620 03/04/25  0700   WBC 2.0* 1.8* 1.5*   HGB 7.6* 7.8* 7.4*   * 103* 102*   PLT 28* 25* 23*     Most Recent 3 BMP's:  Recent Labs   Lab Test 03/06/25  0545 03/05/25  0620 03/04/25  0700   * 130* 132*   POTASSIUM 4.3 4.1 3.8   CHLORIDE 103 99 100   CO2 22 22 21*   BUN 51.2* 50.2* 49.6*   CR 1.02* 1.07* 1.22*   ANIONGAP 7 9 11   NATALIA 9.1 9.1 8.7*   GLC 93 93 102*     Most Recent 2 LFT's:  Recent Labs   Lab Test 01/15/25  0800   AST 10   ALT 9   ALKPHOS 86   BILITOTAL 1.2       No results for input(s): \"CHOL\", \"HDL\", \"LDL\", \"TRIG\", \"CHOLHDLRATIO\" in the last 38674 hours.  No results for input(s): \"LDL\" in the last 24847 hours.  Recent Labs   Lab Test 03/03/25  0646   * " "  POTASSIUM 4.2   CHLORIDE 100   CO2 21*   *   BUN 40.3*   CR 1.25*   GFRESTIMATED 40*   NATALIA 8.7*     No results for input(s): \"A1C\" in the last 98831 hours.  Recent Labs   Lab Test 03/03/25  0646 03/02/25  0605 03/01/25  0621   HGB 6.5* 8.0* 8.5*     No results for input(s): \"TROPONINI\" in the last 44093 hours.  Recent Labs   Lab Test 03/03/25  0646   NTBNPI 3,539*     No results for input(s): \"TSH\" in the last 02586 hours.  Recent Labs   Lab Test 02/28/25  0143 01/14/25  1555 11/22/24  1136   INR 1.16* 1.23* 1.14       Fritz Allen MD  Hospitalist Service  [facility]      "

## 2025-03-06 NOTE — PLAN OF CARE
"Pt alert and orientedx4. RA. Low fiber diet. Assist ofx1 with GB and walker. [place] on. No pain. Possible discharge either today or tomorrow.   Problem: Adult Inpatient Plan of Care  Goal: Plan of Care Review  Description: The Plan of Care Review/Shift note should be completed every shift.  The Outcome Evaluation is a brief statement about your assessment that the patient is improving, declining, or no change.  This information will be displayed automatically on your shift  note.  Outcome: Progressing  Flowsheets (Taken 3/6/2025 0524)  Outcome Evaluation: pt alert and orientedx4, no BM during the shift.  Plan of Care Reviewed With: patient  Overall Patient Progress: improving  Goal: Patient-Specific Goal (Individualized)  Description: You can add care plan individualizations to a care plan. Examples of Individualization might be:  \"Parent requests to be called daily at 9am for status\", \"I have a hard time hearing out of my right ear\", or \"Do not touch me to wake me up as it startles  me\".  Outcome: Progressing  Goal: Absence of Hospital-Acquired Illness or Injury  Outcome: Progressing  Intervention: Identify and Manage Fall Risk  Recent Flowsheet Documentation  Taken 3/5/2025 2011 by Jennifer Sanz, RN  Safety Promotion/Fall Prevention:   activity supervised   clutter free environment maintained  Intervention: Prevent Skin Injury  Recent Flowsheet Documentation  Taken 3/5/2025 2011 by Jennifer Sanz, RN  Body Position: foot of bed elevated  Intervention: Prevent Infection  Recent Flowsheet Documentation  Taken 3/5/2025 2011 by Jennifer Sanz, RN  Infection Prevention: rest/sleep promoted  Goal: Optimal Comfort and Wellbeing  Outcome: Progressing  Goal: Readiness for Transition of Care  Outcome: Progressing     Problem: Skin Injury Risk Increased  Goal: Skin Health and Integrity  Outcome: Progressing  Intervention: Plan: Nurse Driven Intervention: Moisture Management  Recent Flowsheet Documentation  Taken 3/5/2025 2011 by " Mbaka, Jennifer, RN  Moisture Interventions:   Incontinence pad   Urinary collection device  Taken 3/5/2025 2000 by Jennifer Sanz RN  Moisture Interventions:   Incontinence pad   Urinary collection device  Bathing/Skin Care: incontinence care  Intervention: Plan: Nurse Driven Intervention: Friction and Shear  Recent Flowsheet Documentation  Taken 3/5/2025 2011 by Jennifer Sanz RN  Friction/Shear Interventions: HOB 30 degrees or less  Intervention: Optimize Skin Protection  Recent Flowsheet Documentation  Taken 3/5/2025 2011 by Jennifer Sanz RN  Activity Management: activity adjusted per tolerance  Head of Bed (HOB) Positioning: HOB at 20-30 degrees     Problem: Infection  Goal: Absence of Infection Signs and Symptoms  Outcome: Progressing     Problem: Fall Injury Risk  Goal: Absence of Fall and Fall-Related Injury  Outcome: Progressing  Intervention: Identify and Manage Contributors  Recent Flowsheet Documentation  Taken 3/5/2025 2011 by Jennifer Sanz RN  Medication Review/Management: medications reviewed  Intervention: Promote Injury-Free Environment  Recent Flowsheet Documentation  Taken 3/5/2025 2011 by Jennifer Sazn RN  Safety Promotion/Fall Prevention:   activity supervised   clutter free environment maintained   Goal Outcome Evaluation:      Plan of Care Reviewed With: patient    Overall Patient Progress: improvingOverall Patient Progress: improving    Outcome Evaluation: pt alert and orientedx4, no BM during the shift.

## 2025-03-06 NOTE — PLAN OF CARE
"Goal Outcome Evaluation:      Plan of Care Reviewed With: patient    Overall Patient Progress: improvingOverall Patient Progress: improving    Outcome Evaluation: Discharge to TCU tomorrow    /57 (BP Location: Left arm)   Pulse 66   Temp 98.6  F (37  C) (Oral)   Resp 18   SpO2 96%   On RA.    Pertinent Assessments: A/Ox4. LS clear. Heart murmur noted. LLE tender at baseline. Voiding via external cath. Up with 1A gb/w. Denies pain.   Major Shift Events: None  Treatment Plan: TCU accepted, discharge tomorrow. Pt, Hem/onc, GI consulted while inpatient.           Problem: Adult Inpatient Plan of Care  Goal: Plan of Care Review  Description: The Plan of Care Review/Shift note should be completed every shift.  The Outcome Evaluation is a brief statement about your assessment that the patient is improving, declining, or no change.  This information will be displayed automatically on your shift  note.  Outcome: Progressing  Flowsheets (Taken 3/6/2025 1053)  Outcome Evaluation: Discharge to TCU tomorrow  Plan of Care Reviewed With: patient  Overall Patient Progress: improving  Goal: Patient-Specific Goal (Individualized)  Description: You can add care plan individualizations to a care plan. Examples of Individualization might be:  \"Parent requests to be called daily at 9am for status\", \"I have a hard time hearing out of my right ear\", or \"Do not touch me to wake me up as it startles  me\".  Outcome: Progressing  Goal: Absence of Hospital-Acquired Illness or Injury  Outcome: Progressing  Intervention: Identify and Manage Fall Risk  Recent Flowsheet Documentation  Taken 3/6/2025 1049 by Denisse Valadez, RN  Safety Promotion/Fall Prevention: safety round/check completed  Taken 3/6/2025 0830 by Denisse Valadez, RN  Safety Promotion/Fall Prevention:   activity supervised   clutter free environment maintained   nonskid shoes/slippers when out of bed   patient and family education   supervised " activity  Intervention: Prevent Skin Injury  Recent Flowsheet Documentation  Taken 3/6/2025 0830 by Denisse Valadez RN  Body Position: position changed independently  Intervention: Prevent and Manage VTE (Venous Thromboembolism) Risk  Recent Flowsheet Documentation  Taken 3/6/2025 0830 by Denisse Valadez RN  VTE Prevention/Management: SCDs off (sequential compression devices)  Intervention: Prevent Infection  Recent Flowsheet Documentation  Taken 3/6/2025 0830 by Denisse Valadez RN  Infection Prevention:   single patient room provided   rest/sleep promoted  Goal: Optimal Comfort and Wellbeing  Outcome: Progressing  Goal: Readiness for Transition of Care  Outcome: Progressing     Problem: Skin Injury Risk Increased  Goal: Skin Health and Integrity  Outcome: Progressing  Intervention: Plan: Nurse Driven Intervention: Moisture Management  Recent Flowsheet Documentation  Taken 3/6/2025 0830 by Denisse Valadez RN  Moisture Interventions:   Incontinence pad   Urinary collection device  Intervention: Plan: Nurse Driven Intervention: Friction and Shear  Recent Flowsheet Documentation  Taken 3/6/2025 0830 by Denisse Valadez RN  Friction/Shear Interventions: HOB 30 degrees or less  Intervention: Optimize Skin Protection  Recent Flowsheet Documentation  Taken 3/6/2025 0830 by Denisse Valadez RN  Activity Management: activity adjusted per tolerance     Problem: Fall Injury Risk  Goal: Absence of Fall and Fall-Related Injury  Outcome: Progressing  Intervention: Identify and Manage Contributors  Recent Flowsheet Documentation  Taken 3/6/2025 0830 by Denisse Valadez RN  Medication Review/Management: medications reviewed  Intervention: Promote Injury-Free Environment  Recent Flowsheet Documentation  Taken 3/6/2025 1049 by Denisse Valadez RN  Safety Promotion/Fall Prevention: safety round/check completed  Taken 3/6/2025 0830 by Denisse Valadez RN  Safety Promotion/Fall  Prevention:   activity supervised   clutter free environment maintained   nonskid shoes/slippers when out of bed   patient and family education   supervised activity     Problem: Infection  Goal: Absence of Infection Signs and Symptoms  Outcome: Progressing

## 2025-03-07 VITALS
RESPIRATION RATE: 20 BRPM | SYSTOLIC BLOOD PRESSURE: 132 MMHG | DIASTOLIC BLOOD PRESSURE: 56 MMHG | OXYGEN SATURATION: 96 % | TEMPERATURE: 97.8 F | HEART RATE: 66 BPM

## 2025-03-07 LAB
ACANTHOCYTES BLD QL SMEAR: SLIGHT
ANION GAP SERPL CALCULATED.3IONS-SCNC: 12 MMOL/L (ref 7–15)
BASOPHILS # BLD MANUAL: 0 10E3/UL (ref 0–0.2)
BASOPHILS NFR BLD MANUAL: 0 %
BUN SERPL-MCNC: 49.4 MG/DL (ref 8–23)
CALCIUM SERPL-MCNC: 9.1 MG/DL (ref 8.8–10.4)
CHLORIDE SERPL-SCNC: 104 MMOL/L (ref 98–107)
CREAT SERPL-MCNC: 1.01 MG/DL (ref 0.51–0.95)
EGFRCR SERPLBLD CKD-EPI 2021: 52 ML/MIN/1.73M2
ELLIPTOCYTES BLD QL SMEAR: SLIGHT
EOSINOPHIL # BLD MANUAL: 0 10E3/UL (ref 0–0.7)
EOSINOPHIL NFR BLD MANUAL: 0 %
ERYTHROCYTE [DISTWIDTH] IN BLOOD BY AUTOMATED COUNT: 24.6 % (ref 10–15)
GLUCOSE SERPL-MCNC: 91 MG/DL (ref 70–99)
HCO3 SERPL-SCNC: 18 MMOL/L (ref 22–29)
HCT VFR BLD AUTO: 24.5 % (ref 35–47)
HGB BLD-MCNC: 8.2 G/DL (ref 11.7–15.7)
LYMPHOCYTES # BLD MANUAL: 1 10E3/UL (ref 0.8–5.3)
LYMPHOCYTES NFR BLD MANUAL: 45 %
MCH RBC QN AUTO: 34.9 PG (ref 26.5–33)
MCHC RBC AUTO-ENTMCNC: 33.5 G/DL (ref 31.5–36.5)
MCV RBC AUTO: 104 FL (ref 78–100)
METAMYELOCYTES # BLD MANUAL: 0 10E3/UL
METAMYELOCYTES NFR BLD MANUAL: 2 %
MONOCYTES # BLD MANUAL: 0.2 10E3/UL (ref 0–1.3)
MONOCYTES NFR BLD MANUAL: 10 %
NEUTROPHILS # BLD MANUAL: 1 10E3/UL (ref 1.6–8.3)
NEUTROPHILS NFR BLD MANUAL: 43 %
PLAT MORPH BLD: ABNORMAL
PLATELET # BLD AUTO: 32 10E3/UL (ref 150–450)
POLYCHROMASIA BLD QL SMEAR: SLIGHT
POTASSIUM SERPL-SCNC: 4.2 MMOL/L (ref 3.4–5.3)
RBC # BLD AUTO: 2.35 10E6/UL (ref 3.8–5.2)
RBC MORPH BLD: ABNORMAL
SODIUM SERPL-SCNC: 134 MMOL/L (ref 135–145)
WBC # BLD AUTO: 2.3 10E3/UL (ref 4–11)

## 2025-03-07 PROCEDURE — 99238 HOSP IP/OBS DSCHRG MGMT 30/<: CPT | Performed by: INTERNAL MEDICINE

## 2025-03-07 PROCEDURE — 36415 COLL VENOUS BLD VENIPUNCTURE: CPT | Performed by: INTERNAL MEDICINE

## 2025-03-07 PROCEDURE — 85018 HEMOGLOBIN: CPT | Performed by: INTERNAL MEDICINE

## 2025-03-07 PROCEDURE — 85007 BL SMEAR W/DIFF WBC COUNT: CPT | Performed by: INTERNAL MEDICINE

## 2025-03-07 PROCEDURE — 250N000013 HC RX MED GY IP 250 OP 250 PS 637: Performed by: STUDENT IN AN ORGANIZED HEALTH CARE EDUCATION/TRAINING PROGRAM

## 2025-03-07 PROCEDURE — 250N000012 HC RX MED GY IP 250 OP 636 PS 637: Performed by: INTERNAL MEDICINE

## 2025-03-07 PROCEDURE — 80048 BASIC METABOLIC PNL TOTAL CA: CPT | Performed by: INTERNAL MEDICINE

## 2025-03-07 RX ADMIN — PREDNISONE 40 MG: 20 TABLET ORAL at 08:44

## 2025-03-07 RX ADMIN — METOPROLOL TARTRATE 6.25 MG: 25 TABLET, FILM COATED ORAL at 08:44

## 2025-03-07 ASSESSMENT — ACTIVITIES OF DAILY LIVING (ADL)
ADLS_ACUITY_SCORE: 52
ADLS_ACUITY_SCORE: 52
ADLS_ACUITY_SCORE: 56
ADLS_ACUITY_SCORE: 53
ADLS_ACUITY_SCORE: 56
ADLS_ACUITY_SCORE: 56
ADLS_ACUITY_SCORE: 53
ADLS_ACUITY_SCORE: 56
ADLS_ACUITY_SCORE: 56
ADLS_ACUITY_SCORE: 52
ADLS_ACUITY_SCORE: 52
ADLS_ACUITY_SCORE: 56
ADLS_ACUITY_SCORE: 56

## 2025-03-07 NOTE — PLAN OF CARE
"  Patient's After Visit Summary was reviewed: NA  patient/family: Not applicable   Discharged with transport tech     Pt discharged to TCU.       Goal Outcome Evaluation:      Plan of Care Reviewed With: patient    Overall Patient Progress: improvingOverall Patient Progress: improving    Outcome Evaluation: Alert and oriented, denies of pain, up with assist of 1, walker and gait belt, incontinent of urine at times. SOB with activities, on RA with saturation >92%, no acute changes noted this shift.       Problem: Adult Inpatient Plan of Care  Goal: Plan of Care Review  Description: The Plan of Care Review/Shift note should be completed every shift.  The Outcome Evaluation is a brief statement about your assessment that the patient is improving, declining, or no change.  This information will be displayed automatically on your shift  note.  Outcome: Adequate for Care Transition  Flowsheets (Taken 3/7/2025 0954)  Outcome Evaluation: Alert and oriented, denies of pain, up with assist of 1, walker and gait belt,  Plan of Care Reviewed With: patient  Overall Patient Progress: improving  Goal: Patient-Specific Goal (Individualized)  Description: You can add care plan individualizations to a care plan. Examples of Individualization might be:  \"Parent requests to be called daily at 9am for status\", \"I have a hard time hearing out of my right ear\", or \"Do not touch me to wake me up as it startles  me\".  Outcome: Adequate for Care Transition  Goal: Absence of Hospital-Acquired Illness or Injury  Outcome: Adequate for Care Transition  Intervention: Identify and Manage Fall Risk  Recent Flowsheet Documentation  Taken 3/7/2025 3489 by Zeina Pickens RN  Safety Promotion/Fall Prevention:   patient and family education   nonskid shoes/slippers when out of bed   lighting adjusted   clutter free environment maintained   safety round/check completed   activity supervised  Intervention: Prevent Skin Injury  Recent Flowsheet " Documentation  Taken 3/7/2025 1049 by Zeina Pickens RN  Body Position: weight shifting  Taken 3/7/2025 0843 by Zeina Pickens RN  Body Position: position changed independently  Skin Protection:   adhesive use limited   incontinence pads utilized  Intervention: Prevent and Manage VTE (Venous Thromboembolism) Risk  Recent Flowsheet Documentation  Taken 3/7/2025 0843 by Zeina Pickens RN  VTE Prevention/Management: SCDs off (sequential compression devices)  Intervention: Prevent Infection  Recent Flowsheet Documentation  Taken 3/7/2025 0843 by Zeina Pickens RN  Infection Prevention: hand hygiene promoted  Goal: Optimal Comfort and Wellbeing  Outcome: Adequate for Care Transition  Goal: Readiness for Transition of Care  Outcome: Adequate for Care Transition     Problem: Skin Injury Risk Increased  Goal: Skin Health and Integrity  Outcome: Adequate for Care Transition  Intervention: Plan: Nurse Driven Intervention: Moisture Management  Recent Flowsheet Documentation  Taken 3/7/2025 0843 by Zeina Pickens RN  Moisture Interventions: Urinary collection device  Intervention: Plan: Nurse Driven Intervention: Friction and Shear  Recent Flowsheet Documentation  Taken 3/7/2025 0843 by Zeina Pickens RN  Friction/Shear Interventions: HOB 30 degrees or less  Intervention: Optimize Skin Protection  Recent Flowsheet Documentation  Taken 3/7/2025 1049 by Zeina Pickens RN  Activity Management: up in chair  Taken 3/7/2025 0843 by Zeina Pickens RN  Pressure Reduction Techniques: frequent weight shift encouraged  Skin Protection:   adhesive use limited   incontinence pads utilized  Activity Management: activity adjusted per tolerance     Problem: Infection  Goal: Absence of Infection Signs and Symptoms  Outcome: Adequate for Care Transition     Problem: Fall Injury Risk  Goal: Absence of Fall and Fall-Related Injury  Outcome: Adequate for Care Transition  Intervention: Identify and Manage Contributors  Recent Flowsheet  Documentation  Taken 3/7/2025 0843 by Zeina Pickens RN  Medication Review/Management: medications reviewed  Intervention: Promote Injury-Free Environment  Recent Flowsheet Documentation  Taken 3/7/2025 0843 by Zeina Pickens RN  Safety Promotion/Fall Prevention:   patient and family education   nonskid shoes/slippers when out of bed   lighting adjusted   clutter free environment maintained   safety round/check completed   activity supervised     Problem: Delirium  Goal: Optimal Coping  Outcome: Adequate for Care Transition  Goal: Improved Behavioral Control  Outcome: Adequate for Care Transition  Intervention: Minimize Safety Risk  Recent Flowsheet Documentation  Taken 3/7/2025 0843 by Zeina Pickens RN  Enhanced Safety Measures: review medications for side effects with activity  Goal: Improved Attention and Thought Clarity  Outcome: Adequate for Care Transition  Goal: Improved Sleep  Outcome: Adequate for Care Transition

## 2025-03-07 NOTE — DISCHARGE SUMMARY
[facility]    Discharge Summary  Hospitalist    Date of Admission:  2/28/2025  Date of Discharge:  3/7/2025  Discharging Provider: Fritz Allen MD, MD  Date of Service (when I saw the patient): 03/07/25    Discharge Diagnoses     Recurrent rectal bleeding  Acute on chronic anemia/ Acute on Chronic Blood Loss Anemia      Neutropenia without fever     Pancytopenia  Myelodysplastic syndrome  Breast cancer     Acute hypoxic respiratory failure  Acute on chronic heart failure exacerbation  Complicated by severe mitral regurgitation     Possible acute gouty flare?     Severe mitral regurgitation secondary to possible torn chordae     Atrial fibrillation, chronic     Chronic hyponatremia     CKD stage IIIb     # Hyponatremia: Lowest Na = 130 mmol/L in last 2 days, will monitor as appropriate         # Thrombocytopenia: Lowest platelets = 25 in last 2 days, will monitor for bleeding   # Hypertension: Noted on problem list         History of Present Illness   Kayley Putnam is an 89 year old female who presented with rectal bleeding. Please see hospital course for details.     Hospital Course   Active Problems:    Thrombocytopenia    Lower GI bleed    Myelodysplastic syndrome (H)    Anemia due to blood loss, acute     Kayley Putnam is a 89 year old female with a past medical history significant for MDS complicated by pancytopenia, breast cancer, severe mitral regurgitation, atrial fibrillation, not on anticoagulation, CKD, chronic hyponatremia who presented to the ER with complaint of bright red blood per rectum and was found to have acute on chronic anemia.      Recurrent rectal bleeding  Acute on chronic anemia/ Acute on Chronic Blood Loss Anemia   -Received 1 unit of platelet and 1 unit of PRBCs in the ER.     -Gastroenterology was consulted and it was felt that she is not a good candidate for endoscopies  -GI recommended supportive care  -Received 1 unit PRBC ordered for 3/3/25. Plan to  give Lasix with transfusion.   -Hgb 8.2 today     Neutropenia without fever  -Antibiotics discontinued as her clinical picture appears more consistent with fluid overload  -Monitor and add antibiotics if needed.     Pancytopenia  Myelodysplastic syndrome  Breast cancer  Follows [facility] outpatient (Dr. Norton)  Patient is receiving Luspatercept every 3 weeks.  -Chronic pancytopenia in the setting of progressing MDS.    -Received 2 unit PRBC, 1 unit platelets during this admission  -Hgb 8.2 today.   -Patient to follow-up with oncology. Per oncology, okay to hold Luspatercept injection while in TCU      Acute hypoxic respiratory failure  Acute on chronic heart failure exacerbation  Complicated by severe mitral regurgitation  CXR shows bilateral bronchial wall thickening with mid and lower lung predominant reticulonodular opacities.    Azithromycin and ceftriaxone were initiated-discontinued today. Does not appear consistent with pneumonia.    -Received Lasix with transfusion  -Procalcitonin 0.40.  BNP 3539.  -ECHO reviewed from 1/14/2025 with EF of 60 to 65%. Possible torn chordae. Severe 4+ mitral regurgitation.  Moderate to severe 2-3+ tricuspid regurgitation.     Possible acute gouty flare?  -Pain with palpation to anterior left ankle.  -Completed 5 day course of prednisone.     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  Given her advanced age and pancytopenia she was not a surgical candidate and mitral valve was not favorable for MitraClip given significant mitral calcification and mild stenosis.    -Cardiology recommended medical therapy with rate control of her A-fib and diuretics.   -continue home metoprolol 6.25 mg twice daily with holding parameters     Atrial fibrillation, chronic  -Not on anticoagulation in the setting of pancytopenia  -tolerating PTA coreg 6.25mg po bid  at this time     Chronic hyponatremia  Sodium 134 today     CKD stage IIIb  Creatinine stable at 1.02.      # Hyponatremia: Lowest Na = 134 today  # Thrombocytopenia: Lowest platelets = 32 today  # Hypertension: Noted on problem list       Transferred to transitional care unit.       Significant Results and Procedures   Results for orders placed or performed during the hospital encounter of 02/28/25   XR Chest 2 Views    Narrative    EXAM: XR CHEST 2 VIEWS  LOCATION: [facility]  DATE: 3/1/2025    INDICATION: Hypoxia.  COMPARISON: 1/17/2025      Impression    IMPRESSION: Left IJ Port-A-Cath tip in low SVC. Stable cardiomegaly. Bilateral bronchial wall thickening with mid and lower lung predominant reticulonodular opacities and bibasilar airspace opacities suggesting bronchopneumonia. New small left pleural   effusion. Left breast calcifications redemonstrated.         Pending Results   None    Code Status   Full Code       Primary Care Physician   Taj Gordillo      Discharge Disposition   Discharged to TCU  Condition at discharge: Stable    Consultations This Hospital Stay   GASTROENTEROLOGY IP CONSULT  CARE MANAGEMENT / SOCIAL WORK IP CONSULT  PHYSICAL THERAPY ADULT IP CONSULT  CARE MANAGEMENT / SOCIAL WORK IP CONSULT  HEMATOLOGY & ONCOLOGY IP CONSULT  SOCIAL WORK IP CONSULT    Time Spent on this Encounter       Discharge Orders      Reason for your hospital stay    You were admitted with bloody stools that have resolved     Follow-up and recommended labs and tests     F/up with PCP within 2 wk for hospital f/up   F/up with Dr. Norton as previously scheduled ?3/11/25 with preclinical CBC     Activity    Your activity upon discharge: activity as tolerated     General info for SNF    Length of Stay Estimate: Short Term Care: Estimated # of Days <30  Condition at Discharge: Stable  Level of care:skilled   Rehabilitation Potential: Excellent  Admission H&P remains valid and up-to-date:  Yes  Recent Chemotherapy: N/A  Use Nursing Home Standing Orders: Yes     Mantoux instructions    Give two-step Mantoux (PPD) Per Facility Policy Yes     Follow Up and recommended labs and tests    Follow up with Nursing home physician.    Follow up with hem/onc as scheduled     Reason for your hospital stay    Anemia     Activity - Up ad mazin     Diet    Follow this diet upon discharge:low fiber diet until PCP f/up     Diet    Follow this diet upon discharge: Current Diet:Orders Placed This Encounter      Low Fiber Diet      Diet     Discharge Medications   Current Discharge Medication List        CONTINUE these medications which have CHANGED    Details   metoprolol tartrate (LOPRESSOR) 25 MG tablet Take 0.25 tablets (6.25 mg) by mouth 2 times daily.    Associated Diagnoses: Persistent atrial fibrillation (H)           CONTINUE these medications which have NOT CHANGED    Details   acetaminophen (TYLENOL) 500 MG tablet Take 500 mg by mouth at bedtime.      atorvastatin (LIPITOR) 20 MG tablet Take 20 mg by mouth At Bedtime       furosemide (LASIX) 20 MG tablet Take 20 mg by mouth daily.      letrozole (FEMARA) 2.5 MG tablet Take 1 tablet by mouth daily.      luspatercept-aamt (REBLOZYL) 25 MG injection Inject 1 mg/kg subcutaneously every 21 days.      spironolactone (ALDACTONE) 25 MG tablet Take 25 mg by mouth daily.           Allergies   Allergies   Allergen Reactions    Amlodipine Dizziness    Codeine Nausea    Flecainide Dizziness    Hydrochlorothiazide Other (See Comments)     Other reaction(s): Hyponatremia    Meperidine Nausea and Vomiting    Promethazine      Jittery    Ceftin [Cefuroxime] Itching and Rash    Tetracycline Rash    Vancomycin Itching and Rash     Data

## 2025-03-07 NOTE — PLAN OF CARE
"/50  Pulse 63   Temp 98.5  F  Resp 20   SpO2 92%     Patient is alert and oriented x4, no complaints of pain, assist of 1 with gait belt and walker, purewick in place, slept well during the night, plan is to discharge today to TCU    Goal Outcome Evaluation:      Plan of Care Reviewed With: patient    Overall Patient Progress: improvingOverall Patient Progress: improving    Outcome Evaluation: Patient is alert and oriented x4, no complaints of pain, assist of 1 with gait belt and walker, purewick in place, slept well during the night, plan is to discharge today to TCU      Problem: Adult Inpatient Plan of Care  Goal: Plan of Care Review  Description: The Plan of Care Review/Shift note should be completed every shift.  The Outcome Evaluation is a brief statement about your assessment that the patient is improving, declining, or no change.  This information will be displayed automatically on your shift  note.  Outcome: Progressing  Flowsheets (Taken 3/7/2025 3179)  Outcome Evaluation: Patient is alert and oriented x4, no complaints of pain, assist of 1 with gait belt and walker, purewick in place, slept well during the night, plan is to discharge today to TCU  Plan of Care Reviewed With: patient  Overall Patient Progress: improving  Goal: Patient-Specific Goal (Individualized)  Description: You can add care plan individualizations to a care plan. Examples of Individualization might be:  \"Parent requests to be called daily at 9am for status\", \"I have a hard time hearing out of my right ear\", or \"Do not touch me to wake me up as it startles  me\".  Outcome: Progressing  Goal: Absence of Hospital-Acquired Illness or Injury  Outcome: Progressing  Intervention: Identify and Manage Fall Risk  Recent Flowsheet Documentation  Taken 3/6/2025 1956 by Kaylen Conner, RN  Safety Promotion/Fall Prevention:   activity supervised   clutter free environment maintained   lighting adjusted   nonskid shoes/slippers when out of " bed   patient and family education   room near nurse's station   room organization consistent   safety round/check completed  Intervention: Prevent Skin Injury  Recent Flowsheet Documentation  Taken 3/6/2025 1956 by Kaylen Conner RN  Body Position: position changed independently  Intervention: Prevent and Manage VTE (Venous Thromboembolism) Risk  Recent Flowsheet Documentation  Taken 3/6/2025 1956 by Kaylen Conner, RN  VTE Prevention/Management: SCDs off (sequential compression devices)  Intervention: Prevent Infection  Recent Flowsheet Documentation  Taken 3/6/2025 1956 by Kaylne Conner, RN  Infection Prevention:   cohorting utilized   hand hygiene promoted   rest/sleep promoted   single patient room provided  Goal: Optimal Comfort and Wellbeing  Outcome: Progressing  Intervention: Monitor Pain and Promote Comfort  Recent Flowsheet Documentation  Taken 3/6/2025 1956 by Kaylen Conner RN  Pain Management Interventions: declines  Goal: Readiness for Transition of Care  Outcome: Progressing

## 2025-03-07 NOTE — PLAN OF CARE
"Physical Therapy Discharge Summary     Reason for therapy discharge:    Discharged to transitional care facility.     Progress towards therapy goal(s). See goals on Care Plan in [facility] electronic health record for goal details.  Goals not met.  Barriers to achieving goals:  discharge to TCU   Therapy recommendation(s):    Continued therapy is recommended.  Rationale/Recommendations: Per last treating therapist   \"Pt is well below baseline level of moblity, she is currently unable to ambulate d/t pain in L foot; Patient lives alone in an ILF, unable to return home safely. Pt requires TCU to progress independence with mobility, activity tolerance, strength, and balance. \"  "

## 2025-03-07 NOTE — PROGRESS NOTES
Care Management Discharge Note    Discharge Date: 03/07/2025       Discharge Disposition: TCU    Discharge DME:  none    Discharge Transportation: agency    Private pay costs discussed: transportation costs    Does the patient's insurance plan have a 3 day qualifying hospital stay waiver?  No    PAS Confirmation Code: [id number]  Patient/family educated on Medicare website which has current facility and service quality ratings:  yes    Education Provided on the Discharge Plan:  yes  Persons Notified of Discharge Plans: patient, daughter  Patient/Family in Agreement with the Plan:  yes    Handoff Referral Completed: No, handoff not indicated or clinically appropriate    Additional Information:  Patient discharging to [facility] TCU today. Transport is between [id number].    Discharge orders faxed to [facility]. Updated admissions that orders faxed and verified transport time.     Emmy Fishman RN  Care Coordinator  [facility]

## 2025-03-09 ENCOUNTER — LAB REQUISITION (OUTPATIENT)
Dept: LAB | Facility: CLINIC | Age: OVER 89
End: 2025-03-09
Payer: MEDICARE

## 2025-03-09 DIAGNOSIS — Z11.1 ENCOUNTER FOR SCREENING FOR RESPIRATORY TUBERCULOSIS: ICD-10-CM

## 2025-03-09 NOTE — PROGRESS NOTES
[facility] GERIATRICS  Initial Visit Note  March 10, 2025       PRIMARY CARE PROVIDER AND CLINIC:  Taj Gordillo MD, [address] [address]  / [place] [id number]    [facility] Medical Record Number:  [id number]  Place of Service where encounter took place:  No question data found.    No chief complaint on file.         HPI:    Kayley Putnam is a 89 year old  (7/9/1933) admitted to No question data found. from {LOCATION OF NURSING HOME ADMISSIONS:825829}.     Reviewed ED note, H&P, discharge summary, labs, and imaging from recent hospital stay       Hospital stay: 2/28/25-3/7/25, [facility]    Hospital Summary:  89 year old female with hx of chronic pancytopenia due to MDS, chronic a-fib not on AC due to pancytopenia, multivalvular heart disease including severe MR with suspected torn chordae, and chronic hyponatremia who was hospitalized at [facility] 2/28-3/7/25 for rectal bleeding with acute blood loss anemia. Hgb gilbert 6.5 from baseline 7-8. Platelets 20-30k. GI consulted - recommended supportive care alone. Pt received total 2u pRBCs and 1u platelets during hospital stay. Hospital course complicated by acute hypoxic respiratory failure ultimately attributed to HFpEF in the setting of transfusions. Received IV Lasix with improvement. Was also briefly treated with empiric ceftriaxone and azithromycin for pneumonia. Also completed course of prednisone for possible gout flare of left ankle during hospital stay. Weaned off oxygen prior to discharge. Hgb stable 7-8 at discharge; platelets mid 20k range at hospital discharge. Referred to TCU for rehab, med management, and nursing care. Was previously living alone in an independent living facility.       Med changes at hospital discharge:   - ***    Admitted to this facility for: rehab, medical management, and nursing care.    Patient's prior living condition: lives alone, independent living facility    Today, patient was seen as an initial visit. Facility  records reviewed. VSS. spO2 mid 90s on room air. Weights stable (~142 lb). Requiring A1 with transfers/ADLs.       CODE STATUS: DNR / DNI ***    ALLERGIES:   Allergies   Allergen Reactions    Amlodipine Dizziness    Codeine Nausea    Flecainide Dizziness    Hydrochlorothiazide Other (See Comments)     Other reaction(s): Hyponatremia    Meperidine Nausea and Vomiting    Promethazine      Jittery    Ceftin [Cefuroxime] Itching and Rash    Tetracycline Rash    Vancomycin Itching and Rash        REVIEW OF SYSTEMS:   6 point ROS was completed. Pertinent positives and negatives are noted above in the HPI. All others negative    PAST MEDICAL HISTORY:   Past Medical History:   Diagnosis Date    Anemia     Atherosclerosis of aorta     Atrial fibrillation     Breast cancer     CAD (coronary artery disease)     CHF (congestive heart failure)     Chronic kidney disease     Hyperlipidemia     Hypertension     Mitral regurgitation     Myelodysplastic syndrome     Osteoarthritis         PAST SURGICAL HISTORY:     has a past surgical history that includes Irrigation and debridement breast (Right, 02/22/2020); Irrigation and debridement breast (Right, 02/22/2020); appendectomy (1958); Lumpectomy Breast, Seed Localization, Irene Node (Right, 03/04/2020); IR Chest Port Placement > 5 Yrs of Age (05/24/2022); Tonsillectomy, adenoidectomy, combined; Arthroplasty knee (Bilateral); and Hysterectomy.    FAMILY HISTORY:   Reviewed and noncontributory. Unable to review due to cognitive impairment ***    SOCIAL HISTORY:    Patient's living condition: {LIVES WITH (NURSING HOME):726038}   reports that she has never smoked. She has never used smokeless tobacco. She reports current alcohol use. She reports that she does not use drugs.    Post Discharge Medication Reconciliation Status:   MED REC REQUIRED{TIP  Click the link below to document or use med rec list, use list to pull in response :393771}  Post Medication Reconciliation Status: {MED  REC LIST:890527}     Current Outpatient Medications   Medication Sig Dispense Refill    acetaminophen (TYLENOL) 500 MG tablet Take 500 mg by mouth at bedtime.      atorvastatin (LIPITOR) 20 MG tablet Take 20 mg by mouth At Bedtime       furosemide (LASIX) 20 MG tablet Take 20 mg by mouth daily.      letrozole (FEMARA) 2.5 MG tablet Take 1 tablet by mouth daily.      luspatercept-aamt (REBLOZYL) 25 MG injection Inject 1 mg/kg subcutaneously every 21 days.      metoprolol tartrate (LOPRESSOR) 25 MG tablet Take 0.25 tablets (6.25 mg) by mouth 2 times daily.      spironolactone (ALDACTONE) 25 MG tablet Take 25 mg by mouth daily.       No current facility-administered medications for this visit.       PHYSICAL EXAM:  There were no vitals taken for this visit. ***  Constitutional: Resting in bed, NAD  HEENT: Sclera white, MMM  Respiratory: Breathing non-labored. Lungs CTAB - no wheezes, crackles, or rhonchi  Cardiovascular: Heart RRR, no m/r/g. No pedal edema  GI: +BS, abd soft/NT  : Lee catheter ***  Skin/Integument: No rash  Musculoskeletal: Normal muscle bulk and tone  Neuro: Alert and appropriate, ALVAREZ  Psych: Calm and cooperative    LAB/IMAGING DATA:  Reviewed in Epic and/or [facility]  {fgslab:724423}      ASSESSMENT/PLAN:    Acute blood loss anemia  Rectal bleeding: Resolved  Physical deconditioning  Presented with rectal bleeding. Hgb 6.7 on admission, baseline Hgb 7-8. GI consulted - did not feel patient was a candidate for procedures; supportive management recommended. Received total 2u pRBCs and 1u platelets during hospital stay. Hgb stable 8.2, platelets 20k range at hospital discharge.  - Repeat CBC on 3/12/25  - cont PT/OT  - SW for discharge planning    Chronic pancytopenia due to myelodysplastic syndrome  Breast cancer  Follows with Dr. Norton at [facility]. Receives Luspatercept every 3 weeks.  - Luspatercept on hold while at U  - repeat CBC on 3/12/25     Acute hypoxic respiratory  failure: Resolved  Acute on chronic HFpEF  Severe mitral regurgitation due to possible torn chordae  Mod-severe tricuspic regurgitation  Moderate aortic stenosis  *TTE (1/14/25): EF of 60 to 65%. Possible torn chordae. Severe 4+ mitral regurgitation with Moderate to severe 2-3+ tricuspid regurgitation. Moderate aortic stenosis. Mild aortic regurgitation. Previously seen in consult with Cardiology; not a surgical candidate due to age and underlying pancytopenia; mitral valve not favorable for MitraClip given significant mitral calcification and mild stenosis.    *Became hypoxic during hospital stay in the setting of transfusions. Treated with IV Lasix with improvement. On room air at hospital discharge  - Remains on room air   - ***    Chronic atrial fibrillation  Chronic, stable. Conts on PTA metoprolol 6.25 mg BID. Not on anticoagulation due to pancytopenia     Possible acute goute flare  Noted inpatient. Completed 5-d course of prednisone inpatient     Chronic hyponatremia  Na 130-134 inpatient  - repeat BMP on 3/12/25 and then prn if stable     CKD stage IIIa  Creatinine has been stable within baseline 1-1.2  - repeat BMP on 3/12/25 and then prn if stable     Orders: ***    Total time spent with patient visit at the skilled nursing facility was *** min including patient visit and review of past records.     Electronically signed by:  Brianna Oro MD

## 2025-03-10 ENCOUNTER — TRANSITIONAL CARE UNIT VISIT (OUTPATIENT)
Dept: GERIATRICS | Facility: CLINIC | Age: OVER 89
End: 2025-03-10
Payer: MEDICARE

## 2025-03-10 VITALS
OXYGEN SATURATION: 95 % | RESPIRATION RATE: 18 BRPM | HEART RATE: 70 BPM | TEMPERATURE: 98.3 F | HEIGHT: 64 IN | BODY MASS INDEX: 24.24 KG/M2 | WEIGHT: 142 LBS | DIASTOLIC BLOOD PRESSURE: 63 MMHG | SYSTOLIC BLOOD PRESSURE: 117 MMHG

## 2025-03-10 DIAGNOSIS — D62 ANEMIA DUE TO BLOOD LOSS, ACUTE: Primary | ICD-10-CM

## 2025-03-10 DIAGNOSIS — I50.33 ACUTE ON CHRONIC HEART FAILURE WITH PRESERVED EJECTION FRACTION (HFPEF) (H): ICD-10-CM

## 2025-03-10 DIAGNOSIS — T45.1X5A ANTINEOPLASTIC CHEMOTHERAPY INDUCED PANCYTOPENIA: ICD-10-CM

## 2025-03-10 DIAGNOSIS — E87.1 HYPONATREMIA: ICD-10-CM

## 2025-03-10 DIAGNOSIS — I48.20 CHRONIC ATRIAL FIBRILLATION (H): ICD-10-CM

## 2025-03-10 DIAGNOSIS — N18.31 CKD STAGE 3A, GFR 45-59 ML/MIN (H): ICD-10-CM

## 2025-03-10 DIAGNOSIS — R53.81 PHYSICAL DECONDITIONING: ICD-10-CM

## 2025-03-10 DIAGNOSIS — D61.810 ANTINEOPLASTIC CHEMOTHERAPY INDUCED PANCYTOPENIA: ICD-10-CM

## 2025-03-10 DIAGNOSIS — I34.0 SEVERE MITRAL REGURGITATION: ICD-10-CM

## 2025-03-10 DIAGNOSIS — J96.21 ACUTE AND CHRONIC RESPIRATORY FAILURE WITH HYPOXIA (H): ICD-10-CM

## 2025-03-10 DIAGNOSIS — D46.9 MYELODYSPLASTIC SYNDROME (H): ICD-10-CM

## 2025-03-10 DIAGNOSIS — K62.5 RECTAL BLEEDING: ICD-10-CM

## 2025-03-10 PROBLEM — N18.32 CHRONIC KIDNEY DISEASE, STAGE 3B (H): Status: ACTIVE | Noted: 2025-03-10

## 2025-03-10 PROBLEM — D61.818 PANCYTOPENIA (H): Status: ACTIVE | Noted: 2025-01-14

## 2025-03-10 PROBLEM — I50.9 CONGESTIVE HEART FAILURE, UNSPECIFIED HF CHRONICITY, UNSPECIFIED HEART FAILURE TYPE (H): Status: ACTIVE | Noted: 2025-03-10

## 2025-03-10 PROBLEM — I50.9 CONGESTIVE HEART FAILURE, UNSPECIFIED HF CHRONICITY, UNSPECIFIED HEART FAILURE TYPE (H): Status: RESOLVED | Noted: 2025-03-10 | Resolved: 2025-03-10

## 2025-03-10 PROBLEM — D61.818 PANCYTOPENIA (H): Status: RESOLVED | Noted: 2025-01-14 | Resolved: 2025-03-10

## 2025-03-10 PROBLEM — N18.32 CHRONIC KIDNEY DISEASE, STAGE 3B (H): Status: RESOLVED | Noted: 2025-03-10 | Resolved: 2025-03-10

## 2025-03-10 PROCEDURE — 36415 COLL VENOUS BLD VENIPUNCTURE: CPT | Mod: ORL | Performed by: NURSE PRACTITIONER

## 2025-03-10 PROCEDURE — 86481 TB AG RESPONSE T-CELL SUSP: CPT | Mod: ORL | Performed by: NURSE PRACTITIONER

## 2025-03-10 PROCEDURE — 99305 1ST NF CARE MODERATE MDM 35: CPT | Performed by: INTERNAL MEDICINE

## 2025-03-10 PROCEDURE — P9604 ONE-WAY ALLOW PRORATED TRIP: HCPCS | Mod: ORL | Performed by: NURSE PRACTITIONER

## 2025-03-10 NOTE — PROGRESS NOTES
[facility] GERIATRICS  Initial Visit Note  March 10, 2025       PRIMARY CARE PROVIDER AND CLINIC:  Taj Gordillo MD, [address]  / NATALI CAREY [id number]    [place] Medical Record Number:  [id number]  Place of Service where encounter took place:  [facility]  (U) [[id number]]    Chief Complaint   Patient presents with    Hospital F/U          HPI:    Kayley Putnam is a 89 year old  (7/9/1933) admitted to [facility]  (U) [[id number]] from [facility].     Reviewed ED note, H&P, discharge summary, labs, and imaging from recent hospital stay       Hospital stay: 2/28/25-3/7/25, [facility] Summary:  89 year old female with hx of chronic pancytopenia due to MDS, chronic a-fib not on AC due to pancytopenia, multivalvular heart disease including severe MR with suspected torn chordae, and chronic hyponatremia who was hospitalized at [facility] 2/28-3/7/25 for rectal bleeding with acute blood loss anemia. Hgb gilbert 6.5 from baseline 7-8. Platelets 20-30k. GI consulted - recommended supportive care alone. Pt received total 2u pRBCs and 1u platelets during hospital stay. Hospital course complicated by acute hypoxic respiratory failure ultimately attributed to HFpEF in the setting of transfusions. Received IV Lasix with improvement. Was also briefly treated with empiric ceftriaxone and azithromycin for pneumonia. Also completed course of prednisone for possible gout flare of left ankle during hospital stay. Weaned off oxygen prior to discharge. Hgb stable 7-8 at discharge; platelets mid 20k range at hospital discharge. Referred to U for rehab, med management, and nursing care. Was previously living alone in an independent living facility.       Admitted to this facility for: rehab, medical management, and nursing care.    Patient's prior living condition: lives alone, independent living facility    Today, patient was seen as an initial  visit. Facility records reviewed. VSS. spO2 mid 90s on room air. Weights stable (~142 lb). Requiring A1 with transfers/ADLs. Patient was seen in her room, resting in bed. She offers no concerns. Reports dyspnea on exertion which is chronic and stable. Otherwise denies cp, dizziness/lightheadedness, or increased LE edema. Denies black/bloody stools since arrival to TCU. Confirmed code status - DNR/DNI. POLST form signed.       CODE STATUS: DNR / DNI    ALLERGIES:   Allergies   Allergen Reactions    Amlodipine Dizziness    Codeine Nausea    Flecainide Dizziness    Hydrochlorothiazide Other (See Comments)     Other reaction(s): Hyponatremia    Meperidine Nausea and Vomiting    Promethazine      Jittery    Ceftin [Cefuroxime] Itching and Rash    Tetracycline Rash    Vancomycin Itching and Rash        REVIEW OF SYSTEMS:   6 point ROS was completed. Pertinent positives and negatives are noted above in the HPI. All others negative    PAST MEDICAL HISTORY:   Past Medical History:   Diagnosis Date    Anemia     Atherosclerosis of aorta     Atrial fibrillation     Breast cancer     CAD (coronary artery disease)     CHF (congestive heart failure)     Chronic kidney disease     Hyperlipidemia     Hypertension     Mitral regurgitation     Myelodysplastic syndrome     Osteoarthritis         PAST SURGICAL HISTORY:     has a past surgical history that includes Irrigation and debridement breast (Right, 02/22/2020); Irrigation and debridement breast (Right, 02/22/2020); appendectomy (1958); Lumpectomy Breast, Seed Localization, [place] Node (Right, 03/04/2020); IR Chest Port Placement > 5 Yrs of Age (05/24/2022); Tonsillectomy, adenoidectomy, combined; Arthroplasty knee (Bilateral); and Hysterectomy.    FAMILY HISTORY:   Reviewed and noncontributory.     SOCIAL HISTORY:    Patient's living condition: lives alone   reports that she has never smoked. She has never used smokeless tobacco. She reports current alcohol use. She reports  "that she does not use drugs.    Post Discharge Medication Reconciliation Status:   MED REC REQUIRED  Post Medication Reconciliation Status: discharge medications reconciled, continue medications without change     Current Outpatient Medications   Medication Sig Dispense Refill    acetaminophen (TYLENOL) 500 MG tablet Take 500 mg by mouth at bedtime.      atorvastatin (LIPITOR) 20 MG tablet Take 20 mg by mouth At Bedtime       furosemide (LASIX) 20 MG tablet Take 20 mg by mouth daily.      letrozole (FEMARA) 2.5 MG tablet Take 1 tablet by mouth daily.      luspatercept-aamt (REBLOZYL) 25 MG injection Inject 1 mg/kg subcutaneously every 21 days.      metoprolol tartrate (LOPRESSOR) 25 MG tablet Take 0.25 tablets (6.25 mg) by mouth 2 times daily.      spironolactone (ALDACTONE) 25 MG tablet Take 25 mg by mouth daily.       No current facility-administered medications for this visit.       PHYSICAL EXAM:  /63   Pulse 70   Temp 98.3  F (36.8  C)   Resp 18   Ht 1.626 m (5' 4\")   Wt 64.4 kg (142 lb)   SpO2 95%   BMI 24.37 kg/m     Constitutional: Resting in bed, NAD  HEENT: Sclera white, MMM  Respiratory: Breathing non-labored. Lungs CTAB - no wheezes, crackles, or rhonchi  Cardiovascular: Heart RRR, +harsh systolic murmur. Trace pedal edema  GI: +BS, abd soft/NT  Skin/Integument: No rash  Musculoskeletal: Normal muscle bulk and tone  Neuro: [place]. Alert and appropriate, ALVAREZ  Psych: Calm and cooperative    LAB/IMAGING DATA:  Reviewed in Epic and/or [facility]  Most Recent 3 CBC's:  Recent Labs   Lab Test 03/07/25  0644 03/06/25  0545 03/05/25  0620   WBC 2.3* 2.0* 1.8*   HGB 8.2* 7.6* 7.8*   * 104* 103*   PLT 32* 28* 25*     Most Recent 3 BMP's:  Recent Labs   Lab Test 03/07/25  0644 03/06/25  0545 03/05/25  0620   * 132* 130*   POTASSIUM 4.2 4.3 4.1   CHLORIDE 104 103 99   CO2 18* 22 22   BUN 49.4* 51.2* 50.2*   CR 1.01* 1.02* 1.07*   ANIONGAP 12 7 9   NATALIA 9.1 9.1 9.1   GLC 91 93 93 "         ASSESSMENT/PLAN:    Acute blood loss anemia  Rectal bleeding: Resolved  Physical deconditioning  Presented with rectal bleeding. Hgb 6.7 on admission, baseline Hgb 7-8. GI consulted - did not feel patient was a candidate for procedures; supportive management recommended. Received total 2u pRBCs and 1u platelets during hospital stay. Hgb stable 8.2, platelets 20k range at hospital discharge.  - Repeat CBC on 3/12/25  - cont PT/OT  - SW for discharge planning    Chronic pancytopenia due to myelodysplastic syndrome  Hx of breast cancer  Follows with Dr. Norton at [facility]. Receives Luspatercept every 3 weeks. Also on letrozole for breast cancer  - cont PTA luspatercept and letrozole  - repeat CBC on 3/12/25  - follow up with Heme/Onc at TCU discharge     Acute hypoxic respiratory failure: Resolved  Acute on chronic HFpEF  Severe mitral regurgitation due to possible torn chordae  Mod-severe tricuspic regurgitation  Moderate aortic stenosis  *TTE (1/14/25): EF of 60 to 65%. Possible torn chordae. Severe 4+ mitral regurgitation with Moderate to severe 2-3+ tricuspid regurgitation. Moderate aortic stenosis. Mild aortic regurgitation. Previously seen in consult with Cardiology; not a surgical candidate due to age and underlying pancytopenia; mitral valve not favorable for MitraClip given significant mitral calcification and mild stenosis.    *Became hypoxic during hospital stay in the setting of transfusions. Treated with IV Lasix with improvement. On room air at hospital discharge  *PTA meds: metoprolol 6.25 mg BID, spironolactone 25 mg daily  - Remains on room air   - cont PTA metoprolol and spironolactone  - Has follow up with Cardiology on 3/18/25    Chronic atrial fibrillation  Chronic, stable. Conts on PTA metoprolol 6.25 mg BID. Not on anticoagulation due to pancytopenia    Dyslipidemia  Chronic, stable. Conts on PTA atorvastatin 20 mg qhs     Possible acute goute flare  Noted inpatient. Completed  5-d course of prednisone inpatient     Chronic hyponatremia  Na 130-134 inpatient  - repeat BMP on 3/12/25 and then prn if stable     CKD stage IIIa  Creatinine has been stable within baseline 1-1.2  - repeat BMP on 3/12/25 and then prn if stable     Orders:   Daily weights  CBC and BMP on 3/12/25    Electronically signed by:  Brianna Oro MD

## 2025-03-10 NOTE — LETTER
3/10/2025      Kayley Putnam  [address]  [place] [address]        [facility] GERIATRICS  Initial Visit Note  March 10, 2025       PRIMARY CARE PROVIDER AND CLINIC:  Taj Gordillo MD, [address]  / [place] CAREY [address]    [place] Medical Record Number:  [id number]  Place of Service where encounter took place:  [facility]  (U) [[id number]]    Chief Complaint   Patient presents with     Hospital F/U          HPI:    Kayley Putnam is a 89 year old  (7/9/1933) admitted to [facility]  ([facility]) [[id number]] from [facility].     Reviewed ED note, H&P, discharge summary, labs, and imaging from recent hospital stay       Hospital stay: 2/28/25-3/7/25, [facility] Summary:  89 year old female with hx of chronic pancytopenia due to MDS, chronic a-fib not on AC due to pancytopenia, multivalvular heart disease including severe MR with suspected torn chordae, and chronic hyponatremia who was hospitalized at [facility] 2/28-3/7/25 for rectal bleeding with acute blood loss anemia. Hgb gilbert 6.5 from baseline 7-8. Platelets 20-30k. GI consulted - recommended supportive care alone. Pt received total 2u pRBCs and 1u platelets during hospital stay. Hospital course complicated by acute hypoxic respiratory failure ultimately attributed to HFpEF in the setting of transfusions. Received IV Lasix with improvement. Was also briefly treated with empiric ceftriaxone and azithromycin for pneumonia. Also completed course of prednisone for possible gout flare of left ankle during hospital stay. Weaned off oxygen prior to discharge. Hgb stable 7-8 at discharge; platelets mid 20k range at hospital discharge. Referred to U for rehab, med management, and nursing care. Was previously living alone in an independent living facility.       Admitted to this facility for: rehab, medical management, and nursing care.    Patient's prior living  condition: lives alone, independent living facility    Today, patient was seen as an initial visit. Facility records reviewed. VSS. spO2 mid 90s on room air. Weights stable (~142 lb). Requiring A1 with transfers/ADLs. Patient was seen in her room, resting in bed. She offers no concerns. Reports dyspnea on exertion which is chronic and stable. Otherwise denies cp, dizziness/lightheadedness, or increased LE edema. Denies black/bloody stools since arrival to TCU. Confirmed code status - DNR/DNI. POLST form signed.       CODE STATUS: DNR / DNI    ALLERGIES:   Allergies   Allergen Reactions     Amlodipine Dizziness     Codeine Nausea     Flecainide Dizziness     Hydrochlorothiazide Other (See Comments)     Other reaction(s): Hyponatremia     Meperidine Nausea and Vomiting     Promethazine      Jittery     Ceftin [Cefuroxime] Itching and Rash     Tetracycline Rash     Vancomycin Itching and Rash        REVIEW OF SYSTEMS:   6 point ROS was completed. Pertinent positives and negatives are noted above in the HPI. All others negative    PAST MEDICAL HISTORY:   Past Medical History:   Diagnosis Date     Anemia      Atherosclerosis of aorta      Atrial fibrillation      Breast cancer      CAD (coronary artery disease)      CHF (congestive heart failure)      Chronic kidney disease      Hyperlipidemia      Hypertension      Mitral regurgitation      Myelodysplastic syndrome      Osteoarthritis         PAST SURGICAL HISTORY:     has a past surgical history that includes Irrigation and debridement breast (Right, 02/22/2020); Irrigation and debridement breast (Right, 02/22/2020); appendectomy (1958); Lumpectomy Breast, Seed Localization, [place] Node (Right, 03/04/2020); IR Chest Port Placement > 5 Yrs of Age (05/24/2022); Tonsillectomy, adenoidectomy, combined; Arthroplasty knee (Bilateral); and Hysterectomy.    FAMILY HISTORY:   Reviewed and noncontributory.     SOCIAL HISTORY:    Patient's living condition: lives alone    "reports that she has never smoked. She has never used smokeless tobacco. She reports current alcohol use. She reports that she does not use drugs.    Post Discharge Medication Reconciliation Status:   MED REC REQUIRED  Post Medication Reconciliation Status: discharge medications reconciled, continue medications without change     Current Outpatient Medications   Medication Sig Dispense Refill     acetaminophen (TYLENOL) 500 MG tablet Take 500 mg by mouth at bedtime.       atorvastatin (LIPITOR) 20 MG tablet Take 20 mg by mouth At Bedtime        furosemide (LASIX) 20 MG tablet Take 20 mg by mouth daily.       letrozole (FEMARA) 2.5 MG tablet Take 1 tablet by mouth daily.       luspatercept-aamt (REBLOZYL) 25 MG injection Inject 1 mg/kg subcutaneously every 21 days.       metoprolol tartrate (LOPRESSOR) 25 MG tablet Take 0.25 tablets (6.25 mg) by mouth 2 times daily.       spironolactone (ALDACTONE) 25 MG tablet Take 25 mg by mouth daily.       No current facility-administered medications for this visit.       PHYSICAL EXAM:  /63   Pulse 70   Temp 98.3  F (36.8  C)   Resp 18   Ht 1.626 m (5' 4\")   Wt 64.4 kg (142 lb)   SpO2 95%   BMI 24.37 kg/m     Constitutional: Resting in bed, NAD  HEENT: Sclera white, MMM  Respiratory: Breathing non-labored. Lungs CTAB - no wheezes, crackles, or rhonchi  Cardiovascular: Heart RRR, +harsh systolic murmur. Trace pedal edema  GI: +BS, abd soft/NT  Skin/Integument: No rash  Musculoskeletal: Normal muscle bulk and tone  Neuro: [place]. Alert and appropriate, ALVAREZ  Psych: Calm and cooperative    LAB/IMAGING DATA:  Reviewed in Epic and/or [facility]  Most Recent 3 CBC's:  Recent Labs   Lab Test 03/07/25  0644 03/06/25  0545 03/05/25  0620   WBC 2.3* 2.0* 1.8*   HGB 8.2* 7.6* 7.8*   * 104* 103*   PLT 32* 28* 25*     Most Recent 3 BMP's:  Recent Labs   Lab Test 03/07/25  0644 03/06/25  0545 03/05/25  0620   * 132* 130*   POTASSIUM 4.2 4.3 4.1   CHLORIDE 104 103 99 "   CO2 18* 22 22   BUN 49.4* 51.2* 50.2*   CR 1.01* 1.02* 1.07*   ANIONGAP 12 7 9   NATALIA 9.1 9.1 9.1   GLC 91 93 93         ASSESSMENT/PLAN:    Acute blood loss anemia  Rectal bleeding: Resolved  Physical deconditioning  Presented with rectal bleeding. Hgb 6.7 on admission, baseline Hgb 7-8. GI consulted - did not feel patient was a candidate for procedures; supportive management recommended. Received total 2u pRBCs and 1u platelets during hospital stay. Hgb stable 8.2, platelets 20k range at hospital discharge.  - Repeat CBC on 3/12/25  - cont PT/OT  - SW for discharge planning    Chronic pancytopenia due to myelodysplastic syndrome  Hx of breast cancer  Follows with Dr. Norton at [facility]. Receives Luspatercept every 3 weeks. Also on letrozole for breast cancer  - cont PTA luspatercept and letrozole  - repeat CBC on 3/12/25  - follow up with Heme/Onc at TCU discharge     Acute hypoxic respiratory failure: Resolved  Acute on chronic HFpEF  Severe mitral regurgitation due to possible torn chordae  Mod-severe tricuspic regurgitation  Moderate aortic stenosis  *TTE (1/14/25): EF of 60 to 65%. Possible torn chordae. Severe 4+ mitral regurgitation with Moderate to severe 2-3+ tricuspid regurgitation. Moderate aortic stenosis. Mild aortic regurgitation. Previously seen in consult with Cardiology; not a surgical candidate due to age and underlying pancytopenia; mitral valve not favorable for MitraClip given significant mitral calcification and mild stenosis.    *Became hypoxic during hospital stay in the setting of transfusions. Treated with IV Lasix with improvement. On room air at hospital discharge  *PTA meds: metoprolol 6.25 mg BID, spironolactone 25 mg daily  - Remains on room air   - cont PTA metoprolol and spironolactone  - Has follow up with Cardiology on 3/18/25    Chronic atrial fibrillation  Chronic, stable. Conts on PTA metoprolol 6.25 mg BID. Not on anticoagulation due to  pancytopenia    Dyslipidemia  Chronic, stable. Conts on PTA atorvastatin 20 mg qhs     Possible acute goute flare  Noted inpatient. Completed 5-d course of prednisone inpatient     Chronic hyponatremia  Na 130-134 inpatient  - repeat BMP on 3/12/25 and then prn if stable     CKD stage IIIa  Creatinine has been stable within baseline 1-1.2  - repeat BMP on 3/12/25 and then prn if stable     Orders:   Daily weights  CBC and BMP on 3/12/25    Electronically signed by:  Brianna Oro MD                      Sincerely,        Brianna Oro MD    Electronically signed

## 2025-03-11 ENCOUNTER — LAB REQUISITION (OUTPATIENT)
Dept: LAB | Facility: CLINIC | Age: OVER 89
End: 2025-03-11
Payer: MEDICARE

## 2025-03-11 DIAGNOSIS — N18.4 CHRONIC KIDNEY DISEASE, STAGE 4 (SEVERE) (H): ICD-10-CM

## 2025-03-11 DIAGNOSIS — D61.818 OTHER PANCYTOPENIA (H): ICD-10-CM

## 2025-03-11 LAB
GAMMA INTERFERON BACKGROUND BLD IA-ACNC: 0.03 IU/ML
M TB IFN-G BLD-IMP: NEGATIVE
M TB IFN-G CD4+ BCKGRND COR BLD-ACNC: 1.93 IU/ML
MITOGEN IGNF BCKGRD COR BLD-ACNC: 0 IU/ML
MITOGEN IGNF BCKGRD COR BLD-ACNC: 0 IU/ML
QUANTIFERON MITOGEN: 1.96 IU/ML
QUANTIFERON NIL TUBE: 0.03 IU/ML
QUANTIFERON TB1 TUBE: 0.03 IU/ML
QUANTIFERON TB2 TUBE: 0.03

## 2025-03-12 LAB
ANION GAP SERPL CALCULATED.3IONS-SCNC: 10 MMOL/L (ref 7–15)
BUN SERPL-MCNC: 49.8 MG/DL (ref 8–23)
CALCIUM SERPL-MCNC: 9 MG/DL (ref 8.8–10.4)
CHLORIDE SERPL-SCNC: 101 MMOL/L (ref 98–107)
CREAT SERPL-MCNC: 1.16 MG/DL (ref 0.51–0.95)
EGFRCR SERPLBLD CKD-EPI 2021: 44 ML/MIN/1.73M2
ERYTHROCYTE [DISTWIDTH] IN BLOOD BY AUTOMATED COUNT: ABNORMAL %
FRAGMENTS BLD QL SMEAR: ABNORMAL
GLUCOSE SERPL-MCNC: 92 MG/DL (ref 70–99)
HCO3 SERPL-SCNC: 22 MMOL/L (ref 22–29)
HCT VFR BLD AUTO: 25.2 % (ref 35–47)
HGB BLD-MCNC: 8.1 G/DL (ref 11.7–15.7)
MCH RBC QN AUTO: 34.3 PG (ref 26.5–33)
MCHC RBC AUTO-ENTMCNC: 32.1 G/DL (ref 31.5–36.5)
MCV RBC AUTO: 107 FL (ref 78–100)
PLAT MORPH BLD: ABNORMAL
PLATELET # BLD AUTO: 38 10E3/UL (ref 150–450)
POLYCHROMASIA BLD QL SMEAR: SLIGHT
POTASSIUM SERPL-SCNC: 4.5 MMOL/L (ref 3.4–5.3)
RBC # BLD AUTO: 2.36 10E6/UL (ref 3.8–5.2)
RBC MORPH BLD: ABNORMAL
SODIUM SERPL-SCNC: 133 MMOL/L (ref 135–145)
WBC # BLD AUTO: 1.4 10E3/UL (ref 4–11)

## 2025-03-12 PROCEDURE — 80048 BASIC METABOLIC PNL TOTAL CA: CPT | Mod: ORL | Performed by: NURSE PRACTITIONER

## 2025-03-12 PROCEDURE — 85027 COMPLETE CBC AUTOMATED: CPT | Mod: ORL | Performed by: NURSE PRACTITIONER

## 2025-03-12 PROCEDURE — P9603 ONE-WAY ALLOW PRORATED MILES: HCPCS | Mod: ORL | Performed by: NURSE PRACTITIONER

## 2025-03-12 PROCEDURE — 36415 COLL VENOUS BLD VENIPUNCTURE: CPT | Mod: ORL | Performed by: NURSE PRACTITIONER

## 2025-03-13 ENCOUNTER — TRANSITIONAL CARE UNIT VISIT (OUTPATIENT)
Dept: GERIATRICS | Facility: CLINIC | Age: OVER 89
End: 2025-03-13
Payer: MEDICARE

## 2025-03-13 VITALS
SYSTOLIC BLOOD PRESSURE: 124 MMHG | WEIGHT: 128 LBS | DIASTOLIC BLOOD PRESSURE: 63 MMHG | RESPIRATION RATE: 16 BRPM | HEART RATE: 61 BPM | TEMPERATURE: 98 F | BODY MASS INDEX: 21.85 KG/M2 | HEIGHT: 64 IN | OXYGEN SATURATION: 95 %

## 2025-03-13 DIAGNOSIS — Z85.3 HISTORY OF BREAST CANCER: ICD-10-CM

## 2025-03-13 DIAGNOSIS — I48.20 CHRONIC ATRIAL FIBRILLATION (H): ICD-10-CM

## 2025-03-13 DIAGNOSIS — T45.1X5A ANTINEOPLASTIC CHEMOTHERAPY INDUCED PANCYTOPENIA: ICD-10-CM

## 2025-03-13 DIAGNOSIS — R53.81 PHYSICAL DECONDITIONING: ICD-10-CM

## 2025-03-13 DIAGNOSIS — R41.89 COGNITIVE IMPAIRMENT: ICD-10-CM

## 2025-03-13 DIAGNOSIS — K59.01 SLOW TRANSIT CONSTIPATION: ICD-10-CM

## 2025-03-13 DIAGNOSIS — E87.1 HYPONATREMIA: ICD-10-CM

## 2025-03-13 DIAGNOSIS — D46.9 MDS (MYELODYSPLASTIC SYNDROME) (H): ICD-10-CM

## 2025-03-13 DIAGNOSIS — D62 ANEMIA DUE TO BLOOD LOSS, ACUTE: Primary | ICD-10-CM

## 2025-03-13 DIAGNOSIS — E78.5 DYSLIPIDEMIA: ICD-10-CM

## 2025-03-13 DIAGNOSIS — I34.0 SEVERE MITRAL REGURGITATION: ICD-10-CM

## 2025-03-13 DIAGNOSIS — N18.31 CKD STAGE 3A, GFR 45-59 ML/MIN (H): ICD-10-CM

## 2025-03-13 DIAGNOSIS — D61.810 ANTINEOPLASTIC CHEMOTHERAPY INDUCED PANCYTOPENIA: ICD-10-CM

## 2025-03-13 DIAGNOSIS — I50.33 ACUTE ON CHRONIC HEART FAILURE WITH PRESERVED EJECTION FRACTION (HFPEF) (H): ICD-10-CM

## 2025-03-13 RX ORDER — POLYETHYLENE GLYCOL 3350 17 G/17G
17 POWDER, FOR SOLUTION ORAL DAILY PRN
Status: SHIPPED
Start: 2025-03-13

## 2025-03-13 NOTE — PROGRESS NOTES
"[facility] GERIATRICS    Chief Complaint   Patient presents with    RECHECK     HPI:  Kayley Putnam is a 89 year old  (7/9/1933), who is being seen today for an episodic care visit at: [facility] [[id number]].     Past medical history significant for MDS with chronic pancytopenia, chronic atrial fibrillation not on anticoagulation due to pancytopenia, severe mitral regurgitation with suspected torn chordae, chronic hyponatremia, HFpEF.     See H&P written by Brianna Oro dated 3/10/2025 for summary of recent hospitalization    Today patient was seen for follow-up in the TCU.  She tells me she is feeling well.  She does note that she has not had a bowel movement in a few days.  She denies any abdominal pain or nausea.  She denies any shortness of breath, chest pain, dizziness/lightheadedness, dysuria.  She denies any recent black or bloody stool since admission to the TCU.  She denies any dry mouth symptoms or feeling dehydrated. Reports she is staying hydrated. She continues to work with therapy.  Patient is ambulating 75 feet with four-wheel walker standby assist.  Independent with upper body dressing, min assist for lower body dressing. SLUMS 21/30.    Reviewed facility EMR including medications, recent nursing progress notes, vital signs.  Discussed plan of care with nursing.    Allergies, and PMH/PSH reviewed in EPIC today.  REVIEW OF SYSTEMS:  7 point ROS of systems including Constitutional, Respiratory, Cardiovascular, Gastroenterology, Genitourinary, Musculoskeletal, Psychiatric were all negative except for pertinent positives noted in my HPI.    Objective:   /63   Pulse 61   Temp 98  F (36.7  C)   Resp 16   Ht 1.626 m (5' 4\")   Wt 58.1 kg (128 lb)   SpO2 95%   BMI 21.97 kg/m    GENERAL APPEARANCE:  Alert, frail, in NAD  HEENT: normocephalic, moist mucous membranes, nose without drainage or crusting  RESP:  respiratory effort normal, no respiratory distress, Lung " sounds clear, patient is on RA  CV: auscultation of heart done, rate and rhythm regular. + murmur  ABDOMEN: + bowel sounds, soft, nontender, no grimacing or guarding with palpation.  M/S:   no lower extremity edema  NEURO: cranial nerves 2-12 grossly intact and at patient's baseline; moves extremities freely, speech is normal  PSYCH: affect and mood normal      Labs done in SNF are in [facility] [employer]. Please refer to them using [facility]/Care Everywhere. and Recent labs in [facility] reviewed by me today.     Assessment/Plan:  Acute blood loss anemia  Rectal bleeding, resolved  Patient presented to the hospital with rectal bleeding, hemoglobin 6.7 on admission  GI consulted and did not feel patient was candidate for procedures and supportive management was recommended  Patient received 2 units PRBCs inpatient  Baseline hemoglobin 7-8  Hemoglobin 8.1 on 3/12/2025  Patient denies any black or bloody stools  Plan: CBC 3/17.  Monitor for signs and symptoms of bleeding.    Chronic pancytopenia  Myelodysplastic syndrome  History of breast cancer   Patient follows with Dr. Norton at [facility], receives Luspatercept every 3 weeks and letrozole for breast cancer  WBC 1.4, hemoglobin 8.1, platelet count 38,000 on 3/12/2025-stable from previous checks  Plan: CBC 3/17.  Monitor for signs and symptoms of bleeding.  Follow-up with oncology per recommendations.  Hold Luspatercept in the TCU.  Continue letrozole 2.5 mg daily.    Acute hypoxic respiratory failure, resolved  Acute on chronic HFpEF, LVEF 60 to 65%  Severe mitral regurgitation due to possible torn chordae  Moderate tricuspid regurgitation   Moderate aortic stenosis  Patient previously seen in consult with cardiology and patient is not surgical candidate due to age and underlying pancytopenia, mitral valve not favorable for MitraClip given significant mitral calcification and mild stenosis   Patient diuresed inpatient with IV Lasix in setting of transfusions  No leg  swelling, weight 128.8 pounds-down since admission- suspect some inaccuracy with wheelchair vs standing weight and patient denies feeling dry or dehydrated today  Plan: Continue spironolactone 25 mg daily, furosemide 20 mg daily, metoprolol 6.25 mg twice daily. BMP 3/17.  Monitor fluid status with daily weights.    Chronic atrial fibrillation  Not on anticoagulation secondary to pancytopenia  Heart rate 60-70s  Plan: Continue metoprolol 6.25 mg twice daily.  Monitor heart rate.    Dyslipidemia  Plan: Continue atorvastatin 20 mg at bedtime.    Chronic hyponatremia  Baseline sodium 130-134  Sodium 133 on 3/12/2025  Plan: BMP as needed.    Chronic kidney disease stage III A  Baseline creatinine 1-1.2  Creatinine 1.16 on 3/12/2025  Plan: BMP 3/17. Avoid nephrotoxins. Renally dose medications as indicated.    Gout, resolved  Treated with course of prednisone inpatient for acute gout flare  Plan: Monitor for recurrence    Slow transit constipation  Reports last bowel movement a few days ago.  Denies any abdominal pain or cramping.  Per nursing documentation last bowel movement 2 days ago  Plan: Start MiraLAX 17 g daily as needed.  Monitor bowels.    Cognitive impairment  SLUMS 21/30 in the TCU  Plan: OCCUPATIONAL THERAPY to complete cognitive testing for safe discharge planning. Nursing to assist with cares, meals, medication assistance, activities.    Physical deconditioning  Secondary to recent hospitalization, medical conditions as above  Patient continues to work with therapy  Plan: Encourage participation in physical therapy/occupational therapy for strengthening and deconditioning. Discharge planning per their recommendation. Social work to assist with d/c planning.      MED REC REQUIRED  Post Medication Reconciliation Status:  Medication reconciliation previously completed during another office visit    Disclaimer: This note may contain text created using speech-recognition software and may contain unintended word  substitutions.     Electronically signed by: SUPRIYA Head CNP

## 2025-03-13 NOTE — PATIENT INSTRUCTIONS
Vish Putnam  7/9/1933  1) Start miralax 17 gram daily PRN. Diagnosis: constipation  2) BMP,CBC 3/17. Diagnosis: CKD, pancytopenia  Edith Clifton, APRN CNP on 3/13/2025 at 2:55 PM

## 2025-03-13 NOTE — LETTER
" 3/13/2025      Kayley Putnam  [address]  [facility] [address]        [facility] GERIATRICS    Chief Complaint   Patient presents with     RECHECK     HPI:  Kayley Putnam is a 89 year old  (7/9/1933), who is being seen today for an episodic care visit at: [facility]  ([facility]) [[id number]].     Past medical history significant for MDS with chronic pancytopenia, chronic atrial fibrillation not on anticoagulation due to pancytopenia, severe mitral regurgitation with suspected torn chordae, chronic hyponatremia, HFpEF.     See H&P written by Brianna Oro dated 3/10/2025 for summary of recent hospitalization    Today patient was seen for follow-up in the TCU.  She tells me she is feeling well.  She does note that she has not had a bowel movement in a few days.  She denies any abdominal pain or nausea.  She denies any shortness of breath, chest pain, dizziness/lightheadedness, dysuria.  She denies any recent black or bloody stool since admission to the TCU.  She denies any dry mouth symptoms or feeling dehydrated. Reports she is staying hydrated. She continues to work with therapy.  Patient is ambulating 75 feet with four-wheel walker standby assist.  Independent with upper body dressing, min assist for lower body dressing. SLUMS 21/30.    Reviewed facility EMR including medications, recent nursing progress notes, vital signs.  Discussed plan of care with nursing.    Allergies, and PMH/PSH reviewed in EPIC today.  REVIEW OF SYSTEMS:  7 point ROS of systems including Constitutional, Respiratory, Cardiovascular, Gastroenterology, Genitourinary, Musculoskeletal, Psychiatric were all negative except for pertinent positives noted in my HPI.    Objective:   /63   Pulse 61   Temp 98  F (36.7  C)   Resp 16   Ht 1.626 m (5' 4\")   Wt 58.1 kg (128 lb)   SpO2 95%   BMI 21.97 kg/m    GENERAL APPEARANCE:  Alert, frail, in NAD  HEENT: normocephalic, moist mucous membranes, nose without " drainage or crusting  RESP:  respiratory effort normal, no respiratory distress, Lung sounds clear, patient is on RA  CV: auscultation of heart done, rate and rhythm regular. + murmur  ABDOMEN: + bowel sounds, soft, nontender, no grimacing or guarding with palpation.  M/S:   no lower extremity edema  NEURO: cranial nerves 2-12 grossly intact and at patient's baseline; moves extremities freely, speech is normal  PSYCH: affect and mood normal      Labs done in SNF are in [facility]. Please refer to them using [facility]/Care Everywhere. and Recent labs in [facility] reviewed by me today.     Assessment/Plan:  Acute blood loss anemia  Rectal bleeding, resolved  Patient presented to the hospital with rectal bleeding, hemoglobin 6.7 on admission  GI consulted and did not feel patient was candidate for procedures and supportive management was recommended  Patient received 2 units PRBCs inpatient  Baseline hemoglobin 7-8  Hemoglobin 8.1 on 3/12/2025  Patient denies any black or bloody stools  Plan: CBC 3/17.  Monitor for signs and symptoms of bleeding.    Chronic pancytopenia  Myelodysplastic syndrome  History of breast cancer   Patient follows with Dr. Norton at [place] [facility], receives Luspatercept every 3 weeks and letrozole for breast cancer  WBC 1.4, hemoglobin 8.1, platelet count 38,000 on 3/12/2025-stable from previous checks  Plan: CBC 3/17.  Monitor for signs and symptoms of bleeding.  Follow-up with oncology per recommendations.  Hold Luspatercept in the TCU.  Continue letrozole 2.5 mg daily.    Acute hypoxic respiratory failure, resolved  Acute on chronic HFpEF, LVEF 60 to 65%  Severe mitral regurgitation due to possible torn chordae  Moderate tricuspid regurgitation   Moderate aortic stenosis  Patient previously seen in consult with cardiology and patient is not surgical candidate due to age and underlying pancytopenia, mitral valve not favorable for MitraClip given significant mitral calcification and mild stenosis    Patient diuresed inpatient with IV Lasix in setting of transfusions  No leg swelling, weight 128.8 pounds-down since admission- suspect some inaccuracy with wheelchair vs standing weight and patient denies feeling dry or dehydrated today  Plan: Continue spironolactone 25 mg daily, furosemide 20 mg daily, metoprolol 6.25 mg twice daily. BMP 3/17.  Monitor fluid status with daily weights.    Chronic atrial fibrillation  Not on anticoagulation secondary to pancytopenia  Heart rate 60-70s  Plan: Continue metoprolol 6.25 mg twice daily.  Monitor heart rate.    Dyslipidemia  Plan: Continue atorvastatin 20 mg at bedtime.    Chronic hyponatremia  Baseline sodium 130-134  Sodium 133 on 3/12/2025  Plan: BMP as needed.    Chronic kidney disease stage III A  Baseline creatinine 1-1.2  Creatinine 1.16 on 3/12/2025  Plan: BMP 3/17. Avoid nephrotoxins. Renally dose medications as indicated.    Gout, resolved  Treated with course of prednisone inpatient for acute gout flare  Plan: Monitor for recurrence    Slow transit constipation  Reports last bowel movement a few days ago.  Denies any abdominal pain or cramping.  Per nursing documentation last bowel movement 2 days ago  Plan: Start MiraLAX 17 g daily as needed.  Monitor bowels.    Cognitive impairment  SLUMS 21/30 in the TCU  Plan: OCCUPATIONAL THERAPY to complete cognitive testing for safe discharge planning. Nursing to assist with cares, meals, medication assistance, activities.    Physical deconditioning  Secondary to recent hospitalization, medical conditions as above  Patient continues to work with therapy  Plan: Encourage participation in physical therapy/occupational therapy for strengthening and deconditioning. Discharge planning per their recommendation. Social work to assist with d/c planning.      MED REC REQUIRED  Post Medication Reconciliation Status:  Medication reconciliation previously completed during another office visit    Disclaimer: This note may contain  text created using speech-recognition software and may contain unintended word substitutions.     Electronically signed by: SUPRIYA Head CNP         Sincerely,        SUPRIYA Head CNP    Electronically signed

## 2025-03-16 ENCOUNTER — LAB REQUISITION (OUTPATIENT)
Dept: LAB | Facility: CLINIC | Age: OVER 89
End: 2025-03-16
Payer: MEDICARE

## 2025-03-16 DIAGNOSIS — N18.4 CHRONIC KIDNEY DISEASE, STAGE 4 (SEVERE) (H): ICD-10-CM

## 2025-03-16 DIAGNOSIS — D61.818 OTHER PANCYTOPENIA (H): ICD-10-CM

## 2025-03-17 ENCOUNTER — TELEPHONE (OUTPATIENT)
Dept: CARDIOLOGY | Facility: CLINIC | Age: OVER 89
End: 2025-03-17

## 2025-03-17 VITALS
RESPIRATION RATE: 16 BRPM | DIASTOLIC BLOOD PRESSURE: 76 MMHG | TEMPERATURE: 97.9 F | HEART RATE: 51 BPM | OXYGEN SATURATION: 94 % | HEIGHT: 64 IN | BODY MASS INDEX: 21.85 KG/M2 | SYSTOLIC BLOOD PRESSURE: 117 MMHG | WEIGHT: 128 LBS

## 2025-03-17 DIAGNOSIS — I48.19 PERSISTENT ATRIAL FIBRILLATION (H): ICD-10-CM

## 2025-03-17 LAB
ANION GAP SERPL CALCULATED.3IONS-SCNC: 11 MMOL/L (ref 7–15)
BUN SERPL-MCNC: 42.6 MG/DL (ref 8–23)
CALCIUM SERPL-MCNC: 9 MG/DL (ref 8.8–10.4)
CHLORIDE SERPL-SCNC: 100 MMOL/L (ref 98–107)
CREAT SERPL-MCNC: 1.27 MG/DL (ref 0.51–0.95)
EGFRCR SERPLBLD CKD-EPI 2021: 40 ML/MIN/1.73M2
ELLIPTOCYTES BLD QL SMEAR: SLIGHT
ERYTHROCYTE [DISTWIDTH] IN BLOOD BY AUTOMATED COUNT: 24.8 % (ref 10–15)
FRAGMENTS BLD QL SMEAR: ABNORMAL
GLUCOSE SERPL-MCNC: 92 MG/DL (ref 70–99)
HCO3 SERPL-SCNC: 23 MMOL/L (ref 22–29)
HCT VFR BLD AUTO: 24.6 % (ref 35–47)
HGB BLD-MCNC: 8.2 G/DL (ref 11.7–15.7)
MCH RBC QN AUTO: 35.3 PG (ref 26.5–33)
MCHC RBC AUTO-ENTMCNC: 33.3 G/DL (ref 31.5–36.5)
MCV RBC AUTO: 106 FL (ref 78–100)
PLAT MORPH BLD: ABNORMAL
PLATELET # BLD AUTO: 29 10E3/UL (ref 150–450)
POTASSIUM SERPL-SCNC: 4.5 MMOL/L (ref 3.4–5.3)
RBC # BLD AUTO: 2.32 10E6/UL (ref 3.8–5.2)
RBC MORPH BLD: ABNORMAL
SODIUM SERPL-SCNC: 134 MMOL/L (ref 135–145)
WBC # BLD AUTO: 1.4 10E3/UL (ref 4–11)

## 2025-03-17 PROCEDURE — 80048 BASIC METABOLIC PNL TOTAL CA: CPT | Mod: ORL | Performed by: NURSE PRACTITIONER

## 2025-03-17 PROCEDURE — 85027 COMPLETE CBC AUTOMATED: CPT | Mod: ORL | Performed by: NURSE PRACTITIONER

## 2025-03-17 PROCEDURE — P9604 ONE-WAY ALLOW PRORATED TRIP: HCPCS | Mod: ORL | Performed by: NURSE PRACTITIONER

## 2025-03-17 PROCEDURE — 36415 COLL VENOUS BLD VENIPUNCTURE: CPT | Mod: ORL | Performed by: NURSE PRACTITIONER

## 2025-03-17 RX ORDER — METOPROLOL TARTRATE 25 MG/1
6.25 TABLET, FILM COATED ORAL 2 TIMES DAILY
Qty: 60 TABLET | Refills: 0 | Status: SHIPPED | OUTPATIENT
Start: 2025-03-17

## 2025-03-17 NOTE — PATIENT INSTRUCTIONS
[place] [facility] Discharge Orders    Name: Kayley Putnam  : 1933  Planned Discharge Date: 3/18/2025  Discharged to: previous NILES    MEDICAL FOLLOW UP  Follow up with primary care provider in 1 week  Follow up with specialists cardiology and oncology per recommendations  Current [place] scheduled appointments:  Appointments in Next Year      Mar 18, 2025 1:20 PM  (Arrive by 1:15 PM)  Return Cardiology with Mary Ann Smallwood PA-C  [facility] [place] ([facility] ) [phone]         FUTURE LABS: BMP 3/24 through home care with results to PCP and CBC at follow up with oncology    DISCHARGE MEDICATIONS:  The patient s pharmacy is authorized to dispense a 30-day supply of medications. Refill requests should be directed to the primary provider, Taj Gordillo  Current Outpatient Medications   Medication Sig Dispense Refill    acetaminophen (TYLENOL) 500 MG tablet Take 500 mg by mouth at bedtime.      atorvastatin (LIPITOR) 20 MG tablet Take 20 mg by mouth At Bedtime       furosemide (LASIX) 20 MG tablet Take 20 mg by mouth daily.      letrozole (FEMARA) 2.5 MG tablet Take 1 tablet by mouth daily.      luspatercept-aamt (REBLOZYL) 25 MG injection Inject 1 mg/kg subcutaneously every 21 days.      metoprolol tartrate (LOPRESSOR) 25 MG tablet Take 0.25 tablets (6.25 mg) by mouth 2 times daily. 60 tablet 0    polyethylene glycol (MIRALAX) 17 GM/Dose powder Take 17 g by mouth daily as needed for constipation.      spironolactone (ALDACTONE) 25 MG tablet Take 25 mg by mouth daily.         SERVICES:  Home Care:  Occupational Therapy, Physical Therapy, Registered Nurse, and Home Health Aide    ADDITIONAL INSTRUCTIONS:  Weigh yourself daily in the morning and keep a record. Call your primary clinic: a) if you are more short of breath, or b) if your weight changes more than 3 pounds in one day or more than 5 pounds in one week.   Fiber restricted diet with  thin liquids    Edith Clifton, APRN CNP  This document was electronically signed on March 17, 2025       Dermal Autograft Text: The defect edges were debeveled with a #15 scalpel blade.  Given the location of the defect, shape of the defect and the proximity to free margins a dermal autograft was deemed most appropriate.  Using a sterile surgical marker, the primary defect shape was transferred to the donor site. The area thus outlined was incised deep to adipose tissue with a #15 scalpel blade.  The harvested graft was then trimmed of adipose and epidermal tissue until only dermis was left.  The skin graft was then placed in the primary defect and oriented appropriately.

## 2025-03-17 NOTE — PROGRESS NOTES
[facility] GERIATRICS DISCHARGE SUMMARY  PATIENT'S NAME: Kayley Putnam  YOB: 1933  MEDICAL RECORD NUMBER:  [id number]  Place of Service where encounter took place:  [facility]  (U) [[id number]]    PRIMARY CARE PROVIDER AND CLINIC RESPONSIBLE AFTER TRANSFER:   Taj Gordillo MD, [id number] [address] [facility] [id number]      Transferring providers: SUPRIYA Head CNP, Brianna Oro MD  Recent Hospitalization/ED:  [facility] stay 2/28/25 to 3/7/25.  Date of SNF Admission: March 07, 2025  Date of SNF (anticipated) Discharge: March 20, 2025-patient driven discharge  Discharged to: previous longterm  Cognitive Scores: SLUMS 21/30  Physical Function: ambulating 75 feet with 4WW SBA, transfers and bed mobility standby assist, min assist for lower body dressing, independent for upper body dressing    CODE STATUS/ADVANCE DIRECTIVES DISCUSSION:  No CPR-DNI  ALLERGIES: Amlodipine, Codeine, Flecainide, Hydrochlorothiazide, Meperidine, Promethazine, Ceftin [cefuroxime], Tetracycline, and Vancomycin    NURSING FACILITY COURSE     Past medical history significant for MDS with chronic pancytopenia, chronic atrial fibrillation not on anticoagulation due to pancytopenia, severe mitral regurgitation with suspected torn chordae, chronic hyponatremia, HFpEF.      See H&P written by Brianna Oro dated 3/10/2025 for summary of recent hospitalization    Summary of nursing facility stay:   Today patient was seen for follow-up in the TCU. She denies any change in respiratory status- reports chronic SOB, no worse than baseline recently. She denies chest pain, dizziness/lightheadedness, dysuria.  Her bowels are moving okay.  She denies any black or bloody stools.  Patient to be discharging back to assisted living.  She will continue therapy through home care.    Specific problems addressed in the TCU:  Acute blood loss anemia  Rectal bleeding, resolved  Patient  presented to the hospital with rectal bleeding, hemoglobin 6.7 on admission  GI consulted and did not feel patient was candidate for procedures and supportive management was recommended  Patient received 2 units PRBCs inpatient  Baseline hemoglobin 7-8  Hemoglobin 8.2 on 3/17/2025  Denies black or bloody stools  Plan: CBC PRN.  Monitor for signs and symptoms of bleeding.     Chronic pancytopenia  Myelodysplastic syndrome  History of breast cancer   Patient follows with Dr. Norton at [place] [facility], receives Luspatercept every 3 weeks and letrozole for breast cancer  WBC 1.4, hemoglobin 8.2, platelet count 29,000 on 3/17/2025-stable from previous checks  Plan: CBC PRN.  Monitor for signs and symptoms of bleeding.  Follow-up with oncology per recommendations.  Hold Luspatercept in the TCU.  Continue letrozole 2.5 mg daily.     Acute hypoxic respiratory failure, resolved  Acute on chronic HFpEF, LVEF 60 to 65%  Severe mitral regurgitation due to possible torn chordae  Moderate tricuspid regurgitation   Moderate aortic stenosis  Patient previously seen in consult with cardiology and patient is not surgical candidate due to age and underlying pancytopenia, mitral valve not favorable for MitraClip given significant mitral calcification and mild stenosis   Patient diuresed inpatient with IV Lasix in setting of transfusions  No leg swelling, weight 126.4 lb on 3/17 trending down  Plan: Continue spironolactone 25 mg daily, furosemide 20 mg daily, metoprolol 6.25 mg twice daily.  Monitor fluid status with daily weights.     Chronic atrial fibrillation  Not on anticoagulation secondary to pancytopenia  Heart rate 60-70s, occasional HR in the 50s  Plan: Continue metoprolol 6.25 mg twice daily.  Monitor heart rate.     Dyslipidemia  Plan: Continue atorvastatin 20 mg at bedtime.     Chronic hyponatremia  Baseline sodium 130-134  Sodium 134 on 3/17/2025  Plan: BMP as needed.     Chronic kidney disease stage III A  Baseline  creatinine 1-1.2  Creatinine 1.27 on 3/17/2025- just above baseline  Plan: BMP 3/24 through home care with results to PCP. Avoid nephrotoxins. Renally dose medications as indicated.     Gout, resolved  Treated with course of prednisone inpatient for acute gout flare  Plan: Monitor for recurrence     Slow transit constipation  Bowels moving okay per patient report  Plan: Continue MiraLAX 17 g daily as needed.  Monitor bowels.     Cognitive impairment  SLUMS 21/30 in the TCU  Plan: Nursing/NILES to assist with cares, meals, medication assistance, activities.     Physical deconditioning  Secondary to recent hospitalization, medical conditions as above  Completed course of therapy in the TCU, patient driven discharge  Plan: Continue therapy through home care    Discharge Medications:  MED REC REQUIRED  Post Medication Reconciliation Status:  Discharge medications reconciled and changed, see notes/orders     Current Outpatient Medications   Medication Sig Dispense Refill    acetaminophen (TYLENOL) 500 MG tablet Take 500 mg by mouth at bedtime.      atorvastatin (LIPITOR) 20 MG tablet Take 20 mg by mouth At Bedtime       furosemide (LASIX) 20 MG tablet Take 20 mg by mouth daily.      letrozole (FEMARA) 2.5 MG tablet Take 1 tablet by mouth daily.      luspatercept-aamt (REBLOZYL) 25 MG injection Inject 1 mg/kg subcutaneously every 21 days.      metoprolol tartrate (LOPRESSOR) 25 MG tablet Take 0.25 tablets (6.25 mg) by mouth 2 times daily. 60 tablet 0    polyethylene glycol (MIRALAX) 17 GM/Dose powder Take 17 g by mouth daily as needed for constipation.      spironolactone (ALDACTONE) 25 MG tablet Take 25 mg by mouth daily.           Past Medical History:   Past Medical History:   Diagnosis Date    Anemia     Atherosclerosis of aorta     Atrial fibrillation     Breast cancer     CAD (coronary artery disease)     CHF (congestive heart failure)     Chronic kidney disease     Hyperlipidemia     Hypertension     Mitral  "regurgitation     Myelodysplastic syndrome     Osteoarthritis      Physical Exam:   Vitals: /76   Pulse 51   Temp 97.9  F (36.6  C)   Resp 16   Ht 1.626 m (5' 4\")   Wt 58.1 kg (128 lb)   SpO2 94%   BMI 21.97 kg/m    BMI: Body mass index is 21.97 kg/m .  GENERAL APPEARANCE:  Alert, frail, in NAD  HEENT: normocephalic, moist mucous membranes, nose without drainage or crusting  RESP:  respiratory effort normal, no respiratory distress, Lung sounds clear, patient is on RA  CV: auscultation of heart done, rate and rhythm regular.   ABDOMEN: + bowel sounds, soft, nontender, no grimacing or guarding with palpation.  M/S:  no lower extremity edema  PSYCH:  affect and mood normal      SNF labs: Most Recent 3 CBC's:  Recent Labs   Lab Test 03/17/25  0654 03/12/25  0448 03/07/25  0644   WBC 1.4* 1.4* 2.3*   HGB 8.2* 8.1* 8.2*   * 107* 104*   PLT 29* 38* 32*     Most Recent 3 BMP's:  Recent Labs   Lab Test 03/17/25  0654 03/12/25  0448 03/07/25  0644   * 133* 134*   POTASSIUM 4.5 4.5 4.2   CHLORIDE 100 101 104   CO2 23 22 18*   BUN 42.6* 49.8* 49.4*   CR 1.27* 1.16* 1.01*   ANIONGAP 11 10 12   NATALIA 9.0 9.0 9.1   GLC 92 92 91       DISCHARGE PLAN:  Follow up labs:  [facility] 3/24 through home care with results to PCP and CBC at follow up with oncology  Medical Follow Up:     Follow up with primary care provider in 1 week  Follow up with specialists cardiology and oncology per recommendations  [facility] scheduled appointments:  Appointments in Next Year      Mar 18, 2025 1:20 PM  (Arrive by 1:15 PM)  Return Cardiology with Mary Ann Smallwood PA-C  [facility] [place] ([facility] ) [phone]     Discharge Services: Home Care:  Occupational Therapy, Physical Therapy, Registered Nurse, and Home Health Aide through [facility]  Discharge Instructions   Weigh yourself daily in the morning and keep a record. Call your primary clinic: a) if you are more " short of breath, or b) if your weight changes more than 3 pounds in one day or more than 5 pounds in one week.   Fiber restricted diet with thin liquids    TOTAL DISCHARGE TIME:   Greater than 30 minutes  Electronically signed by:  SUPRIYA Head CNP     Documentation of Face to Face and Certification for Home Health Services    I certify that patient: Kayley Putnam is under my care and that I, or a nurse practitioner or physician's assistant working with me, had a face-to-face encounter that meets the physician face-to-face encounter requirements with this patient on: 3/18/2025.    This encounter with the patient was in whole, or in part, for the following medical condition, which is the primary reason for home health care:   1. Anemia due to blood loss, acute    2. Antineoplastic chemotherapy induced pancytopenia    3. MDS (myelodysplastic syndrome) (H)    4. History of breast cancer    5. Acute on chronic heart failure with preserved ejection fraction (HFpEF) (H)    6. Severe mitral regurgitation    7. Chronic atrial fibrillation (H)    8. Dyslipidemia    9. Hyponatremia    10. CKD stage 3a, GFR 45-59 ml/min (H)    11. Cognitive impairment    12. Physical deconditioning      .    I certify that, based on my findings, the following services are medically necessary home health services: Nursing, Occupational Therapy, Physical Therapy, and HHA .    My clinical findings support the need for the above services because: Nurse is needed: to provide medication management and to check BMP 3/24 with results to PCP, Occupational Therapy Services are needed to assess and treat cognitive ability and address ADL safety due to impairment in completing ADLs safely and independently as he returns home from TCU stay. and Physical Therapy Services are needed to assess and treat the following functional impairments: generalized weakness and deconditioning.      Further, I certify that my clinical findings support that this  patient is homebound (i.e. absences from home require considerable and taxing effort and are for medical reasons or [employer] services or infrequently or of short duration when for other reasons) because: Requires assistance of another person or specialized equipment to access medical services because patient: Requires supervision of another for safe transfer...    Based on the above findings. I certify that this patient is confined to the home and needs intermittent skilled nursing care, physical therapy and/or speech therapy.  The patient is under my care, and I have initiated the establishment of the plan of care.  This patient will be followed by a physician who will periodically review the plan of care.  Physician/Provider to provide follow up care: Taj Gordillo, SUPRIYA CNP on 3/18/2025 at 11:07 AM

## 2025-03-17 NOTE — TELEPHONE ENCOUNTER
Health Call Center    Phone Message    May a detailed message be left on voicemail: yes     Reason for Call: Medication Refill Request    Has the patient contacted the pharmacy for the refill? Yes   Name of medication being requested: metoprolol tartrate (LOPRESSOR) 25 MG tablet    Provider who prescribed the medication: Fritz Allen MD    Pharmacy: [id number] - [place], [place] - [address] (re-confirmed pharmacy with daughter as preferred pharmacy in chart lists [facility])    Date medication is needed: ASAP - Pt is completely out. Pt had a follow up scheduled for 3/18 that had to be cancelled due to pt still being in TCU, daughter, Winston is hoping to have her discharged at end of week but wants to wait and confirm before re-scheduling. Will need enough to at least get through a new post ED/Hospital follow up in near future.       Action Taken: Message routed to:  Other:   CARDIOLOGY ADULT [place]  [[id number]]    Travel Screening: Not Applicable     Date of Service:

## 2025-03-18 ENCOUNTER — DISCHARGE SUMMARY NURSING HOME (OUTPATIENT)
Dept: GERIATRICS | Facility: CLINIC | Age: OVER 89
End: 2025-03-18
Payer: MEDICARE

## 2025-03-18 DIAGNOSIS — N18.31 CKD STAGE 3A, GFR 45-59 ML/MIN (H): ICD-10-CM

## 2025-03-18 DIAGNOSIS — I48.20 CHRONIC ATRIAL FIBRILLATION (H): ICD-10-CM

## 2025-03-18 DIAGNOSIS — I50.33 ACUTE ON CHRONIC HEART FAILURE WITH PRESERVED EJECTION FRACTION (HFPEF) (H): ICD-10-CM

## 2025-03-18 DIAGNOSIS — D61.810 ANTINEOPLASTIC CHEMOTHERAPY INDUCED PANCYTOPENIA: ICD-10-CM

## 2025-03-18 DIAGNOSIS — E78.5 DYSLIPIDEMIA: ICD-10-CM

## 2025-03-18 DIAGNOSIS — R41.89 COGNITIVE IMPAIRMENT: ICD-10-CM

## 2025-03-18 DIAGNOSIS — Z85.3 HISTORY OF BREAST CANCER: ICD-10-CM

## 2025-03-18 DIAGNOSIS — R53.81 PHYSICAL DECONDITIONING: ICD-10-CM

## 2025-03-18 DIAGNOSIS — D46.9 MDS (MYELODYSPLASTIC SYNDROME) (H): ICD-10-CM

## 2025-03-18 DIAGNOSIS — D62 ANEMIA DUE TO BLOOD LOSS, ACUTE: Primary | ICD-10-CM

## 2025-03-18 DIAGNOSIS — E87.1 HYPONATREMIA: ICD-10-CM

## 2025-03-18 DIAGNOSIS — T45.1X5A ANTINEOPLASTIC CHEMOTHERAPY INDUCED PANCYTOPENIA: ICD-10-CM

## 2025-03-18 DIAGNOSIS — I34.0 SEVERE MITRAL REGURGITATION: ICD-10-CM

## 2025-03-18 PROCEDURE — 99316 NF DSCHRG MGMT 30 MIN+: CPT | Performed by: NURSE PRACTITIONER

## 2025-03-18 NOTE — LETTER
3/18/2025      Kayley Putnam  [address]  [place] [address]        [facility] GERIATRICS DISCHARGE SUMMARY  PATIENT'S NAME: Kayley Putnam  YOB: 1933  MEDICAL RECORD NUMBER:  [id number]  Place of Service where encounter took place:  [facility]  (U) [[id number]]    PRIMARY CARE PROVIDER AND CLINIC RESPONSIBLE AFTER TRANSFER:   Taj Gordillo MD, [address] [facility] / [facility] [address]      Transferring providers: SUPRIYA Head CNP, Brianna Oro MD  Recent Hospitalization/ED:  [facility] Hospital stay 2/28/25 to 3/7/25.  Date of SNF Admission: March 07, 2025  Date of SNF (anticipated) Discharge: March 20, 2025-patient driven discharge  Discharged to: previous [facility]  Cognitive Scores: SLUMS 21/30  Physical Function: ambulating 75 feet with 4WW SBA, transfers and bed mobility standby assist, min assist for lower body dressing, independent for upper body dressing    CODE STATUS/ADVANCE DIRECTIVES DISCUSSION:  No CPR-DNI  ALLERGIES: Amlodipine, Codeine, Flecainide, Hydrochlorothiazide, Meperidine, Promethazine, Ceftin [cefuroxime], Tetracycline, and Vancomycin    NURSING FACILITY COURSE     Past medical history significant for MDS with chronic pancytopenia, chronic atrial fibrillation not on anticoagulation due to pancytopenia, severe mitral regurgitation with suspected torn chordae, chronic hyponatremia, HFpEF.      See H&P written by Brianna Oro dated 3/10/2025 for summary of recent hospitalization    Summary of nursing facility stay:   Today patient was seen for follow-up in the TCU. She denies any change in respiratory status- reports chronic SOB, no worse than baseline recently. She denies chest pain, dizziness/lightheadedness, dysuria.  Her bowels are moving okay.  She denies any black or bloody stools.  Patient to be discharging back to assisted living.  She will continue therapy through home care.    Specific problems  addressed in the TCU:  Acute blood loss anemia  Rectal bleeding, resolved  Patient presented to the hospital with rectal bleeding, hemoglobin 6.7 on admission  GI consulted and did not feel patient was candidate for procedures and supportive management was recommended  Patient received 2 units PRBCs inpatient  Baseline hemoglobin 7-8  Hemoglobin 8.2 on 3/17/2025  Denies black or bloody stools  Plan: CBC PRN.  Monitor for signs and symptoms of bleeding.     Chronic pancytopenia  Myelodysplastic syndrome  History of breast cancer   Patient follows with Dr. Norton at [facility], receives Luspatercept every 3 weeks and letrozole for breast cancer  WBC 1.4, hemoglobin 8.2, platelet count 29,000 on 3/17/2025-stable from previous checks  Plan: CBC PRN.  Monitor for signs and symptoms of bleeding.  Follow-up with oncology per recommendations.  Hold Luspatercept in the TCU.  Continue letrozole 2.5 mg daily.     Acute hypoxic respiratory failure, resolved  Acute on chronic HFpEF, LVEF 60 to 65%  Severe mitral regurgitation due to possible torn chordae  Moderate tricuspid regurgitation   Moderate aortic stenosis  Patient previously seen in consult with cardiology and patient is not surgical candidate due to age and underlying pancytopenia, mitral valve not favorable for MitraClip given significant mitral calcification and mild stenosis   Patient diuresed inpatient with IV Lasix in setting of transfusions  No leg swelling, weight 126.4 lb on 3/17 trending down  Plan: Continue spironolactone 25 mg daily, furosemide 20 mg daily, metoprolol 6.25 mg twice daily.  Monitor fluid status with daily weights.     Chronic atrial fibrillation  Not on anticoagulation secondary to pancytopenia  Heart rate 60-70s, occasional HR in the 50s  Plan: Continue metoprolol 6.25 mg twice daily.  Monitor heart rate.     Dyslipidemia  Plan: Continue atorvastatin 20 mg at bedtime.     Chronic hyponatremia  Baseline sodium 130-134  Sodium 134 on  3/17/2025  Plan: BMP as needed.     Chronic kidney disease stage III A  Baseline creatinine 1-1.2  Creatinine 1.27 on 3/17/2025- just above baseline  Plan: BMP 3/24 through home care with results to PCP. Avoid nephrotoxins. Renally dose medications as indicated.     Gout, resolved  Treated with course of prednisone inpatient for acute gout flare  Plan: Monitor for recurrence     Slow transit constipation  Bowels moving okay per patient report  Plan: Continue MiraLAX 17 g daily as needed.  Monitor bowels.     Cognitive impairment  SLUMS 21/30 in the TCU  Plan: Nursing/NILES to assist with cares, meals, medication assistance, activities.     Physical deconditioning  Secondary to recent hospitalization, medical conditions as above  Completed course of therapy in the TCU, patient driven discharge  Plan: Continue therapy through home care    Discharge Medications:  MED REC REQUIRED  Post Medication Reconciliation Status:  Discharge medications reconciled and changed, see notes/orders     Current Outpatient Medications   Medication Sig Dispense Refill     acetaminophen (TYLENOL) 500 MG tablet Take 500 mg by mouth at bedtime.       atorvastatin (LIPITOR) 20 MG tablet Take 20 mg by mouth At Bedtime        furosemide (LASIX) 20 MG tablet Take 20 mg by mouth daily.       letrozole (FEMARA) 2.5 MG tablet Take 1 tablet by mouth daily.       luspatercept-aamt (REBLOZYL) 25 MG injection Inject 1 mg/kg subcutaneously every 21 days.       metoprolol tartrate (LOPRESSOR) 25 MG tablet Take 0.25 tablets (6.25 mg) by mouth 2 times daily. 60 tablet 0     polyethylene glycol (MIRALAX) 17 GM/Dose powder Take 17 g by mouth daily as needed for constipation.       spironolactone (ALDACTONE) 25 MG tablet Take 25 mg by mouth daily.           Past Medical History:   Past Medical History:   Diagnosis Date     Anemia      Atherosclerosis of aorta      Atrial fibrillation      Breast cancer      CAD (coronary artery disease)      CHF (congestive  "heart failure)      Chronic kidney disease      Hyperlipidemia      Hypertension      Mitral regurgitation      Myelodysplastic syndrome      Osteoarthritis      Physical Exam:   Vitals: /76   Pulse 51   Temp 97.9  F (36.6  C)   Resp 16   Ht 1.626 m (5' 4\")   Wt 58.1 kg (128 lb)   SpO2 94%   BMI 21.97 kg/m    BMI: Body mass index is 21.97 kg/m .  GENERAL APPEARANCE:  Alert, frail, in NAD  HEENT: normocephalic, moist mucous membranes, nose without drainage or crusting  RESP:  respiratory effort normal, no respiratory distress, Lung sounds clear, patient is on RA  CV: auscultation of heart done, rate and rhythm regular.   ABDOMEN: + bowel sounds, soft, nontender, no grimacing or guarding with palpation.  M/S:  no lower extremity edema  PSYCH:  affect and mood normal      SNF labs: Most Recent 3 CBC's:  Recent Labs   Lab Test 03/17/25  0654 03/12/25  0448 03/07/25  0644   WBC 1.4* 1.4* 2.3*   HGB 8.2* 8.1* 8.2*   * 107* 104*   PLT 29* 38* 32*     Most Recent 3 BMP's:  Recent Labs   Lab Test 03/17/25  0654 03/12/25  0448 03/07/25  0644   * 133* 134*   POTASSIUM 4.5 4.5 4.2   CHLORIDE 100 101 104   CO2 23 22 18*   BUN 42.6* 49.8* 49.4*   CR 1.27* 1.16* 1.01*   ANIONGAP 11 10 12   NATALIA 9.0 9.0 9.1   GLC 92 92 91       DISCHARGE PLAN:  Follow up labs:  [facility] 3/24 through home care with results to PCP and CBC at follow up with oncology  Medical Follow Up:     Follow up with primary care provider in 1 week  Follow up with specialists cardiology and oncology per recommendations  [facility] scheduled appointments:  Appointments in Next Year      Mar 18, 2025 1:20 PM  (Arrive by 1:15 PM)  Return Cardiology with Mary Ann Smallwood PA-C  [facility] ([facility] ) [phone]     Discharge Services: Home Care:  Occupational Therapy, Physical Therapy, Registered Nurse, and Home Health Aide through [facility]  Discharge Instructions   Weigh " yourself daily in the morning and keep a record. Call your primary clinic: a) if you are more short of breath, or b) if your weight changes more than 3 pounds in one day or more than 5 pounds in one week.   Fiber restricted diet with thin liquids    TOTAL DISCHARGE TIME:   Greater than 30 minutes  Electronically signed by:  SUPRIYA Head CNP     Documentation of Face to Face and Certification for Home Health Services    I certify that patient: Kayley Putnam is under my care and that I, or a nurse practitioner or physician's assistant working with me, had a face-to-face encounter that meets the physician face-to-face encounter requirements with this patient on: 3/18/2025.    This encounter with the patient was in whole, or in part, for the following medical condition, which is the primary reason for home health care:   1. Anemia due to blood loss, acute    2. Antineoplastic chemotherapy induced pancytopenia    3. MDS (myelodysplastic syndrome) (H)    4. History of breast cancer    5. Acute on chronic heart failure with preserved ejection fraction (HFpEF) (H)    6. Severe mitral regurgitation    7. Chronic atrial fibrillation (H)    8. Dyslipidemia    9. Hyponatremia    10. CKD stage 3a, GFR 45-59 ml/min (H)    11. Cognitive impairment    12. Physical deconditioning      .    I certify that, based on my findings, the following services are medically necessary home health services: Nursing, Occupational Therapy, Physical Therapy, and HHA .    My clinical findings support the need for the above services because: Nurse is needed: to provide medication management and to check BMP 3/24 with results to PCP, Occupational Therapy Services are needed to assess and treat cognitive ability and address ADL safety due to impairment in completing ADLs safely and independently as he returns home from TCU stay. and Physical Therapy Services are needed to assess and treat the following functional impairments: generalized  weakness and deconditioning.      Further, I certify that my clinical findings support that this patient is homebound (i.e. absences from home require considerable and taxing effort and are for medical reasons or Christian services or infrequently or of short duration when for other reasons) because: Requires assistance of another person or specialized equipment to access medical services because patient: Requires supervision of another for safe transfer...    Based on the above findings. I certify that this patient is confined to the home and needs intermittent skilled nursing care, physical therapy and/or speech therapy.  The patient is under my care, and I have initiated the establishment of the plan of care.  This patient will be followed by a physician who will periodically review the plan of care.  Physician/Provider to provide follow up care: Taj Gordillo APRN CNP on 3/18/2025 at 11:07 AM                Sincerely,        SUPRIYA Head CNP    Electronically signed

## 2025-03-25 ENCOUNTER — LAB REQUISITION (OUTPATIENT)
Dept: LAB | Facility: CLINIC | Age: OVER 89
End: 2025-03-25
Payer: MEDICARE

## 2025-03-25 DIAGNOSIS — D46.9 MYELODYSPLASTIC SYNDROME, UNSPECIFIED (H): ICD-10-CM

## 2025-03-27 ENCOUNTER — LAB REQUISITION (OUTPATIENT)
Dept: LAB | Facility: CLINIC | Age: OVER 89
End: 2025-03-27
Payer: MEDICARE

## 2025-03-27 DIAGNOSIS — D46.1 REFRACTORY ANEMIA WITH RING SIDEROBLASTS (H): ICD-10-CM

## 2025-03-28 LAB
ANION GAP SERPL CALCULATED.3IONS-SCNC: 16 MMOL/L (ref 7–15)
BASOPHILS # BLD AUTO: 0 10E3/UL (ref 0–0.2)
BASOPHILS NFR BLD AUTO: 1 %
BUN SERPL-MCNC: 43.8 MG/DL (ref 8–23)
BURR CELLS BLD QL SMEAR: SLIGHT
CALCIUM SERPL-MCNC: 9.9 MG/DL (ref 8.8–10.4)
CHLORIDE SERPL-SCNC: 99 MMOL/L (ref 98–107)
CREAT SERPL-MCNC: 1.31 MG/DL (ref 0.51–0.95)
EGFRCR SERPLBLD CKD-EPI 2021: 38 ML/MIN/1.73M2
EOSINOPHIL # BLD AUTO: 0 10E3/UL (ref 0–0.7)
EOSINOPHIL NFR BLD AUTO: 1 %
ERYTHROCYTE [DISTWIDTH] IN BLOOD BY AUTOMATED COUNT: 25 % (ref 10–15)
GLUCOSE SERPL-MCNC: 117 MG/DL (ref 70–99)
HCO3 SERPL-SCNC: 20 MMOL/L (ref 22–29)
HCT VFR BLD AUTO: 29 % (ref 35–47)
HGB BLD-MCNC: 9.3 G/DL (ref 11.7–15.7)
IMM GRANULOCYTES # BLD: 0 10E3/UL
IMM GRANULOCYTES NFR BLD: 2 %
LYMPHOCYTES # BLD AUTO: 0.9 10E3/UL (ref 0.8–5.3)
LYMPHOCYTES NFR BLD AUTO: 59 %
MCH RBC QN AUTO: 36.5 PG (ref 26.5–33)
MCHC RBC AUTO-ENTMCNC: 32.1 G/DL (ref 31.5–36.5)
MCV RBC AUTO: 114 FL (ref 78–100)
MONOCYTES # BLD AUTO: 0.2 10E3/UL (ref 0–1.3)
MONOCYTES NFR BLD AUTO: 13 %
NEUTROPHILS # BLD AUTO: 0.4 10E3/UL (ref 1.6–8.3)
NEUTROPHILS NFR BLD AUTO: 25 %
NRBC # BLD AUTO: 0.1 10E3/UL
NRBC BLD AUTO-RTO: 4 /100
PLAT MORPH BLD: ABNORMAL
PLATELET # BLD AUTO: 31 10E3/UL (ref 150–450)
POTASSIUM SERPL-SCNC: 3.7 MMOL/L (ref 3.4–5.3)
RBC # BLD AUTO: 2.55 10E6/UL (ref 3.8–5.2)
RBC MORPH BLD: ABNORMAL
SODIUM SERPL-SCNC: 135 MMOL/L (ref 135–145)
WBC # BLD AUTO: 1.6 10E3/UL (ref 4–11)

## 2025-03-28 PROCEDURE — 80048 BASIC METABOLIC PNL TOTAL CA: CPT | Mod: ORL | Performed by: NURSE PRACTITIONER

## 2025-03-28 PROCEDURE — 85025 COMPLETE CBC W/AUTO DIFF WBC: CPT | Mod: ORL | Performed by: INTERNAL MEDICINE

## 2025-03-28 PROCEDURE — P9604 ONE-WAY ALLOW PRORATED TRIP: HCPCS | Mod: ORL | Performed by: INTERNAL MEDICINE

## 2025-03-28 PROCEDURE — 36415 COLL VENOUS BLD VENIPUNCTURE: CPT | Mod: ORL | Performed by: INTERNAL MEDICINE

## 2025-03-31 ENCOUNTER — LAB REQUISITION (OUTPATIENT)
Dept: LAB | Facility: CLINIC | Age: OVER 89
End: 2025-03-31
Payer: MEDICARE

## 2025-03-31 DIAGNOSIS — D46.1 REFRACTORY ANEMIA WITH RING SIDEROBLASTS (H): ICD-10-CM

## 2025-04-01 ENCOUNTER — MEDICAL CORRESPONDENCE (OUTPATIENT)
Dept: HEALTH INFORMATION MANAGEMENT | Facility: CLINIC | Age: OVER 89
End: 2025-04-01
Payer: MEDICARE

## 2025-04-02 LAB
ABO + RH BLD: NORMAL
BLD GP AB SCN SERPL QL: NEGATIVE
SPECIMEN EXP DATE BLD: NORMAL

## 2025-04-03 ENCOUNTER — LAB (OUTPATIENT)
Dept: LAB | Facility: CLINIC | Age: OVER 89
End: 2025-04-03
Payer: MEDICARE

## 2025-04-03 DIAGNOSIS — D46.9 MDS (MYELODYSPLASTIC SYNDROME) (H): ICD-10-CM

## 2025-04-03 PROCEDURE — 86900 BLOOD TYPING SEROLOGIC ABO: CPT

## 2025-04-03 PROCEDURE — 86850 RBC ANTIBODY SCREEN: CPT

## 2025-04-03 PROCEDURE — 36415 COLL VENOUS BLD VENIPUNCTURE: CPT

## 2025-04-04 LAB
BURR CELLS BLD QL SMEAR: SLIGHT
ERYTHROCYTE [DISTWIDTH] IN BLOOD BY AUTOMATED COUNT: 25.2 % (ref 10–15)
HCT VFR BLD AUTO: 21.6 % (ref 35–47)
HGB BLD-MCNC: 7 G/DL (ref 11.7–15.7)
MCH RBC QN AUTO: 37.6 PG (ref 26.5–33)
MCHC RBC AUTO-ENTMCNC: 32.4 G/DL (ref 31.5–36.5)
MCV RBC AUTO: 116 FL (ref 78–100)
PLAT MORPH BLD: ABNORMAL
PLATELET # BLD AUTO: 22 10E3/UL (ref 150–450)
RBC # BLD AUTO: 1.86 10E6/UL (ref 3.8–5.2)
RBC MORPH BLD: ABNORMAL
WBC # BLD AUTO: 1.2 10E3/UL (ref 4–11)

## 2025-04-04 PROCEDURE — P9603 ONE-WAY ALLOW PRORATED MILES: HCPCS | Mod: ORL | Performed by: INTERNAL MEDICINE

## 2025-04-04 PROCEDURE — 85027 COMPLETE CBC AUTOMATED: CPT | Mod: ORL | Performed by: INTERNAL MEDICINE

## 2025-04-04 PROCEDURE — 85007 BL SMEAR W/DIFF WBC COUNT: CPT | Mod: ORL | Performed by: INTERNAL MEDICINE

## 2025-04-04 PROCEDURE — 36415 COLL VENOUS BLD VENIPUNCTURE: CPT | Mod: ORL | Performed by: INTERNAL MEDICINE

## 2025-04-07 ENCOUNTER — LAB REQUISITION (OUTPATIENT)
Dept: LAB | Facility: CLINIC | Age: OVER 89
End: 2025-04-07
Payer: MEDICARE

## 2025-04-07 DIAGNOSIS — D46.1 REFRACTORY ANEMIA WITH RING SIDEROBLASTS (H): ICD-10-CM

## 2025-04-08 LAB
BASOPHILS # BLD MANUAL: 0 10E3/UL (ref 0–0.2)
BASOPHILS NFR BLD MANUAL: 0 %
EOSINOPHIL # BLD MANUAL: 0 10E3/UL (ref 0–0.7)
EOSINOPHIL NFR BLD MANUAL: 1 %
LYMPHOCYTES # BLD MANUAL: 0.6 10E3/UL (ref 0.8–5.3)
LYMPHOCYTES NFR BLD MANUAL: 47 %
MONOCYTES # BLD MANUAL: 0.1 10E3/UL (ref 0–1.3)
MONOCYTES NFR BLD MANUAL: 8 %
MYELOCYTES # BLD MANUAL: 0 10E3/UL
MYELOCYTES NFR BLD MANUAL: 3 %
NEUTROPHILS # BLD MANUAL: 0.4 10E3/UL (ref 1.6–8.3)
NEUTROPHILS NFR BLD MANUAL: 36 %
NRBC # BLD AUTO: 0.1 10E3/UL
NRBC BLD MANUAL-RTO: 7 %
OTHER CELLS # BLD MANUAL: 0.1 10E3/UL
OTHER CELLS NFR BLD MANUAL: 5 %
PATH REV: ABNORMAL

## 2025-04-11 LAB
BASOPHILS # BLD MANUAL: 0 10E3/UL (ref 0–0.2)
BASOPHILS NFR BLD MANUAL: 0 %
EOSINOPHIL # BLD MANUAL: 0 10E3/UL (ref 0–0.7)
EOSINOPHIL NFR BLD MANUAL: 2 %
ERYTHROCYTE [DISTWIDTH] IN BLOOD BY AUTOMATED COUNT: 25.4 % (ref 10–15)
HCT VFR BLD AUTO: 25.7 % (ref 35–47)
HGB BLD-MCNC: 8.3 G/DL (ref 11.7–15.7)
LYMPHOCYTES # BLD MANUAL: 1.1 10E3/UL (ref 0.8–5.3)
LYMPHOCYTES NFR BLD MANUAL: 67 %
MCH RBC QN AUTO: 36.2 PG (ref 26.5–33)
MCHC RBC AUTO-ENTMCNC: 32.3 G/DL (ref 31.5–36.5)
MCV RBC AUTO: 112 FL (ref 78–100)
MONOCYTES # BLD MANUAL: 0.1 10E3/UL (ref 0–1.3)
MONOCYTES NFR BLD MANUAL: 6 %
MYELOCYTES # BLD MANUAL: 0.1 10E3/UL
MYELOCYTES NFR BLD MANUAL: 5 %
NEUTROPHILS # BLD MANUAL: 0.3 10E3/UL (ref 1.6–8.3)
NEUTROPHILS NFR BLD MANUAL: 20 %
PLAT MORPH BLD: NORMAL
PLATELET # BLD AUTO: 19 10E3/UL (ref 150–450)
RBC # BLD AUTO: 2.29 10E6/UL (ref 3.8–5.2)
RBC MORPH BLD: NORMAL
WBC # BLD AUTO: 1.6 10E3/UL (ref 4–11)

## 2025-04-11 PROCEDURE — 36415 COLL VENOUS BLD VENIPUNCTURE: CPT | Mod: ORL | Performed by: INTERNAL MEDICINE

## 2025-04-11 PROCEDURE — P9604 ONE-WAY ALLOW PRORATED TRIP: HCPCS | Mod: ORL | Performed by: INTERNAL MEDICINE

## 2025-04-11 PROCEDURE — 85027 COMPLETE CBC AUTOMATED: CPT | Mod: ORL | Performed by: INTERNAL MEDICINE

## 2025-04-11 PROCEDURE — 85007 BL SMEAR W/DIFF WBC COUNT: CPT | Mod: ORL | Performed by: INTERNAL MEDICINE

## 2025-04-16 ENCOUNTER — LAB REQUISITION (OUTPATIENT)
Dept: LAB | Facility: CLINIC | Age: OVER 89
End: 2025-04-16
Payer: MEDICARE

## 2025-04-16 DIAGNOSIS — D46.1 REFRACTORY ANEMIA WITH RING SIDEROBLASTS (H): ICD-10-CM

## 2025-04-22 ENCOUNTER — TRANSFERRED RECORDS (OUTPATIENT)
Dept: HEALTH INFORMATION MANAGEMENT | Facility: CLINIC | Age: OVER 89
End: 2025-04-22
Payer: MEDICARE

## 2025-04-22 ENCOUNTER — LAB REQUISITION (OUTPATIENT)
Dept: LAB | Facility: CLINIC | Age: OVER 89
End: 2025-04-22
Payer: MEDICARE

## 2025-04-22 DIAGNOSIS — D64.1 SECONDARY SIDEROBLASTIC ANEMIA DUE TO DISEASE (H): ICD-10-CM

## 2025-04-23 ENCOUNTER — HOSPITAL ENCOUNTER (EMERGENCY)
Facility: CLINIC | Age: OVER 89
Discharge: HOME | End: 2025-04-23
Attending: EMERGENCY MEDICINE | Admitting: EMERGENCY MEDICINE
Payer: MEDICARE

## 2025-04-23 VITALS
HEART RATE: 67 BPM | SYSTOLIC BLOOD PRESSURE: 150 MMHG | TEMPERATURE: 97.4 F | OXYGEN SATURATION: 94 % | RESPIRATION RATE: 16 BRPM | DIASTOLIC BLOOD PRESSURE: 73 MMHG

## 2025-04-23 DIAGNOSIS — D46.9 MDS (MYELODYSPLASTIC SYNDROME) (H): ICD-10-CM

## 2025-04-23 DIAGNOSIS — D64.9 SYMPTOMATIC ANEMIA: ICD-10-CM

## 2025-04-23 LAB
ABO + RH BLD: NORMAL
ANION GAP SERPL CALCULATED.3IONS-SCNC: 11 MMOL/L (ref 7–15)
ATRIAL RATE - MUSE: NORMAL BPM
BASOPHILS # BLD MANUAL: 0 10E3/UL (ref 0–0.2)
BASOPHILS NFR BLD MANUAL: 0 %
BLD GP AB SCN SERPL QL: NEGATIVE
BLD PROD TYP BPU: NORMAL
BLD PROD TYP BPU: NORMAL
BLOOD COMPONENT TYPE: NORMAL
BLOOD COMPONENT TYPE: NORMAL
BUN SERPL-MCNC: 48.3 MG/DL (ref 8–23)
CALCIUM SERPL-MCNC: 8.8 MG/DL (ref 8.8–10.4)
CHLORIDE SERPL-SCNC: 100 MMOL/L (ref 98–107)
CODING SYSTEM: NORMAL
CODING SYSTEM: NORMAL
CREAT SERPL-MCNC: 1.22 MG/DL (ref 0.51–0.95)
CROSSMATCH: NORMAL
CROSSMATCH: NORMAL
DIASTOLIC BLOOD PRESSURE - MUSE: NORMAL MMHG
EGFRCR SERPLBLD CKD-EPI 2021: 42 ML/MIN/1.73M2
EOSINOPHIL # BLD MANUAL: 0 10E3/UL (ref 0–0.7)
EOSINOPHIL NFR BLD MANUAL: 0 %
ERYTHROCYTE [DISTWIDTH] IN BLOOD BY AUTOMATED COUNT: 26 % (ref 10–15)
GLUCOSE SERPL-MCNC: 154 MG/DL (ref 70–99)
HCO3 SERPL-SCNC: 20 MMOL/L (ref 22–29)
HCT VFR BLD AUTO: 17.5 % (ref 35–47)
HGB BLD-MCNC: 5.7 G/DL (ref 11.7–15.7)
INTERPRETATION ECG - MUSE: NORMAL
ISSUE DATE AND TIME: NORMAL
ISSUE DATE AND TIME: NORMAL
LYMPHOCYTES # BLD MANUAL: 0.4 10E3/UL (ref 0.8–5.3)
LYMPHOCYTES NFR BLD MANUAL: 28 %
MCH RBC QN AUTO: 37.5 PG (ref 26.5–33)
MCHC RBC AUTO-ENTMCNC: 32.6 G/DL (ref 31.5–36.5)
MCV RBC AUTO: 115 FL (ref 78–100)
METAMYELOCYTES # BLD MANUAL: 0.1 10E3/UL
METAMYELOCYTES NFR BLD MANUAL: 4 %
MONOCYTES # BLD MANUAL: 0.3 10E3/UL (ref 0–1.3)
MONOCYTES NFR BLD MANUAL: 23 %
MYELOCYTES # BLD MANUAL: 0 10E3/UL
MYELOCYTES NFR BLD MANUAL: 2 %
NEUTROPHILS # BLD MANUAL: 0.6 10E3/UL (ref 1.6–8.3)
NEUTROPHILS NFR BLD MANUAL: 43 %
P AXIS - MUSE: NORMAL DEGREES
PLAT MORPH BLD: ABNORMAL
PLATELET # BLD AUTO: 21 10E3/UL (ref 150–450)
POLYCHROMASIA BLD QL SMEAR: SLIGHT
POTASSIUM SERPL-SCNC: 3.6 MMOL/L (ref 3.4–5.3)
PR INTERVAL - MUSE: NORMAL MS
QRS DURATION - MUSE: 96 MS
QT - MUSE: 472 MS
QTC - MUSE: 467 MS
R AXIS - MUSE: -51 DEGREES
RBC # BLD AUTO: 1.52 10E6/UL (ref 3.8–5.2)
RBC MORPH BLD: ABNORMAL
SODIUM SERPL-SCNC: 131 MMOL/L (ref 135–145)
SPECIMEN EXP DATE BLD: NORMAL
SYSTOLIC BLOOD PRESSURE - MUSE: NORMAL MMHG
T AXIS - MUSE: 67 DEGREES
UNIT ABO/RH: NORMAL
UNIT ABO/RH: NORMAL
UNIT NUMBER: NORMAL
UNIT NUMBER: NORMAL
UNIT STATUS: NORMAL
UNIT STATUS: NORMAL
UNIT TYPE ISBT: 6200
UNIT TYPE ISBT: 6200
VENTRICULAR RATE- MUSE: 59 BPM
WBC # BLD AUTO: 1.4 10E3/UL (ref 4–11)

## 2025-04-23 PROCEDURE — 86923 COMPATIBILITY TEST ELECTRIC: CPT | Performed by: EMERGENCY MEDICINE

## 2025-04-23 PROCEDURE — 99291 CRITICAL CARE FIRST HOUR: CPT | Mod: 25

## 2025-04-23 PROCEDURE — 93005 ELECTROCARDIOGRAM TRACING: CPT

## 2025-04-23 PROCEDURE — 250N000011 HC RX IP 250 OP 636: Performed by: EMERGENCY MEDICINE

## 2025-04-23 PROCEDURE — 85018 HEMOGLOBIN: CPT | Performed by: EMERGENCY MEDICINE

## 2025-04-23 PROCEDURE — 86900 BLOOD TYPING SEROLOGIC ABO: CPT | Performed by: EMERGENCY MEDICINE

## 2025-04-23 PROCEDURE — 80048 BASIC METABOLIC PNL TOTAL CA: CPT | Performed by: EMERGENCY MEDICINE

## 2025-04-23 PROCEDURE — 85007 BL SMEAR W/DIFF WBC COUNT: CPT | Performed by: EMERGENCY MEDICINE

## 2025-04-23 PROCEDURE — P9016 RBC LEUKOCYTES REDUCED: HCPCS | Performed by: EMERGENCY MEDICINE

## 2025-04-23 PROCEDURE — 36430 TRANSFUSION BLD/BLD COMPNT: CPT

## 2025-04-23 PROCEDURE — 36415 COLL VENOUS BLD VENIPUNCTURE: CPT | Performed by: EMERGENCY MEDICINE

## 2025-04-23 PROCEDURE — 99292 CRITICAL CARE ADDL 30 MIN: CPT | Mod: 25

## 2025-04-23 RX ORDER — HEPARIN SODIUM (PORCINE) LOCK FLUSH IV SOLN 100 UNIT/ML 100 UNIT/ML
3 SOLUTION INTRAVENOUS ONCE
Status: COMPLETED | OUTPATIENT
Start: 2025-04-23 | End: 2025-04-23

## 2025-04-23 RX ADMIN — HEPARIN 3 ML: 100 SYRINGE at 18:48

## 2025-04-23 ASSESSMENT — ACTIVITIES OF DAILY LIVING (ADL)
ADLS_ACUITY_SCORE: 61

## 2025-04-23 ASSESSMENT — COLUMBIA-SUICIDE SEVERITY RATING SCALE - C-SSRS
6. HAVE YOU EVER DONE ANYTHING, STARTED TO DO ANYTHING, OR PREPARED TO DO ANYTHING TO END YOUR LIFE?: NO
1. IN THE PAST MONTH, HAVE YOU WISHED YOU WERE DEAD OR WISHED YOU COULD GO TO SLEEP AND NOT WAKE UP?: NO
2. HAVE YOU ACTUALLY HAD ANY THOUGHTS OF KILLING YOURSELF IN THE PAST MONTH?: NO

## 2025-04-23 NOTE — ED TRIAGE NOTES
"Pt states that she had blood drawn at Dr. Norton's office yesterday. Received a phone call that hgb is 6 and she should come to ED for transfusion. Pt reports feeling weak and \"washed out.\"         "

## 2025-04-23 NOTE — ED PROVIDER NOTES
Emergency Department Note      History of Present Illness     Chief Complaint   low hemoglobin      HPI   Kayley Putnam is a 89 year old female with history of MDS, secondary sideroblastic anemia, lower GI bleed, AFib, CHF, MVR, CKD IIIb, and breast cancer s/p mastectomy with reconstruction who presents to the ED for evaluation of anemia. Kayley had labs drawn yesterday at Dr. Norton's office and received a call that her Hgb is 6. She was told to come to the ED for a blood transfusion. Patient endorses feeling faint intermittently yesterday and short of breath today. She states this is consistent with her history of anemia. Kayley denies hematochezia, melena, dizziness, fevers, urine symptoms, or pain. Notes she is eating and drinking. She is not anticoagulated.    Independent Historian   None    Review of External Notes       Past Medical History     Medical History and Problem List   Anemia  Atherosclerosis of aorta  Atrial fibrillation  Breast cancer  CAD  CHF  CKD 3b  Hyperlipidemia  Hypertension  Mitral valve regurgitation  Myelodysplastic syndrome  Osteoarthritis  GI bleed with melena  Severe sepsis  Occlusion of carotid artery without cerebral infarction  LVH    Medications   Metoprolol tartrate  Atorvastatin  Furosemide  Letrozole  Spironolactone    Surgical History   Appendectomy  T&A  TSH  BSO  Bilateral knee arthroplasty  Irrigation and debridement right breast  Cataract    Physical Exam     Patient Vitals for the past 24 hrs:   BP Temp Temp src Pulse Resp SpO2   04/23/25 1426 125/49 96.9  F (36.1  C) Temporal 57 14 98 %   04/23/25 1415 118/45 -- -- 52 -- 99 %   04/23/25 1400 118/40 -- -- 52 -- 97 %   04/23/25 1345 131/45 -- -- 52 -- 97 %   04/23/25 1330 138/58 -- -- 55 -- 95 %   04/23/25 1326 138/58 97  F (36.1  C) Temporal 56 14 97 %   04/23/25 1316 (!) 145/53 97.2  F (36.2  C) Temporal 56 14 98 %   04/23/25 1245 125/55 -- -- 58 -- 95 %   04/23/25 1230 136/49 -- -- 74 -- 97 %   04/23/25 1215 129/50 --  -- 54 -- 97 %   04/23/25 1120 -- -- -- -- -- 96 %   04/23/25 1110 118/48 -- -- 52 -- --   04/23/25 1100 108/46 97.4  F (36.3  C) Temporal 62 18 98 %     Physical Exam    HENT:  mmm, no rhinorrhea, sublingual and some conjunctival pallor  Eyes: periorbital tissues and sclera normal   Neck: supple, no abnormal swelling  Lungs:  CTAB,  no resp distress  CV: rrr, 3-6 systolic murmur, ppi  Abd: soft, nontender, nondistended, no rebound/masses/guarding/hsm  Ext: 1+ pitting edema distal bilateral lower extremities  Skin: warm, dry, well perfused, no rashes/bruising/lesions on exposed skin  Neuro: alert, MAEE, moving all extremities without apparent deficit, gait stable  Psych: Normal mood, normal affect      Diagnostics     Lab Results   Labs Ordered and Resulted from Time of ED Arrival to Time of ED Departure   BASIC METABOLIC PANEL - Abnormal       Result Value    Sodium 131 (*)     Potassium 3.6      Chloride 100      Carbon Dioxide (CO2) 20 (*)     Anion Gap 11      Urea Nitrogen 48.3 (*)     Creatinine 1.22 (*)     GFR Estimate 42 (*)     Calcium 8.8      Glucose 154 (*)    CBC WITH PLATELETS AND DIFFERENTIAL - Abnormal    WBC Count 1.4 (*)     RBC Count 1.52 (*)     Hemoglobin 5.7 (*)     Hematocrit 17.5 (*)      (*)     MCH 37.5 (*)     MCHC 32.6      RDW 26.0 (*)     Platelet Count 21 (*)    MANUAL DIFFERENTIAL - Abnormal    % Neutrophils 43      % Lymphocytes 28      % Monocytes 23      % Eosinophils 0      % Basophils 0      % Metamyelocytes 4      % Myelocytes 2      Absolute Neutrophils 0.6 (*)     Absolute Lymphocytes 0.4 (*)     Absolute Monocytes 0.3      Absolute Eosinophils 0.0      Absolute Basophils 0.0      Absolute Metamyelocytes 0.1 (*)     Absolute Myelocytes 0.0     RBC AND PLATELET MORPHOLOGY - Abnormal    RBC Morphology Confirmed RBC Indices      Platelet Assessment        Value: Automated Count Confirmed. Platelet morphology is normal.    Polychromasia Slight (*)    TYPE AND SCREEN, ADULT     ABO/RH(D) A POS      Antibody Screen Negative      SPECIMEN EXPIRATION DATE [id number]     PREPARE RED BLOOD CELLS (UNIT)    Blood Component Type Red Blood Cells      Product Code W8869D57      Unit Status Transfused      Unit Number [id number]      CROSSMATCH Compatible      CODING SYSTEM [id number]      ISSUE DATE AND TIME [id number]      UNIT ABO/RH A+      UNIT TYPE ISBT 6200     PREPARE RED BLOOD CELLS (UNIT)    Blood Component Type Red Blood Cells      Product Code V5144L66      Unit Status Ready for issue      Unit Number [id number]      CROSSMATCH Compatible      CODING SYSTEM [id number]     PREPARE RED BLOOD CELLS (UNIT)   TRANSFUSE RED BLOOD CELLS (UNIT)   TRANSFUSE RED BLOOD CELLS (UNIT)   ABO/RH TYPE AND SCREEN       Imaging   XR Chest 2 Views   Final Result   IMPRESSION:    1. Left chest port with the tip near the superior cavoatrial junction.   2. Stable moderately enlarged cardiopericardial silhouette. Dense aortic calcifications.   3. Decreased size of the left pleural effusion, near completely resolved. No right pleural effusion. Improved and nearly resolved basal predominant increased interstitial markings and patchy airspace opacities suggestive of bronchopneumonia. The residual    opacities are best seen on the lateral radiograph in the retrocardiac region. No pneumothorax.   4. Moderate osteoarthrosis of the right shoulder. Degenerative changes of the spine.   5. Left breast calcifications.          EKG   ECG results from 04/23/25   EKG 12 lead     Value    Systolic Blood Pressure     Diastolic Blood Pressure     Ventricular Rate 59    Atrial Rate     MI Interval     QRS Duration 96        QTc 467    P Axis     R AXIS -51    T Axis 67    Interpretation ECG      Sinus bradycardia with 1st degree AV block  Left axis deviation  Possible Anterior infarct , age undetermined  Abnormal ECG  When compared with ECG of 03-Feb-2025 11:40,  Sinus bradycardia has replaced Atrial  fibrillation  Read by Dr. Michaels at 1128        Independent Interpretation   See ed course     ED Course      Medications Administered   Medications   sodium chloride 0.9% BOLUS 1-250 mL (has no administration in time range)       Procedures   Procedures     Discussion of Management       ED Course   ED Course as of 04/23/25 1446   Wed Apr 23, 2025   1104 I reviewed oncology note from April 8, 2025 that summarizes her oncologic care and ongoing symptomatic recurrent anemia.   1111 I obtained history and examined the patient as noted above.    1213 To my read chest radiograph shows cardiomegaly similar to previous, small left pleural effusion.       Additional Documentation      Medical Decision Making / Diagnosis     CMS Diagnoses:     [facility]   Kayley Putnam is a 89 year old female       History of MDS and acute on chronic anemia with hemoglobin from oncology yesterday at 6.  As essentially symptomatic anemia.  History and physical less suggestive of preceding or concomitant infectious process or occlusive coronary process or pulmonary embolism.  No new acute neurologic deficits.  Plan here will be to transfuse 2 units of blood to get her closer to 8 which would be near baseline.  Also has thrombocytopenia which appears chronic, no significant evidence of ongoing bleeding (denies recurrent melena or hematochezia since hospital discharge).  She would like to avoid hospitalization which is certainly reasonable.  Will transfuse 2 units of blood assuming she is stable at that time plan on discharge home follow-up with PCP or oncology.      Disposition   The patient was discharged.     Diagnosis     ICD-10-CM    1. MDS (myelodysplastic syndrome) (H)  D46.9       2. Symptomatic anemia  D64.9            Discharge Medications   New Prescriptions    No medications on file         Scribe Disclosure:  Summer BUSCH, am serving as a scribe at 11:19 AM on 4/23/2025 to document services personally performed by  Baljeet Michaels MD based on my observations and the provider's statements to me.        Baljeet Michaels MD  04/23/25 5008

## 2025-05-06 ENCOUNTER — LAB REQUISITION (OUTPATIENT)
Dept: LAB | Facility: CLINIC | Age: OVER 89
End: 2025-05-06
Payer: MEDICARE

## 2025-05-06 DIAGNOSIS — D46.1 REFRACTORY ANEMIA WITH RING SIDEROBLASTS (H): ICD-10-CM

## 2025-05-09 LAB
BASOPHILS # BLD MANUAL: 0 10E3/UL (ref 0–0.2)
BASOPHILS NFR BLD MANUAL: 0 %
EOSINOPHIL # BLD MANUAL: 0 10E3/UL (ref 0–0.7)
EOSINOPHIL NFR BLD MANUAL: 1 %
ERYTHROCYTE [DISTWIDTH] IN BLOOD BY AUTOMATED COUNT: 25.1 % (ref 10–15)
HCT VFR BLD AUTO: 24.2 % (ref 35–47)
HGB BLD-MCNC: 7.6 G/DL (ref 11.7–15.7)
LYMPHOCYTES # BLD MANUAL: 0.9 10E3/UL (ref 0.8–5.3)
LYMPHOCYTES NFR BLD MANUAL: 57 %
MCH RBC QN AUTO: 37.6 PG (ref 26.5–33)
MCHC RBC AUTO-ENTMCNC: 31.4 G/DL (ref 31.5–36.5)
MCV RBC AUTO: 120 FL (ref 78–100)
MONOCYTES # BLD MANUAL: 0.1 10E3/UL (ref 0–1.3)
MONOCYTES NFR BLD MANUAL: 6 %
MYELOCYTES # BLD MANUAL: 0.1 10E3/UL
MYELOCYTES NFR BLD MANUAL: 6 %
NEUTROPHILS # BLD MANUAL: 0.5 10E3/UL (ref 1.6–8.3)
NEUTROPHILS NFR BLD MANUAL: 30 %
NRBC # BLD AUTO: 0.1 10E3/UL
NRBC BLD MANUAL-RTO: 5 %
PLAT MORPH BLD: NORMAL
PLATELET # BLD AUTO: 26 10E3/UL (ref 150–450)
RBC # BLD AUTO: 2.02 10E6/UL (ref 3.8–5.2)
RBC MORPH BLD: NORMAL
WBC # BLD AUTO: 1.5 10E3/UL (ref 4–11)

## 2025-05-09 PROCEDURE — P9603 ONE-WAY ALLOW PRORATED MILES: HCPCS | Mod: ORL | Performed by: INTERNAL MEDICINE

## 2025-05-09 PROCEDURE — 85007 BL SMEAR W/DIFF WBC COUNT: CPT | Mod: ORL | Performed by: INTERNAL MEDICINE

## 2025-05-09 PROCEDURE — 85027 COMPLETE CBC AUTOMATED: CPT | Mod: ORL | Performed by: INTERNAL MEDICINE

## 2025-05-09 PROCEDURE — 36415 COLL VENOUS BLD VENIPUNCTURE: CPT | Mod: ORL | Performed by: INTERNAL MEDICINE

## 2025-05-12 LAB
ABO + RH BLD: NORMAL
BLD GP AB SCN SERPL QL: NEGATIVE
SPECIMEN EXP DATE BLD: NORMAL

## 2025-05-13 ENCOUNTER — LAB (OUTPATIENT)
Dept: LAB | Facility: CLINIC | Age: OVER 89
End: 2025-05-13
Payer: MEDICARE

## 2025-05-13 ENCOUNTER — MEDICAL CORRESPONDENCE (OUTPATIENT)
Dept: HEALTH INFORMATION MANAGEMENT | Facility: CLINIC | Age: OVER 89
End: 2025-05-13

## 2025-05-13 ENCOUNTER — LAB REQUISITION (OUTPATIENT)
Dept: LAB | Facility: CLINIC | Age: OVER 89
End: 2025-05-13
Payer: MEDICARE

## 2025-05-13 DIAGNOSIS — D46.1: Primary | ICD-10-CM

## 2025-05-13 DIAGNOSIS — D46.1 REFRACTORY ANEMIA WITH RING SIDEROBLASTS (H): ICD-10-CM

## 2025-05-13 DIAGNOSIS — D64.3 SIDEROBLASTIC ANEMIA (H): ICD-10-CM

## 2025-05-13 DIAGNOSIS — D46.1: ICD-10-CM

## 2025-05-13 PROCEDURE — 36415 COLL VENOUS BLD VENIPUNCTURE: CPT

## 2025-05-13 PROCEDURE — 86901 BLOOD TYPING SEROLOGIC RH(D): CPT

## 2025-05-13 PROCEDURE — 86923 COMPATIBILITY TEST ELECTRIC: CPT | Performed by: INTERNAL MEDICINE

## 2025-05-13 RX ORDER — HEPARIN SODIUM,PORCINE 10 UNIT/ML
5-20 VIAL (ML) INTRAVENOUS DAILY PRN
Status: CANCELLED | OUTPATIENT
Start: 2025-05-13

## 2025-05-13 RX ORDER — HEPARIN SODIUM (PORCINE) LOCK FLUSH IV SOLN 100 UNIT/ML 100 UNIT/ML
5 SOLUTION INTRAVENOUS
Status: CANCELLED | OUTPATIENT
Start: 2025-05-13

## 2025-05-13 RX ORDER — EPINEPHRINE 1 MG/ML
0.3 INJECTION, SOLUTION INTRAMUSCULAR; SUBCUTANEOUS EVERY 5 MIN PRN
Status: CANCELLED | OUTPATIENT
Start: 2025-05-13

## 2025-05-13 RX ORDER — DIPHENHYDRAMINE HYDROCHLORIDE 50 MG/ML
50 INJECTION, SOLUTION INTRAMUSCULAR; INTRAVENOUS
Status: CANCELLED
Start: 2025-05-13

## 2025-05-14 ENCOUNTER — INFUSION THERAPY VISIT (OUTPATIENT)
Dept: INFUSION THERAPY | Facility: CLINIC | Age: OVER 89
End: 2025-05-14
Attending: INTERNAL MEDICINE
Payer: MEDICARE

## 2025-05-14 VITALS
HEART RATE: 58 BPM | TEMPERATURE: 97 F | RESPIRATION RATE: 16 BRPM | DIASTOLIC BLOOD PRESSURE: 62 MMHG | OXYGEN SATURATION: 98 % | SYSTOLIC BLOOD PRESSURE: 120 MMHG

## 2025-05-14 DIAGNOSIS — D64.9 ANEMIA, UNSPECIFIED TYPE: Primary | ICD-10-CM

## 2025-05-14 DIAGNOSIS — D46.9 MDS (MYELODYSPLASTIC SYNDROME) (H): ICD-10-CM

## 2025-05-14 PROCEDURE — 250N000011 HC RX IP 250 OP 636: Performed by: INTERNAL MEDICINE

## 2025-05-14 PROCEDURE — P9016 RBC LEUKOCYTES REDUCED: HCPCS | Performed by: INTERNAL MEDICINE

## 2025-05-14 RX ORDER — HEPARIN SODIUM (PORCINE) LOCK FLUSH IV SOLN 100 UNIT/ML 100 UNIT/ML
5 SOLUTION INTRAVENOUS
Status: DISCONTINUED | OUTPATIENT
Start: 2025-05-14 | End: 2025-05-14

## 2025-05-14 RX ADMIN — Medication 5 ML: at 14:29

## 2025-05-14 NOTE — PROGRESS NOTES
Infusion Nursing Note:  Kayley Putnam presents today for 1 unit PRBCs.    Patient seen by provider today: No   present during visit today: Not Applicable.    Note: Blood run at a max rate of 200ml/hr.      Intravenous Access:  Implanted Port.    Treatment Conditions:  Blood transfusion consent signed 5/13/25.  5/13/25 at outside clinic  Hgb 5.5        Annamaria Cameron RN

## 2025-05-14 NOTE — PROGRESS NOTES
Infusion Nursing Note:  Assumed care from Annamarai ALANIZ RN.     Transfusion max rate of 200mL/hr.     Treatment Conditions:  Results reviewed, labs MET treatment parameters, ok to proceed with treatment.  Hgb 5.5 at [facility]. Consent completed 5/13 with [facility].     Post Infusion Assessment:  Patient tolerated infusion without incident.  Blood return noted pre and post infusion.  Site patent and intact, free from redness, edema or discomfort.  No evidence of extravasations.  Access discontinued per protocol.       Discharge Plan:   Discharge instructions reviewed with: Patient.  Patient and/or family verbalized understanding of discharge instructions and all questions answered.  AVS to patient via [employer].  Patient will return PRN for next appointment.   Patient discharged in stable condition accompanied by: self and son.  Departure Mode: Wheelchair.      Tati Darnell RN

## 2025-05-15 ENCOUNTER — LAB REQUISITION (OUTPATIENT)
Dept: LAB | Facility: CLINIC | Age: OVER 89
End: 2025-05-15
Payer: MEDICARE

## 2025-05-15 DIAGNOSIS — D46.1 REFRACTORY ANEMIA WITH RING SIDEROBLASTS (H): ICD-10-CM

## 2025-05-19 LAB
BURR CELLS BLD QL SMEAR: SLIGHT
ERYTHROCYTE [DISTWIDTH] IN BLOOD BY AUTOMATED COUNT: 26.2 % (ref 10–15)
HCT VFR BLD AUTO: 16.1 % (ref 35–47)
HGB BLD-MCNC: 5 G/DL (ref 11.7–15.7)
MCH RBC QN AUTO: 37.3 PG (ref 26.5–33)
MCHC RBC AUTO-ENTMCNC: 31.1 G/DL (ref 31.5–36.5)
MCV RBC AUTO: 120 FL (ref 78–100)
PLAT MORPH BLD: ABNORMAL
PLATELET # BLD AUTO: 19 10E3/UL (ref 150–450)
RBC # BLD AUTO: 1.34 10E6/UL (ref 3.8–5.2)
RBC MORPH BLD: ABNORMAL
WBC # BLD AUTO: 1.7 10E3/UL (ref 4–11)

## 2025-05-19 PROCEDURE — P9603 ONE-WAY ALLOW PRORATED MILES: HCPCS | Mod: ORL | Performed by: INTERNAL MEDICINE

## 2025-05-19 PROCEDURE — 85007 BL SMEAR W/DIFF WBC COUNT: CPT | Mod: ORL | Performed by: INTERNAL MEDICINE

## 2025-05-19 PROCEDURE — 85027 COMPLETE CBC AUTOMATED: CPT | Mod: ORL | Performed by: INTERNAL MEDICINE

## 2025-05-19 PROCEDURE — 36415 COLL VENOUS BLD VENIPUNCTURE: CPT | Mod: ORL | Performed by: INTERNAL MEDICINE

## 2025-05-20 LAB
BASOPHILS # BLD MANUAL: 0 10E3/UL (ref 0–0.2)
BASOPHILS NFR BLD MANUAL: 0 %
EOSINOPHIL # BLD MANUAL: 0 10E3/UL (ref 0–0.7)
EOSINOPHIL NFR BLD MANUAL: 0 %
LYMPHOCYTES # BLD MANUAL: 0.9 10E3/UL (ref 0.8–5.3)
LYMPHOCYTES NFR BLD MANUAL: 55 %
MONOCYTES # BLD MANUAL: 0.1 10E3/UL (ref 0–1.3)
MONOCYTES NFR BLD MANUAL: 8 %
MYELOCYTES # BLD MANUAL: 0 10E3/UL
MYELOCYTES NFR BLD MANUAL: 1 %
NEUTROPHILS # BLD MANUAL: 0.6 10E3/UL (ref 1.6–8.3)
NEUTROPHILS NFR BLD MANUAL: 35 %
NRBC # BLD AUTO: 0.1 10E3/UL
NRBC BLD MANUAL-RTO: 9 %
OTHER CELLS # BLD MANUAL: 0 10E3/UL
OTHER CELLS NFR BLD MANUAL: 2 %
PATH REV: ABNORMAL

## 2025-06-13 ENCOUNTER — LAB REQUISITION (OUTPATIENT)
Dept: LAB | Facility: CLINIC | Age: OVER 89
End: 2025-06-13
Payer: MEDICARE

## 2025-06-13 DIAGNOSIS — D64.1 SECONDARY SIDEROBLASTIC ANEMIA DUE TO DISEASE (H): ICD-10-CM

## 2025-06-19 ENCOUNTER — LAB REQUISITION (OUTPATIENT)
Dept: LAB | Facility: CLINIC | Age: OVER 89
End: 2025-06-19
Payer: MEDICARE

## 2025-06-19 DIAGNOSIS — D46.1 REFRACTORY ANEMIA WITH RING SIDEROBLASTS (H): ICD-10-CM

## (undated) DEVICE — LINEN HALF SHEET 5512

## (undated) DEVICE — SU VICRYL 4-0 PS-2 18" UND J496H

## (undated) DEVICE — DRAIN JACKSON PRATT RESERVOIR 100ML SU130-1305

## (undated) DEVICE — PEN MARKING SKIN

## (undated) DEVICE — DRSG ADAPTIC 3X8" 6113

## (undated) DEVICE — PREP SKIN SCRUB TRAY 4461A

## (undated) DEVICE — ESU ELEC BLADE 6" COATED/INSULATED E1455-6

## (undated) DEVICE — LINEN FULL SHEET 5511

## (undated) DEVICE — NDL 25GA 1.5" 305127

## (undated) DEVICE — SU SILK 2-0 PSL 18" 673H

## (undated) DEVICE — DRAIN JACKSON PRATT ROUND SIL 19FR W/TROCAR LF JP-2232

## (undated) DEVICE — SU VICRYL 3-0 SH 27" J316H

## (undated) DEVICE — SU PDS II 2-0 SH 27" Z317H

## (undated) DEVICE — GLOVE PROTEXIS W/NEU-THERA 6.5  2D73TE65

## (undated) DEVICE — NDL 18GA 1.5" 305196

## (undated) DEVICE — GLOVE PROTEXIS BLUE W/NEU-THERA 8.0  2D73EB80

## (undated) DEVICE — SU VICRYL 3-0 SH 27" UND J416H

## (undated) DEVICE — SOL WATER BACTERIOSTATIC 30ML VIAL

## (undated) DEVICE — ESU GROUND PAD ADULT W/CORD E7507

## (undated) DEVICE — LINEN TOWEL PACK X10 5473

## (undated) DEVICE — PREP SCRUB SOL EXIDINE 4% CHG 4OZ 29002-404

## (undated) DEVICE — DRAIN JACKSON PRATT 15FR ROUND SIL LF JP-2229

## (undated) DEVICE — SU VICRYL 4-0 P-3 18" UND J494G

## (undated) DEVICE — GLOVE PROTEXIS W/NEU-THERA 7.0  2D73TE70

## (undated) DEVICE — GLOVE PROTEXIS BLUE W/NEU-THERA 6.5  2D73EB65

## (undated) DEVICE — DRSG STERI STRIP 1/2X4" R1547

## (undated) DEVICE — CLIP ETHICON LIGACLIP SM BLUE LT100

## (undated) DEVICE — GLOVE PROTEXIS BLUE W/NEU-THERA 7.5  2D73EB75

## (undated) DEVICE — SU ETHILON 2-0 PS 18" 585H

## (undated) DEVICE — ESU PENCIL W/SMOKE EVAC ROCKER E2350HS

## (undated) DEVICE — DRAPE LAP W/ARMBOARD 29410

## (undated) DEVICE — PACK MINOR CUSTOM RIDGES SBA32RMRMA

## (undated) DEVICE — SYR 10ML FINGER CONTROL W/O NDL 309695

## (undated) DEVICE — PREP CHLORAPREP 26ML TINTED ORANGE  260815

## (undated) DEVICE — GLOVE PROTEXIS W/NEU-THERA 7.5  2D73TE75

## (undated) DEVICE — LINEN DRAPE 54X72" 5467

## (undated) DEVICE — SUCTION CANISTER MEDIVAC LINER 3000ML W/LID 65651-530

## (undated) DEVICE — PAD CHUX UNDERPAD 30X36" P3036C

## (undated) DEVICE — TUBING SUCTION 12"X1/4" N612

## (undated) DEVICE — SU VICRYL 3-0 TIE 12X18" J904T

## (undated) DEVICE — SYR 05ML SLIP TIP W/O NDL 309647

## (undated) DEVICE — BAG CLEAR TRASH 1.3M 39X33" P4040C

## (undated) DEVICE — SOL NACL 0.9% IRRIG 1000ML BOTTLE 2F7124

## (undated) DEVICE — DRSG GAUZE 4X4" 8044

## (undated) DEVICE — DRSG GAUZE 4X4" 3033

## (undated) DEVICE — SLEEVE PROTECTIVE BREAST BIOPSY  GMSLV001-10

## (undated) RX ORDER — ACETAMINOPHEN 500 MG
TABLET ORAL
Status: DISPENSED
Start: 2020-02-22

## (undated) RX ORDER — LABETALOL 20 MG/4 ML (5 MG/ML) INTRAVENOUS SYRINGE
Status: DISPENSED
Start: 2020-02-22

## (undated) RX ORDER — PROPOFOL 10 MG/ML
INJECTION, EMULSION INTRAVENOUS
Status: DISPENSED
Start: 2020-03-04

## (undated) RX ORDER — GLYCOPYRROLATE 0.2 MG/ML
INJECTION INTRAMUSCULAR; INTRAVENOUS
Status: DISPENSED
Start: 2020-02-22

## (undated) RX ORDER — LIDOCAINE HYDROCHLORIDE 10 MG/ML
INJECTION, SOLUTION EPIDURAL; INFILTRATION; INTRACAUDAL; PERINEURAL
Status: DISPENSED
Start: 2020-02-22

## (undated) RX ORDER — ONDANSETRON 2 MG/ML
INJECTION INTRAMUSCULAR; INTRAVENOUS
Status: DISPENSED
Start: 2020-03-04

## (undated) RX ORDER — LIDOCAINE HYDROCHLORIDE 10 MG/ML
INJECTION, SOLUTION EPIDURAL; INFILTRATION; INTRACAUDAL; PERINEURAL
Status: DISPENSED
Start: 2020-03-04

## (undated) RX ORDER — FENTANYL CITRATE 50 UG/ML
INJECTION, SOLUTION INTRAMUSCULAR; INTRAVENOUS
Status: DISPENSED
Start: 2020-03-04

## (undated) RX ORDER — DEXAMETHASONE SODIUM PHOSPHATE 4 MG/ML
INJECTION, SOLUTION INTRA-ARTICULAR; INTRALESIONAL; INTRAMUSCULAR; INTRAVENOUS; SOFT TISSUE
Status: DISPENSED
Start: 2020-02-22

## (undated) RX ORDER — FENTANYL CITRATE 50 UG/ML
INJECTION, SOLUTION INTRAMUSCULAR; INTRAVENOUS
Status: DISPENSED
Start: 2020-02-22

## (undated) RX ORDER — PHENYLEPHRINE HCL IN 0.9% NACL 1 MG/10 ML
SYRINGE (ML) INTRAVENOUS
Status: DISPENSED
Start: 2020-02-22

## (undated) RX ORDER — SCOLOPAMINE TRANSDERMAL SYSTEM 1 MG/1
PATCH, EXTENDED RELEASE TRANSDERMAL
Status: DISPENSED
Start: 2020-03-04

## (undated) RX ORDER — CLINDAMYCIN PHOSPHATE 900 MG/50ML
INJECTION, SOLUTION INTRAVENOUS
Status: DISPENSED
Start: 2020-02-22

## (undated) RX ORDER — CEFAZOLIN SODIUM 2 G/100ML
INJECTION, SOLUTION INTRAVENOUS
Status: DISPENSED
Start: 2020-03-04

## (undated) RX ORDER — ONDANSETRON 2 MG/ML
INJECTION INTRAMUSCULAR; INTRAVENOUS
Status: DISPENSED
Start: 2020-02-22

## (undated) RX ORDER — HYDRALAZINE HYDROCHLORIDE 20 MG/ML
INJECTION INTRAMUSCULAR; INTRAVENOUS
Status: DISPENSED
Start: 2020-03-04

## (undated) RX ORDER — BUPIVACAINE HYDROCHLORIDE 5 MG/ML
INJECTION, SOLUTION EPIDURAL; INTRACAUDAL
Status: DISPENSED
Start: 2020-03-04

## (undated) RX ORDER — EPHEDRINE SULFATE 50 MG/ML
INJECTION, SOLUTION INTRAMUSCULAR; INTRAVENOUS; SUBCUTANEOUS
Status: DISPENSED
Start: 2020-02-22